# Patient Record
Sex: MALE | Race: WHITE | Employment: OTHER | URBAN - METROPOLITAN AREA
[De-identification: names, ages, dates, MRNs, and addresses within clinical notes are randomized per-mention and may not be internally consistent; named-entity substitution may affect disease eponyms.]

---

## 2021-01-07 ENCOUNTER — APPOINTMENT (OUTPATIENT)
Dept: GENERAL RADIOLOGY | Facility: CLINIC | Age: 51
DRG: 287 | End: 2021-01-07
Attending: EMERGENCY MEDICINE
Payer: COMMERCIAL

## 2021-01-07 ENCOUNTER — HOSPITAL ENCOUNTER (INPATIENT)
Facility: CLINIC | Age: 51
LOS: 1 days | Discharge: ANOTHER HOSPITAL | DRG: 287 | End: 2021-01-08
Attending: EMERGENCY MEDICINE | Admitting: INTERNAL MEDICINE
Payer: COMMERCIAL

## 2021-01-07 DIAGNOSIS — I20.0 UNSTABLE ANGINA (H): ICD-10-CM

## 2021-01-07 LAB
ALBUMIN SERPL-MCNC: 4.2 G/DL (ref 3.4–5)
ALP SERPL-CCNC: 69 U/L (ref 40–150)
ALT SERPL W P-5'-P-CCNC: 31 U/L (ref 0–70)
ANION GAP SERPL CALCULATED.3IONS-SCNC: 4 MMOL/L (ref 3–14)
APTT PPP: 35 SEC (ref 22–37)
AST SERPL W P-5'-P-CCNC: 17 U/L (ref 0–45)
BASOPHILS # BLD AUTO: 0 10E9/L (ref 0–0.2)
BASOPHILS NFR BLD AUTO: 0.6 %
BILIRUB SERPL-MCNC: 1 MG/DL (ref 0.2–1.3)
BUN SERPL-MCNC: 14 MG/DL (ref 7–30)
CALCIUM SERPL-MCNC: 9.3 MG/DL (ref 8.5–10.1)
CHLORIDE SERPL-SCNC: 107 MMOL/L (ref 94–109)
CO2 SERPL-SCNC: 27 MMOL/L (ref 20–32)
CREAT SERPL-MCNC: 0.77 MG/DL (ref 0.66–1.25)
DIFFERENTIAL METHOD BLD: NORMAL
EOSINOPHIL # BLD AUTO: 0.1 10E9/L (ref 0–0.7)
EOSINOPHIL NFR BLD AUTO: 2 %
ERYTHROCYTE [DISTWIDTH] IN BLOOD BY AUTOMATED COUNT: 12.2 % (ref 10–15)
GFR SERPL CREATININE-BSD FRML MDRD: >90 ML/MIN/{1.73_M2}
GLUCOSE SERPL-MCNC: 102 MG/DL (ref 70–99)
HBA1C MFR BLD: 5.5 % (ref 0–5.6)
HCT VFR BLD AUTO: 51.4 % (ref 40–53)
HGB BLD-MCNC: 17 G/DL (ref 13.3–17.7)
IMM GRANULOCYTES # BLD: 0 10E9/L (ref 0–0.4)
IMM GRANULOCYTES NFR BLD: 0.3 %
LABORATORY COMMENT REPORT: NORMAL
LYMPHOCYTES # BLD AUTO: 1.7 10E9/L (ref 0.8–5.3)
LYMPHOCYTES NFR BLD AUTO: 25.8 %
MCH RBC QN AUTO: 29.9 PG (ref 26.5–33)
MCHC RBC AUTO-ENTMCNC: 33.1 G/DL (ref 31.5–36.5)
MCV RBC AUTO: 91 FL (ref 78–100)
MONOCYTES # BLD AUTO: 0.6 10E9/L (ref 0–1.3)
MONOCYTES NFR BLD AUTO: 8.9 %
NEUTROPHILS # BLD AUTO: 4.1 10E9/L (ref 1.6–8.3)
NEUTROPHILS NFR BLD AUTO: 62.4 %
NRBC # BLD AUTO: 0 10*3/UL
NRBC BLD AUTO-RTO: 0 /100
PLATELET # BLD AUTO: 252 10E9/L (ref 150–450)
POTASSIUM SERPL-SCNC: 3.6 MMOL/L (ref 3.4–5.3)
PROT SERPL-MCNC: 7.8 G/DL (ref 6.8–8.8)
RBC # BLD AUTO: 5.68 10E12/L (ref 4.4–5.9)
SARS-COV-2 RNA RESP QL NAA+PROBE: NEGATIVE
SODIUM SERPL-SCNC: 138 MMOL/L (ref 133–144)
SPECIMEN SOURCE: NORMAL
TROPONIN I SERPL-MCNC: <0.015 UG/L (ref 0–0.04)
UFH PPP CHRO-ACNC: 0.28 IU/ML
WBC # BLD AUTO: 6.5 10E9/L (ref 4–11)

## 2021-01-07 PROCEDURE — 93005 ELECTROCARDIOGRAM TRACING: CPT

## 2021-01-07 PROCEDURE — 250N000013 HC RX MED GY IP 250 OP 250 PS 637: Performed by: PHYSICIAN ASSISTANT

## 2021-01-07 PROCEDURE — 84484 ASSAY OF TROPONIN QUANT: CPT | Performed by: EMERGENCY MEDICINE

## 2021-01-07 PROCEDURE — 99223 1ST HOSP IP/OBS HIGH 75: CPT | Mod: AI | Performed by: PHYSICIAN ASSISTANT

## 2021-01-07 PROCEDURE — 96375 TX/PRO/DX INJ NEW DRUG ADDON: CPT

## 2021-01-07 PROCEDURE — 71046 X-RAY EXAM CHEST 2 VIEWS: CPT

## 2021-01-07 PROCEDURE — 99285 EMERGENCY DEPT VISIT HI MDM: CPT | Mod: 25

## 2021-01-07 PROCEDURE — 83036 HEMOGLOBIN GLYCOSYLATED A1C: CPT | Performed by: PHYSICIAN ASSISTANT

## 2021-01-07 PROCEDURE — 250N000011 HC RX IP 250 OP 636: Performed by: EMERGENCY MEDICINE

## 2021-01-07 PROCEDURE — 120N000001 HC R&B MED SURG/OB

## 2021-01-07 PROCEDURE — 96365 THER/PROPH/DIAG IV INF INIT: CPT

## 2021-01-07 PROCEDURE — 36415 COLL VENOUS BLD VENIPUNCTURE: CPT | Performed by: PHYSICIAN ASSISTANT

## 2021-01-07 PROCEDURE — 85730 THROMBOPLASTIN TIME PARTIAL: CPT | Performed by: EMERGENCY MEDICINE

## 2021-01-07 PROCEDURE — 250N000013 HC RX MED GY IP 250 OP 250 PS 637: Performed by: EMERGENCY MEDICINE

## 2021-01-07 PROCEDURE — 87635 SARS-COV-2 COVID-19 AMP PRB: CPT | Performed by: EMERGENCY MEDICINE

## 2021-01-07 PROCEDURE — 258N000003 HC RX IP 258 OP 636: Performed by: PHYSICIAN ASSISTANT

## 2021-01-07 PROCEDURE — C9803 HOPD COVID-19 SPEC COLLECT: HCPCS

## 2021-01-07 PROCEDURE — 96376 TX/PRO/DX INJ SAME DRUG ADON: CPT

## 2021-01-07 PROCEDURE — 250N000013 HC RX MED GY IP 250 OP 250 PS 637: Performed by: INTERNAL MEDICINE

## 2021-01-07 PROCEDURE — 85025 COMPLETE CBC W/AUTO DIFF WBC: CPT | Performed by: EMERGENCY MEDICINE

## 2021-01-07 PROCEDURE — 36415 COLL VENOUS BLD VENIPUNCTURE: CPT | Performed by: EMERGENCY MEDICINE

## 2021-01-07 PROCEDURE — 85520 HEPARIN ASSAY: CPT | Performed by: PHYSICIAN ASSISTANT

## 2021-01-07 PROCEDURE — 84484 ASSAY OF TROPONIN QUANT: CPT | Performed by: PHYSICIAN ASSISTANT

## 2021-01-07 PROCEDURE — 80053 COMPREHEN METABOLIC PANEL: CPT | Performed by: EMERGENCY MEDICINE

## 2021-01-07 RX ORDER — LABETALOL HYDROCHLORIDE 5 MG/ML
20 INJECTION, SOLUTION INTRAVENOUS ONCE
Status: COMPLETED | OUTPATIENT
Start: 2021-01-07 | End: 2021-01-07

## 2021-01-07 RX ORDER — ASPIRIN 81 MG/1
81 TABLET, CHEWABLE ORAL DAILY
Status: DISCONTINUED | OUTPATIENT
Start: 2021-01-08 | End: 2021-01-08 | Stop reason: HOSPADM

## 2021-01-07 RX ORDER — ONDANSETRON 2 MG/ML
4 INJECTION INTRAMUSCULAR; INTRAVENOUS EVERY 6 HOURS PRN
Status: DISCONTINUED | OUTPATIENT
Start: 2021-01-07 | End: 2021-01-08 | Stop reason: HOSPADM

## 2021-01-07 RX ORDER — LIDOCAINE 40 MG/G
CREAM TOPICAL
Status: DISCONTINUED | OUTPATIENT
Start: 2021-01-07 | End: 2021-01-08 | Stop reason: HOSPADM

## 2021-01-07 RX ORDER — ROSUVASTATIN CALCIUM 20 MG/1
20 TABLET, COATED ORAL DAILY
Status: DISCONTINUED | OUTPATIENT
Start: 2021-01-07 | End: 2021-01-08 | Stop reason: HOSPADM

## 2021-01-07 RX ORDER — ASPIRIN 325 MG
325 TABLET ORAL ONCE
Status: COMPLETED | OUTPATIENT
Start: 2021-01-07 | End: 2021-01-07

## 2021-01-07 RX ORDER — LISINOPRIL 5 MG/1
5 TABLET ORAL DAILY
Status: DISCONTINUED | OUTPATIENT
Start: 2021-01-07 | End: 2021-01-07

## 2021-01-07 RX ORDER — AMOXICILLIN 250 MG
2 CAPSULE ORAL 2 TIMES DAILY
Status: DISCONTINUED | OUTPATIENT
Start: 2021-01-07 | End: 2021-01-08

## 2021-01-07 RX ORDER — AMOXICILLIN 250 MG
1 CAPSULE ORAL 2 TIMES DAILY
Status: DISCONTINUED | OUTPATIENT
Start: 2021-01-07 | End: 2021-01-08

## 2021-01-07 RX ORDER — METOPROLOL TARTRATE 25 MG/1
25 TABLET, FILM COATED ORAL 2 TIMES DAILY
Status: DISCONTINUED | OUTPATIENT
Start: 2021-01-07 | End: 2021-01-08 | Stop reason: HOSPADM

## 2021-01-07 RX ORDER — NALOXONE HYDROCHLORIDE 0.4 MG/ML
0.2 INJECTION, SOLUTION INTRAMUSCULAR; INTRAVENOUS; SUBCUTANEOUS
Status: DISCONTINUED | OUTPATIENT
Start: 2021-01-07 | End: 2021-01-08 | Stop reason: HOSPADM

## 2021-01-07 RX ORDER — ONDANSETRON 4 MG/1
4 TABLET, ORALLY DISINTEGRATING ORAL EVERY 6 HOURS PRN
Status: DISCONTINUED | OUTPATIENT
Start: 2021-01-07 | End: 2021-01-08 | Stop reason: HOSPADM

## 2021-01-07 RX ORDER — LISINOPRIL 10 MG/1
10 TABLET ORAL DAILY
Status: DISCONTINUED | OUTPATIENT
Start: 2021-01-07 | End: 2021-01-08

## 2021-01-07 RX ORDER — CIPROFLOXACIN AND DEXAMETHASONE 3; 1 MG/ML; MG/ML
4 SUSPENSION/ DROPS AURICULAR (OTIC) 2 TIMES DAILY PRN
COMMUNITY
End: 2022-11-07

## 2021-01-07 RX ORDER — MORPHINE SULFATE 2 MG/ML
2-4 INJECTION, SOLUTION INTRAMUSCULAR; INTRAVENOUS
Status: DISCONTINUED | OUTPATIENT
Start: 2021-01-07 | End: 2021-01-08 | Stop reason: HOSPADM

## 2021-01-07 RX ORDER — ACETAMINOPHEN 325 MG/1
650 TABLET ORAL EVERY 4 HOURS PRN
Status: DISCONTINUED | OUTPATIENT
Start: 2021-01-07 | End: 2021-01-08 | Stop reason: HOSPADM

## 2021-01-07 RX ORDER — IBUPROFEN 200 MG
200-600 TABLET ORAL EVERY 6 HOURS PRN
Status: ON HOLD | COMMUNITY
End: 2021-01-14

## 2021-01-07 RX ORDER — NALOXONE HYDROCHLORIDE 0.4 MG/ML
0.4 INJECTION, SOLUTION INTRAMUSCULAR; INTRAVENOUS; SUBCUTANEOUS
Status: DISCONTINUED | OUTPATIENT
Start: 2021-01-07 | End: 2021-01-08 | Stop reason: HOSPADM

## 2021-01-07 RX ORDER — DOXYCYCLINE HYCLATE 100 MG
100 TABLET ORAL 2 TIMES DAILY PRN
COMMUNITY

## 2021-01-07 RX ORDER — HEPARIN SODIUM 10000 [USP'U]/100ML
0-5000 INJECTION, SOLUTION INTRAVENOUS CONTINUOUS
Status: DISCONTINUED | OUTPATIENT
Start: 2021-01-07 | End: 2021-01-08 | Stop reason: HOSPADM

## 2021-01-07 RX ORDER — SODIUM CHLORIDE 9 MG/ML
INJECTION, SOLUTION INTRAVENOUS CONTINUOUS
Status: DISCONTINUED | OUTPATIENT
Start: 2021-01-07 | End: 2021-01-08

## 2021-01-07 RX ADMIN — METOPROLOL TARTRATE 25 MG: 25 TABLET, FILM COATED ORAL at 22:15

## 2021-01-07 RX ADMIN — LABETALOL HYDROCHLORIDE 20 MG: 5 INJECTION, SOLUTION INTRAVENOUS at 14:14

## 2021-01-07 RX ADMIN — HEPARIN SODIUM 1200 UNITS/HR: 10000 INJECTION, SOLUTION INTRAVENOUS at 14:17

## 2021-01-07 RX ADMIN — SODIUM CHLORIDE: 9 INJECTION, SOLUTION INTRAVENOUS at 19:53

## 2021-01-07 RX ADMIN — ROSUVASTATIN 20 MG: 20 TABLET, FILM COATED ORAL at 17:47

## 2021-01-07 RX ADMIN — ASPIRIN 325 MG ORAL TABLET 325 MG: 325 PILL ORAL at 12:19

## 2021-01-07 RX ADMIN — LISINOPRIL 10 MG: 10 TABLET ORAL at 17:47

## 2021-01-07 RX ADMIN — METOPROLOL TARTRATE 25 MG: 25 TABLET, FILM COATED ORAL at 17:47

## 2021-01-07 ASSESSMENT — ENCOUNTER SYMPTOMS
FATIGUE: 0
DIAPHORESIS: 0
DIZZINESS: 0
SHORTNESS OF BREATH: 1
NAUSEA: 0
CHEST TIGHTNESS: 1

## 2021-01-07 ASSESSMENT — MIFFLIN-ST. JEOR
SCORE: 2087
SCORE: 2067.35
SCORE: 2079.6

## 2021-01-07 ASSESSMENT — ACTIVITIES OF DAILY LIVING (ADL)
ADLS_ACUITY_SCORE: 19
ADLS_ACUITY_SCORE: 14

## 2021-01-07 NOTE — ED NOTES
[facility]  ED Nurse Handoff Report    Mor Monson is a 50 year old male   ED Chief complaint: Chest Pain  . ED Diagnosis:   Final diagnoses:   Unstable angina (H)     Allergies: Not on File    Code Status: Full Code  Activity level - Baseline/Home:  Independent. Activity Level - Current:   Independent. Lift room needed: No. Bariatric: No   Needed: No   Isolation: No. Infection: Not Applicable.     Vital Signs:   Vitals:    01/07/21 1230 01/07/21 1245 01/07/21 1300 01/07/21 1339   BP: (!) 156/104 (!) 153/108 (!) 173/131    Pulse: 82  85    Resp:       Temp:       TempSrc:       SpO2: 94%  97%    Weight:    119.9 kg (264 lb 5.3 oz)   Height:           Cardiac Rhythm:  ,      Pain level:    Patient confused: No. Patient Falls Risk: No.   Elimination Status: Has voided   Patient Report - Initial Complaint: Patient presents with left-sided chest pain upon exertion intermittently since November. SOB with chest pain. Patient states it is getting worse so he came inPain is very intermittent in nature. States chest pain 1/4 mile into a 2 mile walk that fully resolved with a brief period of rest and did not recur during the remainder of the walk. States did have a Stress Test in 2019 which he believes was normal as he never heard anything back about it. Generally active with exercise daily. Focused Assessment: Monitor shows SR with HR 70-80.  BP was elevated 200/XXX.  Labetalol given  Tests Performed:   Labs Ordered and Resulted from Time of ED Arrival Up to the Time of Departure from the ED   COMPREHENSIVE METABOLIC PANEL - Abnormal; Notable for the following components:       Result Value    Glucose 102 (*)     All other components within normal limits   CBC WITH PLATELETS DIFFERENTIAL   TROPONIN I   PARTIAL THROMBOPLASTIN TIME   SARS-COV-2 (COVID-19) VIRUS RT-PCR   PERIPHERAL IV CATHETER   MEASURE WEIGHT   NOTIFY PHYSICIAN   NOTIFY PHYSICIAN   . Abnormal Results:  XR Chest 2 Views    Preliminary Result   IMPRESSION: No acute cardiopulmonary disease.        Treatments provided: labetolol, heparin bolus and infusion  Family Comments: Patient spoke with spouse  OBS brochure/video discussed/provided to patient:  N/A  ED Medications:   Medications   heparin 25,000 units in 0.45% NaCl 250 mL ANTICOAGULANT  infusion (1,200 Units/hr Intravenous New Bag 1/7/21 1417)   aspirin (ASA) tablet 325 mg (325 mg Oral Given 1/7/21 1219)   heparin ANTICOAGULANT loading dose for  LOW INTENSITY TREATMENT* Give BEFORE starting heparin infusion (6,000 Units Intravenous Given 1/7/21 1417)   labetalol (NORMODYNE/TRANDATE) injection 20 mg (20 mg Intravenous Given 1/7/21 1414)     Drips infusing:  Yes  Heparin gtt at 1200u/hour  For the majority of the shift, the patient's behavior Green. Interventions performed were none.    Sepsis treatment initiated: No     Patient tested for COVID 19 prior to admission: YES    ED Nurse Name/Phone Number: Angela Penn RN,   2:26 PM    RECEIVING UNIT ED HANDOFF REVIEW    Above ED Nurse Handoff Report was reviewed: Yes  Reviewed by: Rafa Cantu RN on January 7, 2021 at 3:28 PM

## 2021-01-07 NOTE — CONSULTS
Cardiology Consult Note-- PLEASE SEE ASSOCIATED DICTATION    Mor Monson MRN#: [id number]   YOB: 1970 Age: 50 year old     Date of Admission: 1/7/2021  Consult indication: chest pain         HPI and Assessment and Recommendations/Plan:      Please refer to the associated dictation: #[id number]     - overall clinical presentation is concerning for unstable angina, however clinical history if not classically consistent, symptoms could be from HTN  - Discussed options for further evaluation and management including conservative medical management with close follow up, non-invasive stress testing, or cardiac catheterization/coronary angiography +/- PCI.  Reviewed the risks and benefits of each option at length.  Non-invasive stress testing with close follow up or coronary angiography would be reasonable.   - Pt would like to hold off on making a decision at this time, will consider his options after further discussion with his family   - start lisinopril 10mg daily  - agree with metoprolol tartrate 25mg BID  - agree with rosuvastatin 20mg at bedtime  - agree with ASA  - agree with heparin GTT  - TTE in AM  - NPO at midnight for stress test vs coronary angiogram in AM  - fasting lipids in AM      Thank you for allowing our team to participate in the care of Mor Monson.  Please do not hesitate to page me with any questions or concerns.     Jena Méndez MD, [facility]  Cardiology  Text Page   January 7, 2021         Past Medical History:   I have reviewed this patient's past medical history  The ASCVD Risk score (Nikobriana GARCIA Jr., et al., 2013) failed to calculate for the following reasons:    Cannot find a previous HDL lab    Cannot find a previous total cholesterol lab    The smoking status is invalid  No past medical history on file.            Past Surgical History:   I have reviewed this patient's past surgical history   No past surgical history on file.          Social History:   I have  reviewed this patient's social history  Social History     Tobacco Use     Smoking status: Not on file   Substance Use Topics     Alcohol use: Not on file             Family History:   I have reviewed this patient's family history  No family history on file.          Allergies:   I have reviewed this patient's allergy history  No Known Allergies          Medications reviewed:   Prior to admission medications:  Prior to Admission medications    Medication Sig Start Date End Date Taking? Authorizing Provider   ciprofloxacin-dexamethasone (CIPRODEX) 0.3-0.1 % otic suspension Place 4 drops into both ears 2 times daily as needed    Unknown, Entered By History   doxycycline hyclate (VIBRA-TABS) 100 MG tablet Take 100 mg by mouth 2 times daily as needed (eye infection)    Unknown, Entered By History   ibuprofen (ADVIL/MOTRIN) 200 MG tablet Take 200-600 mg by mouth every 6 hours as needed for mild pain    Unknown, Entered By History      Current medications:  Current Facility-Administered Medications Ordered in Epic   Medication Dose Route Frequency Last Rate Last Admin     acetaminophen (TYLENOL) tablet 650 mg  650 mg Oral Q4H PRN         [START ON 1/8/2021] aspirin (ASA) chewable tablet 81 mg  81 mg Oral Daily         heparin 25,000 units in 0.45% NaCl 250 mL ANTICOAGULANT  infusion  0-5,000 Units/hr Intravenous Continuous 12 mL/hr at 01/07/21 1614 1,200 Units/hr at 01/07/21 1614     HOLD: nitroGLYcerin IF   Does not apply HOLD         lidocaine (LMX4) cream   Topical Q1H PRN         lidocaine 1 % 0.1-1 mL  0.1-1 mL Other Q1H PRN         lisinopril (ZESTRIL) tablet 10 mg  10 mg Oral Daily         medication instruction   Does not apply Continuous PRN         melatonin tablet 1 mg  1 mg Oral At Bedtime PRN         metoprolol tartrate (LOPRESSOR) tablet 25 mg  25 mg Oral BID         morphine (PF) injection 2-4 mg  2-4 mg Intravenous Q2H PRN         naloxone (NARCAN) injection 0.2 mg  0.2 mg Intravenous Q2 Min PRN        Or      naloxone (NARCAN) injection 0.4 mg  0.4 mg Intravenous Q2 Min PRN        Or     naloxone (NARCAN) injection 0.2 mg  0.2 mg Intramuscular Q2 Min PRN        Or     naloxone (NARCAN) injection 0.4 mg  0.4 mg Intramuscular Q2 Min PRN         ondansetron (ZOFRAN-ODT) ODT tab 4 mg  4 mg Oral Q6H PRN        Or     ondansetron (ZOFRAN) injection 4 mg  4 mg Intravenous Q6H PRN         rosuvastatin (CRESTOR) tablet 20 mg  20 mg Oral Daily         senna-docusate (SENOKOT-S/PERICOLACE) 8.6-50 MG per tablet 1 tablet  1 tablet Oral BID        Or     senna-docusate (SENOKOT-S/PERICOLACE) 8.6-50 MG per tablet 2 tablet  2 tablet Oral BID         sodium chloride (PF) 0.9% PF flush 3 mL  3 mL Intracatheter Q8H         sodium chloride (PF) 0.9% PF flush 3 mL  3 mL Intracatheter q1 min prn         sodium chloride 0.9% infusion   Intravenous Continuous         No current [facility]-ordered outpatient medications on file.             Review of Systems:   A complete review of systems was performed and was negative except as mentioned in the HPI.          Physical Exam:   Vital signs were personally reviewed:  Temperatures:  Current - Temp: 97.3  F (36.3  C); Max - Temp  Av.2  F (36.2  C)  Min: 97.1  F (36.2  C)  Max: 97.3  F (36.3  C)  Respiration range: Resp  Avg: 15.8  Min: 13  Max: 18  Pulse range: Pulse  Av.6  Min: 70  Max: 100  Blood pressure range: Systolic (24hrs), Av , Min:147 , Max:219   ; Diastolic (24hrs), Av, Min:84, Max:139    Pulse oximetry range: SpO2  Av.4 %  Min: 94 %  Max: 99 %  No intake or output data in the 24 hours ending 21 1728  262 lbs 11.2 oz  Body mass index is 35.63 kg/m .   Body surface area is 2.46 meters squared.    Constitutional: appears stated age, in no apparent distress, appears to be well nourished  Eyes: sclera anicteric, conjunctiva normal  ENT: normocephalic, without obvious abnormality, atraumatic  Pulmonary: clear to auscultation bilaterally, no wheezes, no rales, no  increased work of breathing  Cardiovascular: JVP normal, regular rate, regular rhythm, normal S1 and S2, no S3, S4, no murmur appreciated, no lower extremity edema  Gastrointestinal: abdominal exam benign  Neurologic: awake, alert, face symmetrical, moves all extremities  Skin: no jaundice  Psychiatric: affect is normal, answers questions appropriately, oriented to self and place         Laboratory tests:   Laboratory tests personally reviewed:   CMP  Recent Labs   Lab 01/07/21  1158      POTASSIUM 3.6   CHLORIDE 107   CO2 27   ANIONGAP 4   *   BUN 14   CR 0.77   GFRESTIMATED >90   GFRESTBLACK >90   JOSE ALBERTO 9.3   PROTTOTAL 7.8   ALBUMIN 4.2   BILITOTAL 1.0   ALKPHOS 69   AST 17   ALT 31     CBC  Recent Labs   Lab 01/07/21  1158   WBC 6.5   RBC 5.68   HGB 17.0   HCT 51.4   MCV 91   MCH 29.9   MCHC 33.1   RDW 12.2        INRNo lab results found in last 7 days.  Lab Results   Component Value Date    TROPI <0.015 01/07/2021    TROPI <0.015 01/07/2021     No results for input(s): CHOL, HDL, LDL, TRIG, CHOLHDLRATIO in the last 24292 hours.  Lab Results   Component Value Date    A1C 5.5 01/07/2021

## 2021-01-07 NOTE — CONSULTS
Consult Date:  01/07/2021      CARDIOLOGY CONSULTATION      CONSULT INDICATION:  Chest pain.      HISTORY OF PRESENT ILLNESS:      I had the opportunity to see patient, Mor Monson today at [facility] for a Cardiology consultation.  As you know, he is a 50-year-old male with a past medical history significant for hypertension who presents for further evaluation and management of chest discomfort.      The patient is quite active at baseline, he works as a  and so is physically active on a regular basis. In November, he was walking about 2 miles every day when he noted left-sided chest discomfort with shortness of breath at the onset of exercise.  His symptoms resolved spontaneously, even with continued exercise.  Subsequently, he has had chest discomfort with increasing frequency, as well as severity.  Over the past several weeks, he has had similar chest tightness while at work, lifting boxes of tile for example.  Interestingly, these symptoms seem to occur sporadically, as there are times when he is able to exert himself physically without any abnormal symptoms.  He denies any symptoms of orthopnea/PND/lower extremity swelling.  He denies any symptoms of presyncope or syncope.      He was seen by his primary care physician for a virtual visit today and due to the concerning nature of his symptoms, he was directed to the Emergency Department for further evaluation and management.      In the Emergency Department, his initial blood pressure was 219/121 mmHg.  The patient states that his blood pressures are typically in the 120s-140s mmHg range systolic, though he does not check it frequently.  Labs are notable for a potassium of 3.6, creatinine 0.77, troponin negative x 2, CBC normal.  COVID-19 negative.  Chest x-ray 01/07/2021 was normal.  An ECG was done, which showed normal sinus rhythm, borderline LVH, normal axis, normal intervals, no acute ischemic changes, QTc 435  milliseconds.      He was admitted to the Internal Medicine Service for further evaluation and management.  He has been initiated on therapy for unstable angina with aspirin, beta blocker therapy, lisinopril, statin, and heparin GTT.  He has not had recurrence of his symptoms since admission.      He does not smoke cigarettes.  He drinks alcohol on the weekends, 1-2 glasses of tequila about a 3-finger pour.  He does not use illicit drugs.  He is  and has 2 daughters.      He denies any family history of early ASCVD.      Last ischemic evaluation was an exercise stress echocardiogram through the [facility] system that was negative for evidence of inducible ischemia.      ASSESSMENT, PLAN AND RECOMMENDATIONS:     1.  Exertional chest discomfort.  Overall, clinical presentation is concerning for unstable angina; however, there are elements in his clinical history that are not classically consistent with angina.  ECG reassuring, troponin negative x2.  His symptoms may very well be from hypertension/hypertensive response to exercise.  2.  Hypertensive urgency, initial blood pressure 219/121 mmHg.   3.  Overweight (weight 262 pounds).      -- Discussed that it would be reasonable to pursue noninvasive stress testing with close followup or coronary angiography plus or minus PCI.  Reviewed the risks and benefits of each option at length.    -- Patient would like to hold off on making a decision at this time, will consider options after further discussion with his family.   -- Continue current cardiac regimen of aspirin, heparin GTT, metoprolol tartrate 25 mg b.i.d., rosuvastatin 20 mg at bedtime.   -- Will start lisinopril 10 mg daily.   -- TTE in a.m.   -- Fasting lipids in a.m.   -- Continue cardiac telemetry.  Maintain potassium greater than 4 and magnesium greater than 2.   -- N.p.o. at midnight pending a stress test versus coronary angiogram in a.m.  -- Cardiology will follow.      Thank you for allowing our  team to participate in the care of patient, Mor Kerns.  Please do not hesitate to page or call with any questions or concerns.      Jean Méndez MD, [facility]  Cardiology  Text Page   2021        D: 2021   T: 2021   MT: MAGEN      Name:     MOR KERNS   MRN:      [id number]        Account:       [id number]   :      1970           Consult Date:  2021      Document: [id number]

## 2021-01-07 NOTE — ED PROVIDER NOTES
History   Chief Complaint:  Chest Tightness       The history is provided by the patient.      Mor Monson is a 50 year old male with history of hypertension who presents with intermittent chest tightness that is prompted by exertion. The patient reports that he regularly goes for a mile walk but since November 2020 has noticed that about 10 minutes/0.25 miles into his walk he has become prematurely short of breath accompanied by chest tightness. He notes that if he stands at rest for a couple of minutes his symptoms resolve completely and he is able to finish the rest of his walk without any recurrence in symptoms. 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 Patient is a  and notes that this is very unusual for him as he typically has no such issues with physical exertion or manual labor. He states that it has been taking less and less exertion to trigger his symptoms and also notes having symptoms during sexual intercourse. He stopped taking his walks around [place] as he became too short of breath. He does note that his symptoms have not lasted longer than 5 minutes and has always resolved with rest. He also emphasizes that he does not feel fatigued along with his shortness of breath. Patient presents today as he met with his PCP via virtual visit who recommended he come in for a stress test. He had a prior stress test completed the August 2018 when he has some chest tightness while playing volleyball, but reports that it was negative. He otherwise feels fine. Patient does have history of hypertension and is not currently on medications but mentions that he takes his BP regularly and his baseline is 140/90. The patient denies any nausea, diaphoresis, or dizziness along with his chest tightness. He further denies any leg swelling. Patient is a non-smoker and has no known family or personal history  of heart trouble or hyperlipidemia. He is not currently taking bloodthinners or aspirin.    STRESS ECHOCARDIOGRAM - valuescope  Date: 08/30/2018 Start: 08:09 AM Facility: [facility]    CONCLUSIONS  REST:  LV chamber size, wall thickness, and segmental and global wall motion  are normal. No significant valvular abnormalities are seen.  The visually estimated resting LVEF is 65 %.    STRESS:  All segments display appropriate hyperkinesis; ejection fraction  increases appropriately.  End systolic area decreases    CONCLUSIONS:  Normal exercise echocardiogram with adequate heart rate and workload.  No evidence for inducible ischemia.  Risk stratification by stress echocardiography is identified as low  risk based on normal stress echocardiogram.      Review of Systems   Constitutional: Negative for diaphoresis and fatigue.   Respiratory: Positive for chest tightness and shortness of breath (with exertion).    Cardiovascular: Negative for leg swelling.   Gastrointestinal: Negative for nausea.   Neurological: Negative for dizziness.   All other systems reviewed and are negative.      Allergies:  Oxycodone-acetaminophen    Medications:  The patient denies taking any current regular medications.    Past Medical History:    Hypertension  Basal cell carcinoma  Chronic mucoid otitis media  Depression  Generalized anxiety    Past Surgical History:    Tonsillectomy  Right tympanostomy tube placement    Family History:    Father - diabetes    Social History:  The patient was not accompanied to the ED.  Smoking Status: Never  Occupation:     Physical Exam     Patient Vitals for the past 24 hrs:   BP Temp Temp src Pulse Resp SpO2 Height Weight   01/07/21 1339 -- -- -- -- -- -- -- 119.9 kg (264 lb 5.3 oz)   01/07/21 1300 (!) 173/131 -- -- 85 -- 97 % -- --   01/07/21 1245 (!) 153/108 -- -- -- -- -- -- --   01/07/21 1230 (!) 156/104 -- -- 82 -- 94 % -- --   01/07/21 1215 (!) 164/119 -- -- 87 -- 96 % -- --    01/07/21 1200 (!) 182/123 -- -- 100 -- 99 % -- --   01/07/21 1135 (!) 219/121 97.1  F (36.2  C) Temporal 92 18 98 % 1.829 m (6') 117.9 kg (260 lb)       Physical Exam  General: Well appearing, nontoxic. Resting comfortably  Head:  Scalp, face, and head appear normal  Eyes:  Pupils are equal, round    Conjunctivae non-injected and sclerae white  ENT:    The external nose is normal    Pinnae are normal  Neck:  Normal range of motion    There is no rigidity noted    Trachea is in the midline  CV:  Regular rate and rhythm     Normal S1/S2, no S3/S4    No murmur or rub. Radial pulses 2+ bilaterally.  Resp:  Lungs are clear and equal bilaterally  There is no tachypnea    No increased work of breathing    No rales, wheezing, or rhonchi  GI:  Abdomen is soft, no rigidity or guarding    No distension, or mass    No tenderness or rebound tenderness   MS:  Normal muscular tone    Symmetric motor strength    No lower extremity edema. No calf swelling or tenderness.  Skin:  No rash or acute skin lesions noted  Neuro: Awake and alert  Speech is normal and fluent  Moves all extremities spontaneously  Psych:  Normal affect. Appropriate interactions.    Emergency Department Course     ECG:  Indication: Chest tightness  Completed at 1130.  Read at 1200.   Normal sinus rhythm   Rate 87 bpm. WY interval 154. QRS duration 94. QT/QTc 362/435. P-R-T axes 42 55 43.  Agree with computer interpretation.     Imaging:  XR Chest 2 Views:  No acute cardiopulmonary disease.  Report per radiology.    Laboratory:  CBC: WBC 6.5, HGB 17.0,   CMP: Glucose 102 (H), o/w WNL (Creatinine 0.77)    PTT: 35    Heparin Unfractionated Anti Xa Level: Pending    (1158) Troponin I ES: <0.015     Asymptomatic COVID-19 Virus (Coronavirus) by Nasopharyngeal (NP) Swab: Pending    Procedures  None.    Emergency Department Course:    Reviewed:  I reviewed nursing notes, vitals, past medical history and care everywhere.    Assessments:  1217 I initially  evaluated the patient in ED room 18.   1315 I reassessed the patient and updated him on my conversation with Cardiology and plan for admission.    Consults:   1302 I consulted with Dr. Méndez of the Cardiology service regarding patient. He recommends that the patient be admitted.  134 I consulted with Adeline Delgado PA-C for Dr. Mesa of the Hospitalist service regarding patient, who agrees to admit patient to hospital in monitored medical bed.     Interventions:  1219 Aspirin 325 mg PO  1414 Labetalol 20 mg IV  1417 Heparin 6,000 units IV  1417 Heparin 25,000 units IV Bolus    Disposition:  The patient was admitted to the hospital under the care of Dr. Mesa.     Impression & Plan     Covid-19  Mor Monson was evaluated during a global COVID-19 pandemic, which necessitated consideration that the patient might be at risk for infection with the SARS-CoV-2 virus that causes COVID-19.   Applicable protocols for evaluation were followed during the patient's care.   COVID-19 was considered as part of the patient's evaluation. A test was obtained during this visit prior to the patient's admission.    Medical Decision Making:  Mor Monson is a 50 year old male without any past known cardiac history who presents with accelerating exertional chest pain and shortness of breath consistent with angina.  On my evaluation he is well-appearing, hemodynamically stable and afebrile.  He is currently chest pain-free.  Initial EKG and troponin are normal.  However his symptoms are concerning for cardiac related chest pain with likely underlying coronary artery disease.  He does have risk factors including obesity and hypertension and is significantly hypertensive on arrival to the emergency department.  Chest x-ray and the remainder of his initial ED work-up was otherwise unremarkable.  Given his concerning symptoms I discussed the case with Dr. Méndez of cardiology who recommended admission to the hospital for further cardiac  testing and treatment of unstable angina.  The patient was started on heparin infusion.  He was given 1 dose of labetalol IV with improvement of his blood pressure and the case was discussed with the hospitalist to excepted the patient for admission.  The patient was agreeable to plan of care.  The patient was admitted in stable condition.    Diagnosis:    ICD-10-CM    1. Unstable angina (H)  I20.0 Partial thromboplastin time     Asymptomatic SARS-CoV-2 COVID-19 Virus (Coronavirus) by PCR       Scribe Disclosure:  Aundrea DIAZ, am serving as a scribe at 12:00 PM on 1/7/2021 to document services personally performed by Boone Morris MD based on my observations and the provider's statements to me.     Aundrea Her  1/7/2021   [facility] EMERGENCY DEPARTMENT           Boone Morris MD  01/08/21 1112

## 2021-01-07 NOTE — ED TRIAGE NOTES
Patient presents with left-sided chest pain upon exertion intermittently since November. SOB with chest pain. Patient states it is getting worse so he came in.

## 2021-01-07 NOTE — ED NOTES
Pain is very intermittent in nature.  chest pain 1/4 mile into a 2 mile walk that fully resolved with a brief period of rest and did not recur during the remainder of the walk.  did have a Stress Test in 2019 which he believes was normal as he never heard anything back about it. Generally active with exercise daily

## 2021-01-07 NOTE — PHARMACY-ADMISSION MEDICATION HISTORY
Admission medication history interview status for this patient is complete. See [facility] admission navigator for allergy information, prior to admission medications and immunization status.     Medication history interview done via telephone during Covid-19 pandemic, indicate source(s): Patient's wife Jessica  Medication history resources (including written lists, pill bottles, clinic record):None  Pharmacy: [facility]    Changes made to PTA medication list:  Added: all meds  Deleted: none  Changed: none    Actions taken by pharmacist (provider contacted, etc):None     Additional medication history information:    Patient does have atorvastatin 20 mg daily and amlodipine 5 mg daily prescriptions at home, but per wife he has not taken these in a very long time. Per wife, pt reports sexual side effects associated with one/both medications as the reason for non-compliance.     Medication reconciliation/reorder completed by provider prior to medication history?  N   (Y/N)     Prior to Admission medications    Medication Sig Last Dose Taking? Auth Provider   ciprofloxacin-dexamethasone (CIPRODEX) 0.3-0.1 % otic suspension Place 4 drops into both ears 2 times daily as needed More than a month at Unknown time  Unknown, Entered By History   doxycycline hyclate (VIBRA-TABS) 100 MG tablet Take 100 mg by mouth 2 times daily as needed (eye infection) More than a month at Unknown time  Unknown, Entered By History   ibuprofen (ADVIL/MOTRIN) 200 MG tablet Take 200-600 mg by mouth every 6 hours as needed for mild pain More than a month at Unknown time  Unknown, Entered By History

## 2021-01-07 NOTE — H&P
History and Physical     Mor Monson MRN# [id number]   YOB: 1970 Age: 50 year old      Date of Admission:  1/7/2021    Primary care provider: Asad Goodwin          Assessment and Plan:   Mor Monson is a 50 year old male with a PMH significant for HTN (not on BP meds, due to noncompliance), who presents with 6-8 weeks of progressive RAY, dec exercise tolerance and chest tightness with exertion. It has been escalating since XMAS and therefore stopped his daily walks. He was seen by PCP virtually to get outpt Stress testing but given the escalating nature, concerning for unstable angina, he was directed to come to the ED for further evaluation.     Work up in the ED reveals negative troponin and non ischemic EKG.   Cardiology was contacted due to classic history for unstable angina, patient was started on heparin drip and recommended for admission overnight with cardiology evaluation and likely coronary angiogram tomorrow.    1.  Progressive dyspnea on exertion, chest discomfort concerning for unstable angina  --Patient has fairly classic story for unstable angina  --Initial troponin negative, will check 1 more troponin  --Has no family or personal history of coronary disease.,  Has had past stress echo that was negative in 2018  --Cardiac risk factors includes hypertension, (unknown lipid and diabetes profile) but no family history  --Request formal cardiology consult  --Continue heparin drip, baby aspirin  --Check lipid and diabetes profile  --Start blood pressure medications for hypertension (see below)  --N.p.o. after midnight for likely coronary angiogram    2.  Hypertension-blood pressure elevated  --We will start metoprolol 25 twice daily with parameters, as well as very low-dose lisinopril 5 mg  --Titrate medication as BP allows    3.  History of depression anxiety--patient states that he is not on any medication    COVID-19 asymptomatic -- NEGATIVE    Admit to inpatient status    CODE: FULL  Dispo: Home in 2-3 days pending progress\  Updated wife, Jessica         Chief Complaint:   Chest pain        History of Present Illness:   Mor Monson is a 50 year old male with a PMH significant for HTN (not on BP meds, due to noncompliance), who presents with 6-8 weeks of progressive RAY, dec exercise tolerance and chest tightness with exertion.  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  He does walk every day but around Erika time he started getting substernal chest discomfort and shortness of breath that required him to stop and rest for about 2 minutes.  He is able to push through and walk for additional 40 minutes however since Erika his chest discomfort has escalated so he stopped walking.  Yesterday he was carrying a box of tiles and walking up 3 flights of stairs and he became quite winded and had substernal chest tightness that he had to stop.  It did improve after he rested for about 5 minutes.  He noticed that these chest discomfort and shortness of breath with exertion has become more frequent compared to previous and therefore he schedule a virtual visit with his PCP today.  After discussion with his PCP it was recommended that he come to the emergency room for further evaluation.    Work up in the ED reveals negative troponin and non ischemic EKG.   Cardiology was contacted due to classic history for unstable angina, patient was started on heparin drip and recommended for admission overnight with cardiology evaluation and likely coronary angiogram tomorrow.         Past Medical History:   HTN--not taking meds due to concerns of sexual SE  HLP denies but wife states he was prescribed Statin in the past          Past Surgical History:     Reviewed and non contributory.           Social History:     Social History     Socioeconomic History     Marital status:      Spouse name: Not on file     Number of children: Not on file     Years of  education: Not on file     Highest education level: Not on file   Occupational History     Not on file   Social Needs     Financial resource strain: Not on file     Food insecurity     Worry: Not on file     Inability: Not on file     Transportation needs     Medical: Not on file     Non-medical: Not on file   Tobacco Use     Smoking status: Not on file   Substance and Sexual Activity     Alcohol use: Not on file     Drug use: Not on file     Sexual activity: Not on file   Lifestyle     Physical activity     Days per week: Not on file     Minutes per session: Not on file     Stress: Not on file   Relationships     Social connections     Talks on phone: Not on file     Gets together: Not on file     Attends [employer] service: Not on file     Active member of club or organization: Not on file     Attends meetings of clubs or organizations: Not on file     Relationship status: Not on file     Intimate partner violence     Fear of current or ex partner: Not on file     Emotionally abused: Not on file     Physically abused: Not on file     Forced sexual activity: Not on file   Other Topics Concern     Not on file   Social History Narrative     Not on file               Family History:     No family hx of CAD.   Father with DM   Mother none          Allergies:    Not on File            Medications:     Prior to Admission medications    Medication Sig Last Dose Taking? Auth Provider   ciprofloxacin-dexamethasone (CIPRODEX) 0.3-0.1 % otic suspension Place 4 drops into both ears 2 times daily as needed More than a month at Unknown time  Unknown, Entered By History   doxycycline hyclate (VIBRA-TABS) 100 MG tablet Take 100 mg by mouth 2 times daily as needed (eye infection) More than a month at Unknown time  Unknown, Entered By History   ibuprofen (ADVIL/MOTRIN) 200 MG tablet Take 200-600 mg by mouth every 6 hours as needed for mild pain More than a month at Unknown time  Unknown, Entered By History              Review of  Systems:     A Comprehensive greater than 10 system review of systems was carried out.  Pertinent positives and negatives are noted above.  Otherwise negative for contributory information.            Physical Exam:   Blood pressure (!) 148/101, pulse 78, temperature 97.1  F (36.2  C), temperature source Temporal, resp. rate 13, height 1.829 m (6'), weight 119.9 kg (264 lb 5.3 oz), SpO2 95 %.  Exam:  GENERAL:  Comfortable. OBese  PSYCH: pleasant, oriented, No acute distress.  HEENT:  PERRLA. Normal conjunctiva, normal hearing, nasal mucosa and Oropharynx are normal.  NECK:  Supple, no neck vein distention, adenopathy or bruits, normal thyroid.  HEART:  Normal S1, S2 with no murmur, no pericardial rub, gallops or S3 or S4.  LUNGS:  Clear to auscultation, normal Respiratory effort. No wheezing, rales or ronchi.  ABDOMEN:  Soft, no hepatosplenomegaly, normal bowel sounds. Non-tender, non distended.   EXTREMITIES:  No pedal edema, +2 pulses bilateral and equal.  SKIN:  Dry to touch, No rash, wound or ulcerations.  NEUROLOGIC:  CN 2-12 intact, BL 5/5 symmetric upper and lower extremity strength, sensation is intact with no focal deficits.           Data:     Recent Labs   Lab 01/07/21  1158   WBC 6.5   HGB 17.0   HCT 51.4   MCV 91        Recent Labs   Lab 01/07/21  1158      POTASSIUM 3.6   CHLORIDE 107   CO2 27   ANIONGAP 4   *   BUN 14   CR 0.77   GFRESTIMATED >90   GFRESTBLACK >90   JOSE ALBERTO 9.3     No results for input(s): TSH in the last 168 hours.  Recent Labs   Lab 01/07/21  1158   TROPI <0.015         Results for orders placed or performed during the hospital encounter of 01/07/21   XR Chest 2 Views    Narrative    CHEST TWO VIEWS   1/7/2021 12:39 PM     HISTORY: Exertional chest pain.    COMPARISON: None.      Impression    IMPRESSION: No acute cardiopulmonary disease.         Adeline Delgado PA-C

## 2021-01-08 ENCOUNTER — ANESTHESIA EVENT (OUTPATIENT)
Dept: SURGERY | Facility: CLINIC | Age: 51
DRG: 235 | End: 2021-01-08
Payer: COMMERCIAL

## 2021-01-08 ENCOUNTER — APPOINTMENT (OUTPATIENT)
Dept: ULTRASOUND IMAGING | Facility: CLINIC | Age: 51
DRG: 235 | End: 2021-01-08
Attending: PHYSICIAN ASSISTANT
Payer: COMMERCIAL

## 2021-01-08 ENCOUNTER — APPOINTMENT (OUTPATIENT)
Dept: GENERAL RADIOLOGY | Facility: CLINIC | Age: 51
DRG: 235 | End: 2021-01-08
Attending: PHYSICIAN ASSISTANT
Payer: COMMERCIAL

## 2021-01-08 ENCOUNTER — HOSPITAL ENCOUNTER (INPATIENT)
Facility: CLINIC | Age: 51
LOS: 6 days | Discharge: HOME | DRG: 235 | End: 2021-01-14
Attending: HOSPITALIST | Admitting: INTERNAL MEDICINE
Payer: COMMERCIAL

## 2021-01-08 ENCOUNTER — APPOINTMENT (OUTPATIENT)
Dept: CARDIOLOGY | Facility: CLINIC | Age: 51
DRG: 287 | End: 2021-01-08
Attending: INTERNAL MEDICINE
Payer: COMMERCIAL

## 2021-01-08 VITALS
WEIGHT: 262.7 LBS | DIASTOLIC BLOOD PRESSURE: 71 MMHG | SYSTOLIC BLOOD PRESSURE: 124 MMHG | OXYGEN SATURATION: 98 % | TEMPERATURE: 98.1 F | RESPIRATION RATE: 16 BRPM | HEART RATE: 68 BPM | BODY MASS INDEX: 35.58 KG/M2 | HEIGHT: 72 IN

## 2021-01-08 DIAGNOSIS — I25.110 CORONARY ARTERY DISEASE INVOLVING NATIVE CORONARY ARTERY OF NATIVE HEART WITH UNSTABLE ANGINA PECTORIS (H): ICD-10-CM

## 2021-01-08 DIAGNOSIS — Z95.1 S/P CABG (CORONARY ARTERY BYPASS GRAFT): Primary | ICD-10-CM

## 2021-01-08 DIAGNOSIS — I20.0 UNSTABLE ANGINA (H): ICD-10-CM

## 2021-01-08 PROBLEM — I25.10 CORONARY ARTERY DISEASE: Status: ACTIVE | Noted: 2021-01-08

## 2021-01-08 LAB
ALBUMIN SERPL-MCNC: 3.9 G/DL (ref 3.4–5)
ALBUMIN UR-MCNC: NEGATIVE MG/DL
ALP SERPL-CCNC: 62 U/L (ref 40–150)
ALT SERPL W P-5'-P-CCNC: 26 U/L (ref 0–70)
ANION GAP SERPL CALCULATED.3IONS-SCNC: 5 MMOL/L (ref 3–14)
APPEARANCE UR: CLEAR
AST SERPL W P-5'-P-CCNC: 23 U/L (ref 0–45)
BACTERIA #/AREA URNS HPF: ABNORMAL /HPF
BILIRUB DIRECT SERPL-MCNC: 0.1 MG/DL (ref 0–0.2)
BILIRUB SERPL-MCNC: 1.2 MG/DL (ref 0.2–1.3)
BILIRUB UR QL STRIP: NEGATIVE
BUN SERPL-MCNC: 9 MG/DL (ref 7–30)
CALCIUM SERPL-MCNC: 9 MG/DL (ref 8.5–10.1)
CHLORIDE SERPL-SCNC: 108 MMOL/L (ref 94–109)
CHOLEST SERPL-MCNC: 226 MG/DL
CO2 SERPL-SCNC: 24 MMOL/L (ref 20–32)
COLOR UR AUTO: YELLOW
CREAT SERPL-MCNC: 0.64 MG/DL (ref 0.66–1.25)
ERYTHROCYTE [DISTWIDTH] IN BLOOD BY AUTOMATED COUNT: 12.1 % (ref 10–15)
GFR SERPL CREATININE-BSD FRML MDRD: >90 ML/MIN/{1.73_M2}
GLUCOSE SERPL-MCNC: 113 MG/DL (ref 70–99)
GLUCOSE UR STRIP-MCNC: NEGATIVE MG/DL
HCT VFR BLD AUTO: 49.7 % (ref 40–53)
HDLC SERPL-MCNC: 45 MG/DL
HGB BLD-MCNC: 17.2 G/DL (ref 13.3–17.7)
HGB UR QL STRIP: NEGATIVE
INTERPRETATION ECG - MUSE: NORMAL
KCT BLD-ACNC: 164 SEC (ref 75–150)
KETONES UR STRIP-MCNC: 10 MG/DL
LDLC SERPL CALC-MCNC: 159 MG/DL
LEUKOCYTE ESTERASE UR QL STRIP: NEGATIVE
MCH RBC QN AUTO: 30.4 PG (ref 26.5–33)
MCHC RBC AUTO-ENTMCNC: 34.6 G/DL (ref 31.5–36.5)
MCV RBC AUTO: 88 FL (ref 78–100)
MRSA DNA SPEC QL NAA+PROBE: NEGATIVE
MUCOUS THREADS #/AREA URNS LPF: PRESENT /LPF
NITRATE UR QL: NEGATIVE
NONHDLC SERPL-MCNC: 181 MG/DL
PH UR STRIP: 6.5 PH (ref 5–7)
PLATELET # BLD AUTO: 264 10E9/L (ref 150–450)
POTASSIUM SERPL-SCNC: 4 MMOL/L (ref 3.4–5.3)
PROT SERPL-MCNC: 7.2 G/DL (ref 6.8–8.8)
RBC # BLD AUTO: 5.65 10E12/L (ref 4.4–5.9)
RBC #/AREA URNS AUTO: 0 /HPF (ref 0–2)
SODIUM SERPL-SCNC: 137 MMOL/L (ref 133–144)
SOURCE: ABNORMAL
SP GR UR STRIP: 1.01 (ref 1–1.03)
SPECIMEN SOURCE: NORMAL
TRIGL SERPL-MCNC: 108 MG/DL
UFH PPP CHRO-ACNC: 0.1 IU/ML
UFH PPP CHRO-ACNC: 0.21 IU/ML
UROBILINOGEN UR STRIP-MCNC: 0 MG/DL (ref 0–2)
WBC # BLD AUTO: 7.3 10E9/L (ref 4–11)
WBC #/AREA URNS AUTO: 0 /HPF (ref 0–5)

## 2021-01-08 PROCEDURE — 210N000002 HC R&B HEART CARE

## 2021-01-08 PROCEDURE — 81001 URINALYSIS AUTO W/SCOPE: CPT | Performed by: PHYSICIAN ASSISTANT

## 2021-01-08 PROCEDURE — 250N000013 HC RX MED GY IP 250 OP 250 PS 637: Performed by: PHYSICIAN ASSISTANT

## 2021-01-08 PROCEDURE — 99152 MOD SED SAME PHYS/QHP 5/>YRS: CPT | Mod: 59 | Performed by: INTERNAL MEDICINE

## 2021-01-08 PROCEDURE — 36415 COLL VENOUS BLD VENIPUNCTURE: CPT | Performed by: INTERNAL MEDICINE

## 2021-01-08 PROCEDURE — 93880 EXTRACRANIAL BILAT STUDY: CPT

## 2021-01-08 PROCEDURE — 250N000011 HC RX IP 250 OP 636: Performed by: INTERNAL MEDICINE

## 2021-01-08 PROCEDURE — 999N000208 ECHOCARDIOGRAM COMPLETE

## 2021-01-08 PROCEDURE — 250N000011 HC RX IP 250 OP 636: Performed by: PHYSICIAN ASSISTANT

## 2021-01-08 PROCEDURE — 93970 EXTREMITY STUDY: CPT

## 2021-01-08 PROCEDURE — 93458 L HRT ARTERY/VENTRICLE ANGIO: CPT | Performed by: INTERNAL MEDICINE

## 2021-01-08 PROCEDURE — 258N000003 HC RX IP 258 OP 636: Performed by: PHYSICIAN ASSISTANT

## 2021-01-08 PROCEDURE — 99233 SBSQ HOSP IP/OBS HIGH 50: CPT | Mod: 57 | Performed by: SURGERY

## 2021-01-08 PROCEDURE — 255N000002 HC RX 255 OP 636: Performed by: INTERNAL MEDICINE

## 2021-01-08 PROCEDURE — 99207 PR APP CREDIT; MD BILLING SHARED VISIT: CPT | Performed by: INTERNAL MEDICINE

## 2021-01-08 PROCEDURE — 85347 COAGULATION TIME ACTIVATED: CPT

## 2021-01-08 PROCEDURE — 4A023N7 MEASUREMENT OF CARDIAC SAMPLING AND PRESSURE, LEFT HEART, PERCUTANEOUS APPROACH: ICD-10-PCS | Performed by: INTERNAL MEDICINE

## 2021-01-08 PROCEDURE — 272N000001 HC OR GENERAL SUPPLY STERILE: Performed by: INTERNAL MEDICINE

## 2021-01-08 PROCEDURE — 93458 L HRT ARTERY/VENTRICLE ANGIO: CPT | Mod: 26 | Performed by: INTERNAL MEDICINE

## 2021-01-08 PROCEDURE — 99232 SBSQ HOSP IP/OBS MODERATE 35: CPT | Performed by: INTERNAL MEDICINE

## 2021-01-08 PROCEDURE — B2111ZZ FLUOROSCOPY OF MULTIPLE CORONARY ARTERIES USING LOW OSMOLAR CONTRAST: ICD-10-PCS | Performed by: INTERNAL MEDICINE

## 2021-01-08 PROCEDURE — 2894A VOIDCORRECT: CPT | Performed by: PHYSICIAN ASSISTANT

## 2021-01-08 PROCEDURE — 71046 X-RAY EXAM CHEST 2 VIEWS: CPT

## 2021-01-08 PROCEDURE — 80048 BASIC METABOLIC PNL TOTAL CA: CPT | Performed by: PHYSICIAN ASSISTANT

## 2021-01-08 PROCEDURE — 93931 UPPER EXTREMITY STUDY: CPT | Mod: LT

## 2021-01-08 PROCEDURE — 87641 MR-STAPH DNA AMP PROBE: CPT | Performed by: SURGERY

## 2021-01-08 PROCEDURE — 87640 STAPH A DNA AMP PROBE: CPT | Performed by: SURGERY

## 2021-01-08 PROCEDURE — 99233 SBSQ HOSP IP/OBS HIGH 50: CPT | Performed by: PHYSICIAN ASSISTANT

## 2021-01-08 PROCEDURE — 85520 HEPARIN ASSAY: CPT | Performed by: INTERNAL MEDICINE

## 2021-01-08 PROCEDURE — 250N000009 HC RX 250: Performed by: INTERNAL MEDICINE

## 2021-01-08 PROCEDURE — 250N000013 HC RX MED GY IP 250 OP 250 PS 637: Performed by: INTERNAL MEDICINE

## 2021-01-08 PROCEDURE — 999N000147 HC STATISTIC PT IP EVAL DEFER

## 2021-01-08 PROCEDURE — 99152 MOD SED SAME PHYS/QHP 5/>YRS: CPT | Performed by: INTERNAL MEDICINE

## 2021-01-08 PROCEDURE — 85027 COMPLETE CBC AUTOMATED: CPT | Performed by: PHYSICIAN ASSISTANT

## 2021-01-08 PROCEDURE — 93306 TTE W/DOPPLER COMPLETE: CPT | Mod: 26 | Performed by: INTERNAL MEDICINE

## 2021-01-08 PROCEDURE — 99153 MOD SED SAME PHYS/QHP EA: CPT | Performed by: INTERNAL MEDICINE

## 2021-01-08 PROCEDURE — 80076 HEPATIC FUNCTION PANEL: CPT | Performed by: PHYSICIAN ASSISTANT

## 2021-01-08 PROCEDURE — 80061 LIPID PANEL: CPT | Performed by: PHYSICIAN ASSISTANT

## 2021-01-08 RX ORDER — LIDOCAINE 40 MG/G
CREAM TOPICAL
Status: CANCELLED | OUTPATIENT
Start: 2021-01-08

## 2021-01-08 RX ORDER — ASPIRIN 81 MG/1
162 TABLET, CHEWABLE ORAL
Status: CANCELLED | OUTPATIENT
Start: 2021-01-08

## 2021-01-08 RX ORDER — ONDANSETRON 2 MG/ML
4 INJECTION INTRAMUSCULAR; INTRAVENOUS EVERY 6 HOURS PRN
Status: DISCONTINUED | OUTPATIENT
Start: 2021-01-08 | End: 2021-01-09

## 2021-01-08 RX ORDER — ASPIRIN 81 MG/1
81 TABLET ORAL DAILY
Status: DISCONTINUED | OUTPATIENT
Start: 2021-01-09 | End: 2021-01-09

## 2021-01-08 RX ORDER — LORAZEPAM 0.5 MG/1
0.5 TABLET ORAL
Status: DISCONTINUED | OUTPATIENT
Start: 2021-01-08 | End: 2021-01-08 | Stop reason: HOSPADM

## 2021-01-08 RX ORDER — ASPIRIN 81 MG/1
162 TABLET, CHEWABLE ORAL
Status: DISCONTINUED | OUTPATIENT
Start: 2021-01-08 | End: 2021-01-09

## 2021-01-08 RX ORDER — FENTANYL CITRATE 50 UG/ML
INJECTION, SOLUTION INTRAMUSCULAR; INTRAVENOUS
Status: DISCONTINUED | OUTPATIENT
Start: 2021-01-08 | End: 2021-01-08 | Stop reason: HOSPADM

## 2021-01-08 RX ORDER — PROCHLORPERAZINE 25 MG
25 SUPPOSITORY, RECTAL RECTAL EVERY 12 HOURS PRN
Status: DISCONTINUED | OUTPATIENT
Start: 2021-01-08 | End: 2021-01-09

## 2021-01-08 RX ORDER — LIDOCAINE 40 MG/G
CREAM TOPICAL
Status: DISCONTINUED | OUTPATIENT
Start: 2021-01-08 | End: 2021-01-08 | Stop reason: HOSPADM

## 2021-01-08 RX ORDER — LIDOCAINE 40 MG/G
CREAM TOPICAL
Status: DISCONTINUED | OUTPATIENT
Start: 2021-01-08 | End: 2021-01-09

## 2021-01-08 RX ORDER — NITROGLYCERIN 5 MG/ML
VIAL (ML) INTRAVENOUS
Status: DISCONTINUED | OUTPATIENT
Start: 2021-01-08 | End: 2021-01-08 | Stop reason: HOSPADM

## 2021-01-08 RX ORDER — AMOXICILLIN 250 MG
2 CAPSULE ORAL 2 TIMES DAILY PRN
Status: DISCONTINUED | OUTPATIENT
Start: 2021-01-08 | End: 2021-01-09

## 2021-01-08 RX ORDER — METOPROLOL TARTRATE 25 MG/1
25 TABLET, FILM COATED ORAL 2 TIMES DAILY
Status: DISCONTINUED | OUTPATIENT
Start: 2021-01-08 | End: 2021-01-09

## 2021-01-08 RX ORDER — NITROGLYCERIN 5 MG/ML
VIAL (ML) INTRAVENOUS
Status: DISCONTINUED
Start: 2021-01-08 | End: 2021-01-08 | Stop reason: HOSPADM

## 2021-01-08 RX ORDER — CHLORHEXIDINE GLUCONATE ORAL RINSE 1.2 MG/ML
10 SOLUTION DENTAL ONCE
Status: CANCELLED | OUTPATIENT
Start: 2021-01-08 | End: 2021-01-08

## 2021-01-08 RX ORDER — SODIUM CHLORIDE 9 MG/ML
INJECTION, SOLUTION INTRAVENOUS CONTINUOUS
Status: DISCONTINUED | OUTPATIENT
Start: 2021-01-08 | End: 2021-01-08 | Stop reason: HOSPADM

## 2021-01-08 RX ORDER — AMOXICILLIN 250 MG
1 CAPSULE ORAL 2 TIMES DAILY PRN
Status: DISCONTINUED | OUTPATIENT
Start: 2021-01-08 | End: 2021-01-09

## 2021-01-08 RX ORDER — CEFAZOLIN SODIUM 2 G/100ML
2 INJECTION, SOLUTION INTRAVENOUS
Status: CANCELLED | OUTPATIENT
Start: 2021-01-08

## 2021-01-08 RX ORDER — ONDANSETRON 4 MG/1
4 TABLET, ORALLY DISINTEGRATING ORAL EVERY 6 HOURS PRN
Status: DISCONTINUED | OUTPATIENT
Start: 2021-01-08 | End: 2021-01-09

## 2021-01-08 RX ORDER — CEFAZOLIN SODIUM 2 G/100ML
2 INJECTION, SOLUTION INTRAVENOUS
Status: COMPLETED | OUTPATIENT
Start: 2021-01-08 | End: 2021-01-09

## 2021-01-08 RX ORDER — CEFAZOLIN SODIUM 1 G/3ML
1 INJECTION, POWDER, FOR SOLUTION INTRAMUSCULAR; INTRAVENOUS SEE ADMIN INSTRUCTIONS
Status: CANCELLED | OUTPATIENT
Start: 2021-01-08

## 2021-01-08 RX ORDER — POLYETHYLENE GLYCOL 3350 17 G/17G
17 POWDER, FOR SOLUTION ORAL DAILY PRN
Status: DISCONTINUED | OUTPATIENT
Start: 2021-01-08 | End: 2021-01-09

## 2021-01-08 RX ORDER — CHLORHEXIDINE GLUCONATE ORAL RINSE 1.2 MG/ML
10 SOLUTION DENTAL ONCE
Status: DISCONTINUED | OUTPATIENT
Start: 2021-01-08 | End: 2021-01-09

## 2021-01-08 RX ORDER — FAMOTIDINE 20 MG/1
20 TABLET, FILM COATED ORAL
Status: CANCELLED | OUTPATIENT
Start: 2021-01-08

## 2021-01-08 RX ORDER — PROCHLORPERAZINE MALEATE 5 MG
10 TABLET ORAL EVERY 6 HOURS PRN
Status: DISCONTINUED | OUTPATIENT
Start: 2021-01-08 | End: 2021-01-09

## 2021-01-08 RX ORDER — ACETAMINOPHEN 650 MG/1
650 SUPPOSITORY RECTAL EVERY 4 HOURS PRN
Status: DISCONTINUED | OUTPATIENT
Start: 2021-01-08 | End: 2021-01-09

## 2021-01-08 RX ORDER — NALOXONE HYDROCHLORIDE 0.4 MG/ML
INJECTION, SOLUTION INTRAMUSCULAR; INTRAVENOUS; SUBCUTANEOUS
Status: DISCONTINUED
Start: 2021-01-08 | End: 2021-01-08 | Stop reason: HOSPADM

## 2021-01-08 RX ORDER — ASPIRIN 81 MG/1
243 TABLET ORAL DAILY
Status: COMPLETED | OUTPATIENT
Start: 2021-01-08 | End: 2021-01-08

## 2021-01-08 RX ORDER — FAMOTIDINE 20 MG/1
20 TABLET, FILM COATED ORAL
Status: COMPLETED | OUTPATIENT
Start: 2021-01-08 | End: 2021-01-09

## 2021-01-08 RX ORDER — LORAZEPAM 2 MG/ML
0.5 INJECTION INTRAMUSCULAR
Status: DISCONTINUED | OUTPATIENT
Start: 2021-01-08 | End: 2021-01-08 | Stop reason: HOSPADM

## 2021-01-08 RX ORDER — ASPIRIN 81 MG/1
81 TABLET, CHEWABLE ORAL
Status: CANCELLED | OUTPATIENT
Start: 2021-01-08

## 2021-01-08 RX ORDER — IOPAMIDOL 755 MG/ML
INJECTION, SOLUTION INTRAVASCULAR
Status: DISCONTINUED | OUTPATIENT
Start: 2021-01-08 | End: 2021-01-08 | Stop reason: HOSPADM

## 2021-01-08 RX ORDER — LISINOPRIL 20 MG/1
20 TABLET ORAL DAILY
Status: DISCONTINUED | OUTPATIENT
Start: 2021-01-09 | End: 2021-01-09

## 2021-01-08 RX ORDER — ROSUVASTATIN CALCIUM 20 MG/1
20 TABLET, COATED ORAL DAILY
Status: DISCONTINUED | OUTPATIENT
Start: 2021-01-09 | End: 2021-01-09

## 2021-01-08 RX ORDER — ACETAMINOPHEN 325 MG/1
650 TABLET ORAL EVERY 4 HOURS PRN
Status: DISCONTINUED | OUTPATIENT
Start: 2021-01-08 | End: 2021-01-09

## 2021-01-08 RX ORDER — POTASSIUM CHLORIDE 1500 MG/1
20 TABLET, EXTENDED RELEASE ORAL
Status: DISCONTINUED | OUTPATIENT
Start: 2021-01-08 | End: 2021-01-08 | Stop reason: HOSPADM

## 2021-01-08 RX ORDER — LISINOPRIL 20 MG/1
20 TABLET ORAL DAILY
Status: DISCONTINUED | OUTPATIENT
Start: 2021-01-08 | End: 2021-01-08 | Stop reason: HOSPADM

## 2021-01-08 RX ORDER — BISACODYL 10 MG
10 SUPPOSITORY, RECTAL RECTAL DAILY PRN
Status: DISCONTINUED | OUTPATIENT
Start: 2021-01-08 | End: 2021-01-09

## 2021-01-08 RX ORDER — HEPARIN SODIUM 10000 [USP'U]/100ML
0-5000 INJECTION, SOLUTION INTRAVENOUS CONTINUOUS
Status: DISCONTINUED | OUTPATIENT
Start: 2021-01-08 | End: 2021-01-09

## 2021-01-08 RX ORDER — LIDOCAINE HYDROCHLORIDE 10 MG/ML
INJECTION, SOLUTION EPIDURAL; INFILTRATION; INTRACAUDAL; PERINEURAL
Status: DISCONTINUED
Start: 2021-01-08 | End: 2021-01-08 | Stop reason: HOSPADM

## 2021-01-08 RX ORDER — ASPIRIN 81 MG/1
81 TABLET, CHEWABLE ORAL
Status: DISCONTINUED | OUTPATIENT
Start: 2021-01-08 | End: 2021-01-09

## 2021-01-08 RX ORDER — CEFAZOLIN SODIUM 1 G/3ML
1 INJECTION, POWDER, FOR SOLUTION INTRAMUSCULAR; INTRAVENOUS SEE ADMIN INSTRUCTIONS
Status: DISCONTINUED | OUTPATIENT
Start: 2021-01-08 | End: 2021-01-09

## 2021-01-08 RX ADMIN — METOPROLOL TARTRATE 25 MG: 25 TABLET, FILM COATED ORAL at 08:47

## 2021-01-08 RX ADMIN — HEPARIN SODIUM 1500 UNITS/HR: 10000 INJECTION, SOLUTION INTRAVENOUS at 21:34

## 2021-01-08 RX ADMIN — ROSUVASTATIN 20 MG: 20 TABLET, FILM COATED ORAL at 08:47

## 2021-01-08 RX ADMIN — ASPIRIN 243 MG: 81 TABLET ORAL at 12:38

## 2021-01-08 RX ADMIN — HEPARIN SODIUM 1350 UNITS/HR: 10000 INJECTION, SOLUTION INTRAVENOUS at 03:41

## 2021-01-08 RX ADMIN — SODIUM CHLORIDE: 9 INJECTION, SOLUTION INTRAVENOUS at 12:33

## 2021-01-08 RX ADMIN — HEPARIN SODIUM 1500 UNITS/HR: 10000 INJECTION, SOLUTION INTRAVENOUS at 11:07

## 2021-01-08 RX ADMIN — ASPIRIN 81 MG: 81 TABLET, CHEWABLE ORAL at 08:47

## 2021-01-08 RX ADMIN — LISINOPRIL 20 MG: 20 TABLET ORAL at 08:47

## 2021-01-08 RX ADMIN — SODIUM CHLORIDE: 9 INJECTION, SOLUTION INTRAVENOUS at 05:51

## 2021-01-08 RX ADMIN — METOPROLOL TARTRATE 25 MG: 25 TABLET, FILM COATED ORAL at 21:28

## 2021-01-08 RX ADMIN — HUMAN ALBUMIN MICROSPHERES AND PERFLUTREN 3 ML: 10; .22 INJECTION, SOLUTION INTRAVENOUS at 08:31

## 2021-01-08 ASSESSMENT — ACTIVITIES OF DAILY LIVING (ADL)
ADLS_ACUITY_SCORE: 14

## 2021-01-08 NOTE — PLAN OF CARE
Diagnosis: unstable angina of unknown origin  History: hypertension  Labs/Protocols: monitor troponins (draws from this evening were WNL, see results tab) and monitor heparin 10a levels. First hep 10 a draw at 1900 was within therapeutic range, and no changes were made to heparin dosing. Will have a re-draw at 0145 in the morning.   Vitals: BP (!) 155/96   Pulse 79   Temp 97.3  F (36.3  C) (Oral)   Resp 16   Ht 1.829 m (6')   Wt 119.2 kg (262 lb 11.2 oz)   SpO2 98%   BMI 35.63 kg/m    Telemetry: SR  Respiratory: WNL at this time. Pt states RAY and chest discomfort with activity are what brought him to ER.   Neuro: A&Ox4. Pleasant, polite, cooperative  GI/: Pt denies any problems with GI/, voids appropriately and has regular BMs  Skin: Intact and WNL  LDAs: PIV infusing heparin at 1200 units an hour and NS at 100ml/hr  Diet: low fat/low NA no caffeine. NPO at midnight for possible cardiac workup tomorrow  Activity: up ad giovanny. Steady gait  Plan: monitor hep 10 a levels and trend troponin levels. Monitor BP, pt has had adjustments to medication regimen.     Rafa Cantu RN on 1/7/2021 at 10:58 PM

## 2021-01-08 NOTE — CONSULTS
Cardiovascular and Thoracic Surgery Consultation      Mor Monson MRN# [id number]   YOB: 1970 Age: 50 year old   Date of Admission: 1/8/2021     Reason for consult: Coronary artery disease           Assessment and Plan:   Mor Monson is a 51 YO male with a past medical history for hypertension, HLD, medication noncompliance who presented to [facility] with progressive RAY, exercise intolerance and chest tightness for the past 6-8 weeks. He was taken for coronary angiogram today and found to have severe left main disease with 65% distal RCA disease. He was transferred to [facility] for cardiac surgery evaluation.     Plan for CABG this admission  Will tentatively plan for IABP tomorrow and possible CABG on Sunday  Will need carotid ultrasound, lower extremity vein mapping, type and screen.     Pre op education was done with patient   Patients wife was updated on the plan as well.         Allens test was performed on left upper extremity during our visit and patient has good ulnar refill if radial artery harvest is considered for conduit. Discussed with RN the possibility of moving his IVs in his left arm to his right arm.         Chief Complaint:   Chest pain    History is obtained from the patient and chart review         History of Present Illness:   This patient is a 50 year old male with past medical history of hypertension, HLD, medication noncompliance who presented to [facility] with progressive RAY, exercise intolerance and chest tightness for the past 6-8 weeks. He is generally quite active at baseline and was walking about 2 miles per day but stopped due to exercise intolerance. 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 He denies pain at rest. He denies syncope or presyncope, lower extremity swelling. He has a history of varicose veins but they do not bother him.  Denies dizziness/lightheadedness, nausea and vomiting. He denies any history of chest trauma.        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 He does not smoke and only drinks 1-2 drinks of tequila on the weekends. He denies family history of heart disease. His wife runs a  out of their home. Neither he or his wife have had a confirmed covid diagnosis but their youngest child was covid positive in October and has since recovered            Past Medical History:   No past medical history on file.          Past Surgical History:   No past surgical history on file.            Social History:     Social History     Tobacco Use     Smoking status: Not on file   Substance Use Topics     Alcohol use: Not on file             Family History:   No family history on file.          Immunizations:     There is no immunization history on file for this patient.          Allergies:   No Known Allergies          Medications:     Medications Prior to Admission   Medication Sig Dispense Refill Last Dose     ciprofloxacin-dexamethasone (CIPRODEX) 0.3-0.1 % otic suspension Place 4 drops into both ears 2 times daily as needed        doxycycline hyclate (VIBRA-TABS) 100 MG tablet Take 100 mg by mouth 2 times daily as needed (eye infection)        ibuprofen (ADVIL/MOTRIN) 200 MG tablet Take 200-600 mg by mouth every 6 hours as needed for mild pain                Review of Systems:   The 10 point Review of Systems is negative other than noted in the HPI            Physical Exam:   Vitals were reviewed      BP: (!) 156/101 Pulse: 65   Resp: 12   O2 Device: None (Room air)    Constitutional:   awake, alert, cooperative, no apparent distress, and appears stated age     Lungs:   No increased work of breathing, good air exchange, clear to auscultation bilaterally, no crackles or wheezing     Cardiovascular:   Normal apical impulse, regular rate and rhythm, normal S1 and S2, no S3 or S4, and no murmur noted     Chest /  Breast:   No deformities or scars         Abdomen:   No scars, normal bowel sounds, soft, non-distended, non-tender, no masses palpated, no hepatosplenomegally       Musculoskeletal:   There is no redness, warmth, or swelling of the joints.  Full range of motion noted.  Motor strength is 5 out of 5 all extremities bilaterally.  Tone is normal.     Skin:   + left groin hematoma from cath site, normal skin color, texture, turgor and no redness, warmth, or swelling              Data:   All laboratory data reviewed  All cardiac studies reviewed by me.  All imaging studies reviewed by me.

## 2021-01-08 NOTE — PLAN OF CARE
PT: Received orders for cardiac rehab evaluation.  Per most recent cardiology note, patient with unstable angina, s/p coronary angiogram showing severe distal LM disease, recommending transfer to [facility] for CABG consideration.  Will complete Cardiac Rehab orders for [facility], please reconsult if patient does not transfer to [facility] and has rehab needs.

## 2021-01-08 NOTE — H&P
[facility]    History and Physical  Hospitalist       Date of Admission:  1/8/2021    Assessment & Plan   Mor Monson is a 50 year old male admitted on 1/8/2021. PMHx obesity, DLD, HTN who was admitted to [facility] on 1/7/2020 with unstable angina found to have severe left main disease. He is transferred to [facility] for CABG evaluation.     Unstable angina assoc with severe left main disease.  Heart cath showed an ulcerative severe lesion in distal LMA extending into ostia of LAD and LCx as well as moderate distal RCA disease. TTE showing EF 50-55% and mid-anterolateral and mid-anterior wall with moderate hypokinesis.    HDL 45 .  - Admit to CCU.  - Tele, I&Os, daily weights.  - Appreciate CV surgery consult.    - Appreciate cardiology consult.    - ASA, Lopressor, lisinopril, rosuvastatin.   - NPO at midnight.    - Resume heparin drip btwn 6-8p, nursing clarifying with cardiology given hematoma on arrival at femoral access site (seems better now).     HTN, DLD.  - Statin, ace, and beta blocker initiated.    Impaired fasting glucose. Mild hyperglycemia (102) with fasting labs.  HGB A1c of 5.5%.       Medication noncompliance.  Discontinued atorvastatin and amlodipine when he started exercising regularly and saw improved blood pressure.     MDD with anxiety. No longer on any medications.       Obesity, BMI of 35.63.    - Cont regular exercise when safe from cardiovascular standpoint. Further mngt per PCP.    DVT Prophylaxis: Pneumatic Compression Devices and resume heparin drip following post cath protocol  Code Status: Full Code    This patient was discussed with Dr. Castellanos of the Hospitalist Service who agrees with current plans as outlined above.    Disposition: Expected discharge in several days once cv surgery and cardiology plans solidified and implemented.    JoAnna K. Barthell, PA-C    Primary Care Physician   Asad Goodwin    Chief Complaint    CABG eval    History is obtained from the patient    History of Present Illness   Mor Monson is a 50 year old male admitted on 1/8/2021. PMHx obesity, DLD, HTN who was admitted to [facility] on 1/7/2020 with unstable angina found to have severe left main disease. He is transferred to [facility] for CABG evaluation.     Patient admitted to [facility] 1/7/2021 with chest tightness and RAY that has been coming own with less and less exertion over the last 6-8 weeks. BPs were 220s on initial presentation to [facility] ED. Admitted with heparin drip for unstable angina. TTE revealed preserved EF and WMA in the mid-anterolateral and mid-anterior region. Heart cath showed an ulcerative severe lesion in distal LMA extending into ostia of LAD and LCx as well as moderate distal RCA disease. Recommended transfer to [facility] for CABG evaluation.    Has not had recurrence of symptoms since initial presentation as only occurs with exertion. No recent fevers, URI symptoms, coughing, SOB at rest, abd pain, n/v/d/c, or LE edema. Has hx HTN and DLD prescribed atorvastatin and amlodipine at end of 2018 or 2019, but discontinued after started exercising more regularly and noting improvement in his blood pressure. Never smoker. Consumes about 4 alcoholic beverages per week. No known FHx heart disease.     PAST MEDICAL HISTORY  Hypertension.  Dyslipidemia.  Obesity.  Depression/anxiety.    PAST SURGICAL HISTORY  Hernia repair.  [place] teeth extraction.    HOME MEDICATIONS  Prior to Admission medications    Medication Sig Last Dose Taking? Auth Provider   ciprofloxacin-dexamethasone (CIPRODEX) 0.3-0.1 % otic suspension Place 4 drops into both ears 2 times daily as needed   Unknown, Entered By History   doxycycline hyclate (VIBRA-TABS) 100 MG tablet Take 100 mg by mouth 2 times daily as needed (eye infection)   Unknown, Entered By History   ibuprofen (ADVIL/MOTRIN) 200 MG tablet Take 200-600 mg by mouth every 6 hours as  needed for mild pain   Unknown, Entered By History       ALLERGIES  No Known Allergies    SOCIAL HISTORY  Never smoker.  Consumes 4 alcoholic beverages per week.  No illicit drug use.  Works as a .    FAMILY HISTORY  Aunts and uncles with history CVA and cancer.  No known CAD, CVA, cancer in his immediate family.  Father with type 2 diabetes.    REVIEW OF SYSTEMS  A 10 point ROS was negative other than the symptoms noted above in the HPI.    Physical Exam   Nursing Notes Reviewed.  BP (!) 156/101   Pulse 65   Resp 12    General:  Appears stated age in no acute distress. A&O x 3.  Skin:  Warm, dry. No rashes or lesions on exposed skin.  HEENT:  Normocephalic, atraumatic; EOMs grossly intact.  Neck:  Supple.  Chest:  Breath sounds CTA anteriorly and no increased work of breathing.  Right femoral access site is soft with no overt ecchymosis.  Cardiovascular:  RRR, no rub or murmur. No peripheral edema.  Abdomen:  Soft, obese, non-tender, non-distended.  Musculoskeletal:  Moves all four extremities.  Neurological:  CN 2-12 grossly intact.    Data   Data reviewed today:  I personally reviewed the EKG tracing showing sinus rhythm and the chest x-ray image(s) showing no acute cardiopulmonary disease.  Recent Labs   Lab 01/08/21  0954 01/07/21  1900 01/07/21  1624 01/07/21  1158   WBC 7.3  --   --  6.5   HGB 17.2  --   --  17.0   MCV 88  --   --  91     --   --  252     --   --  138   POTASSIUM 4.0  --   --  3.6   CHLORIDE 108  --   --  107   CO2 24  --   --  27   BUN 9  --   --  14   CR 0.64*  --   --  0.77   ANIONGAP 5  --   --  4   JOSE ALBERTO 9.0  --   --  9.3   *  --   --  102*   ALBUMIN 3.9  --   --  4.2   PROTTOTAL 7.2  --   --  7.8   BILITOTAL 1.2  --   --  1.0   ALKPHOS 62  --   --  69   ALT 26  --   --  31   AST 23  --   --  17   TROPI  --  <0.015 <0.015 <0.015       Imaging:  Recent Results (from the past 24 hour(s))   Echocardiogram Complete    Narrative     [id number]  [facility]  [id number]  [id number]^BIBIANA^DEMETRIO^NELL           [facility]  Echocardiography Laboratory  [address] [place] [address]        Name: SONYA KERNS  MRN: [id number]  : 1970  Study Date: 2021 08:06 AM  Age: 50 yrs  Gender: Male  Patient Location: [facility]  Reason For Study: Chest Pain  Ordering Physician: DEMETRIO MCCARTY  Performed By: Alanis Rosen     BSA: 2.4 m2  Height: 72 in  Weight: 262 lb  HR: 74  BP: 137/82 mmHg  _____________________________________________________________________________  __        Procedure  Complete Portable Echo Adult. Optison (NDC #[id number]) given intravenously.  _____________________________________________________________________________  __        Interpretation Summary     The visual ejection fraction is estimated at 50-55%.  Left ventricular systolic function is borderline reduced.  There are regional wall motion abnormalities as specified.  Mild aortic root dilatation.  The ascending aorta is Mildly dilated.  The study was technically difficult.  _____________________________________________________________________________  __        Left Ventricle  The left ventricle is normal in size. There is normal left ventricular wall  thickness. Diastolic Doppler findings (E/E' ratio and/or other parameters)  suggest left ventricular filling pressures are indeterminate. The visual  ejection fraction is estimated at 50-55%. Left ventricular systolic function  is borderline reduced. The mid anterolateral and mid anterior wall appear  moderately hypokinetic. There are regional wall motion abnormalities as  specified.     Right Ventricle  The right ventricle is normal in size and function.     Atria  Normal left atrial size. Right atrial size is normal. There is no color  Doppler evidence of an atrial shunt.     Mitral Valve  The mitral valve leaflets are mildly thickened. There is mild (1+) mitral  regurgitation.        Tricuspid Valve  There is  trace tricuspid regurgitation. The right ventricular systolic  pressure is approximated at 31.4 mmHg plus the right atrial pressure.     Aortic Valve  The aortic valve is trileaflet. There is mild trileaflet aortic sclerosis.  There is trace aortic regurgitation. No hemodynamically significant valvular  aortic stenosis.     Pulmonic Valve  There is no pulmonic valvular regurgitation. Normal pulmonic valve velocity.     Vessels  Mild aortic root dilatation. The ascending aorta is Mildly dilated. The  inferior vena cava was normal in size with preserved respiratory variability.  IVC diameter <2.1 cm collapsing >50% with sniff suggests a normal RA pressure  of 3 mmHg.     Pericardium  There is no pericardial effusion.        Rhythm  Sinus rhythm was noted.  _____________________________________________________________________________  __  MMode/2D Measurements & Calculations     IVSd: 1.1 cm  LVIDd: 5.2 cm  LVIDs: 3.4 cm  LVPWd: 1.1 cm  FS: 34.8 %  LV mass(C)d: 227.4 grams  LV mass(C)dI: 95.2 grams/m2  Ao root diam: 4.3 cm  LA dimension: 4.2 cm  asc Aorta Diam: 4.3 cm  LA/Ao: 0.97  LVOT diam: 2.6 cm  LVOT area: 5.3 cm2     EF(MOD-bp): 53.2 %  LA Volume (BP): 88.9 ml  LA Volume Index (BP): 37.2 ml/m2     RWT: 0.44        Doppler Measurements & Calculations  MV E max rachel: 70.3 cm/sec  MV A max rachel: 50.9 cm/sec  MV E/A: 1.4  MV max PG: 3.5 mmHg  MV mean P.8 mmHg  MV V2 VTI: 25.8 cm  MV dec time: 0.25 sec  PA acc time: 0.11 sec  TR max rachel: 280.2 cm/sec  TR max P.4 mmHg  E/E' av.4  Lateral E/e': 8.4  Medial E/e': 10.4              _____________________________________________________________________________  __        Report approved by: Brie Malagon 2021 09:53 AM      Cardiac Catheterization    Narrative      Left ventricular filling pressures are normal.    Dist LM lesion is 90% stenosed.    Ost LAD lesion is 50% stenosed.    Prox LAD lesion is 15% stenosed.    Prox Cx lesion is 15%  stenosed.    Prox RCA lesion is 20% stenosed.    Dist RCA lesion is 65% stenosed.    RPDA lesion is 10% stenosed.     1. Ulcerative severe lesion in distal LMA extending into ostia of Lad,   Lcx,   2. Moderate distal RCA  ST elevation noted with each LCA injection. Pt is stable  I have discussed case with Margarita Davis, Hospitalist @[facility], and [facility]   [place]  Will need CABG to Lad, Lcx-- I dont think high diagonal needs cab and I   suspect distal RCA is not flow limiting  May need IABP pre-op.  VM left with wife Lindy (per pt request)   Restart heparin after 6-7pm tonight if femoral area ok

## 2021-01-08 NOTE — PLAN OF CARE
"Denies CP, SOB. Pt states only SOB \"when walking a few miles/lifting weights\". Denies SOB now. Hep 12 ml/hour and  ml/hour. Pt decline hospital socks despite verbal education on fall prevention. Pt decline staff assistance when ambulating unless he called for that purpose. Continue to monitor and with plan of care. NPO since 0000.   "

## 2021-01-08 NOTE — PROGRESS NOTES
[facility]    Medicine Progress Note - Hospitalist Service       Date of Admission:  1/7/2021  Date of Service: 01/08/2021    Assessment & Plan     Mor Monson is a 50 year old male with a PMH significant for HTN (not on BP meds), who presents with 6-8 weeks of progressive RAY, decreased exercise tolerance and chest tightness with exertion. It has been escalating since [place] and therefore stopped his daily walks. He was seen by PCP virtually to get outpt Stress testing but given the escalating nature, concerning for unstable angina, he was directed to come to the ED for further evaluation.      Work up in the ED reveals negative troponin and non ischemic EKG. Cardiology was contacted due to classic history for unstable angina, patient was started on heparin drip and recommended for admission overnight with cardiology evaluation      1.  Progressive dyspnea on exertion, chest discomfort concerning for unstable angina  --Patient has fairly classic story for angina. Has no family or personal history of coronary disease.,  Has had past stress echo that was negative in 2018    --Serial troponin negative  --Seen by cardiology who discussed with patient about potentially stress test versus coronary angiogram.  Patient tells me this morning that he is leaning towards having an angiogram.  Also noted subsequently his echo shows EF slightly reduced and regional wall motion normalities.  Noted plan for coronary angiogram later today  --Continue heparin drip, baby aspirin  --A1c is 5.5, LDL of 159, elevated.  Started on rosuvastatin  --Start blood pressure medications for hypertension (see below)  --N.p.o. after midnight    2.  Hypertension-blood pressure elevated  --We will start metoprolol 25 twice daily with parameters, as well as lisinopril  --Titrate medication as BP allows.  Lisinopril increased to 20 mg today  --Patient tells me that he checks his blood pressure regularly at home and  usually it is systolic 130-140, which is why he was not taking any medications.  Recommended getting a new machine and regular follow-up with his outpatient providers as he likely needs antihypertensives at this stage     3.  History of depression anxiety       Diet: NPO per Anesthesia Guidelines for Procedure/Surgery Except for: Meds, Ice Chips  NPO for Medical/Clinical Reasons Except for: Meds    DVT Prophylaxis: Heparin   Cr Catheter: not present  Code Status: Full Code      Disposition Plan   Expected discharge: Tomorrow, recommended to prior living arrangement once Cardiac work-up is completed.  Entered: Aravind Mesa MD 01/08/2021, 1:02 PM       The patient's care was discussed with the Bedside Nurse, Patient and Cardiology Consultant.    Aravind Mesa MD  Hospitalist Service  [facility]    ______________________________________________________________________    Interval History   Chart reviewed and patient seen. Case discussed with nursing staff.     Patient feels well, Denies any chest pain, shortness of breath. No reports of abdominal pain or vomiting. No new complaints voiced otherwise.       Data reviewed today: I reviewed all medications, new labs and imaging results over the last 24 hours. I personally reviewed    Physical Exam   Vital Signs: Temp: 98.1  F (36.7  C) Temp src: Oral BP: (!) 156/99 Pulse: 72   Resp: 18 SpO2: 98 % O2 Device: None (Room air)    Weight: 262 lbs 11.2 oz    GENERAL:  Awake and alert, No acute distress.  PSYCH: appropriate affect, no acute agitation   HEENT:  Neck is Supple, trachea is midline, EOMI, conjunctiva clear  CARDIOVASCULAR: Regular rate and rhythm, Normal S1, S2, no loud murmurs, no rubs or gallops.   PULMONARY:  Clear to auscultation bilaterally. Good air entry on both sides  GI: Abdomen is soft, non tender, non-distended, bowel sounds present. No rebound or guarding   SKIN:  No cyanosis or clubbing, no obvious exanthems on  exposed areas   MSK: Extremities are warm and well perfused. No pitting edema   Neuro: Awake and oriented x 3. Moving all extremities with good strength     Data   Recent Labs   Lab 21  0954 21  1900 21  1624 21  1158   WBC 7.3  --   --  6.5   HGB 17.2  --   --  17.0   MCV 88  --   --  91     --   --  252     --   --  138   POTASSIUM 4.0  --   --  3.6   CHLORIDE 108  --   --  107   CO2 24  --   --  27   BUN 9  --   --  14   CR 0.64*  --   --  0.77   ANIONGAP 5  --   --  4   JOSE ALBERTO 9.0  --   --  9.3   *  --   --  102*   ALBUMIN  --   --   --  4.2   PROTTOTAL  --   --   --  7.8   BILITOTAL  --   --   --  1.0   ALKPHOS  --   --   --  69   ALT  --   --   --  31   AST  --   --   --  17   TROPI  --  <0.015 <0.015 <0.015       Recent Results (from the past 24 hour(s))   Echocardiogram Complete    Narrative    [id number]  [id number]  [id number]  [id number]^BIBIANA^DEMETRIO^NELL           [facility]  Echocardiography Laboratory  [address] [place] [address]        Name: SONYA KERNS  MRN: [id number]  : 1970  Study Date: 2021 08:06 AM  Age: 50 yrs  Gender: Male  Patient Location: [facility]  Reason For Study: Chest Pain  Ordering Physician: DEMETRIO MCCARTY  Performed By: Alanis Rosen     BSA: 2.4 m2  Height: 72 in  Weight: 262 lb  HR: 74  BP: 137/82 mmHg  _____________________________________________________________________________  __        Procedure  Complete Portable Echo Adult. Optison (NDC #[id number]) given intravenously.  _____________________________________________________________________________  __        Interpretation Summary     The visual ejection fraction is estimated at 50-55%.  Left ventricular systolic function is borderline reduced.  There are regional wall motion abnormalities as specified.  Mild aortic root dilatation.  The ascending aorta is Mildly dilated.  The study was technically  difficult.  _____________________________________________________________________________  __        Left Ventricle  The left ventricle is normal in size. There is normal left ventricular wall  thickness. Diastolic Doppler findings (E/E' ratio and/or other parameters)  suggest left ventricular filling pressures are indeterminate. The visual  ejection fraction is estimated at 50-55%. Left ventricular systolic function  is borderline reduced. The mid anterolateral and mid anterior wall appear  moderately hypokinetic. There are regional wall motion abnormalities as  specified.     Right Ventricle  The right ventricle is normal in size and function.     Atria  Normal left atrial size. Right atrial size is normal. There is no color  Doppler evidence of an atrial shunt.     Mitral Valve  The mitral valve leaflets are mildly thickened. There is mild (1+) mitral  regurgitation.        Tricuspid Valve  There is trace tricuspid regurgitation. The right ventricular systolic  pressure is approximated at 31.4 mmHg plus the right atrial pressure.     Aortic Valve  The aortic valve is trileaflet. There is mild trileaflet aortic sclerosis.  There is trace aortic regurgitation. No hemodynamically significant valvular  aortic stenosis.     Pulmonic Valve  There is no pulmonic valvular regurgitation. Normal pulmonic valve velocity.     Vessels  Mild aortic root dilatation. The ascending aorta is Mildly dilated. The  inferior vena cava was normal in size with preserved respiratory variability.  IVC diameter <2.1 cm collapsing >50% with sniff suggests a normal RA pressure  of 3 mmHg.     Pericardium  There is no pericardial effusion.        Rhythm  Sinus rhythm was noted.  _____________________________________________________________________________  __  MMode/2D Measurements & Calculations     IVSd: 1.1 cm  LVIDd: 5.2 cm  LVIDs: 3.4 cm  LVPWd: 1.1 cm  FS: 34.8 %  LV mass(C)d: 227.4 grams  LV mass(C)dI: 95.2 grams/m2  Ao root diam: 4.3  cm  LA dimension: 4.2 cm  asc Aorta Diam: 4.3 cm  LA/Ao: 0.97  LVOT diam: 2.6 cm  LVOT area: 5.3 cm2     EF(MOD-bp): 53.2 %  LA Volume (BP): 88.9 ml  LA Volume Index (BP): 37.2 ml/m2     RWT: 0.44        Doppler Measurements & Calculations  MV E max rachel: 70.3 cm/sec  MV A max rachel: 50.9 cm/sec  MV E/A: 1.4  MV max PG: 3.5 mmHg  MV mean P.8 mmHg  MV V2 VTI: 25.8 cm  MV dec time: 0.25 sec  PA acc time: 0.11 sec  TR max rachel: 280.2 cm/sec  TR max P.4 mmHg  E/E' av.4  Lateral E/e': 8.4  Medial E/e': 10.4              _____________________________________________________________________________  __        Report approved by: Brie Malagon 2021 09:53 AM        Medications     heparin 1,500 Units/hr (21 1107)     - MEDICATION INSTRUCTIONS -       - MEDICATION INSTRUCTIONS -       sodium chloride 150 mL/hr at 21 1233     sodium chloride 100 mL/hr at 21 0551       aspirin  81 mg Oral Daily     lidocaine (PF)         lisinopril  20 mg Oral Daily     metoprolol tartrate  25 mg Oral BID     naloxone         nitroGLYcerin in D5W         rosuvastatin  20 mg Oral Daily     senna-docusate  1 tablet Oral BID    Or     senna-docusate  2 tablet Oral BID     sodium chloride (PF)  3 mL Intracatheter Q8H     sodium chloride (PF)  3 mL Intracatheter Q8H

## 2021-01-08 NOTE — PROGRESS NOTES
[facility]  Cardiology Progress Note    Date of Service (when I saw the patient): 01/08/2021    Summary: Mor Monson is a 50 year old male with history of untreated HTN, who was admitted on 1/7/2021 with chest discomfort.       Interval History   Tele: SR    No sxs here         Assessment & Plan   Exertional chest discomfort concerning for USA  - Stress echo 8/2018 through the [employer] system that was negative for evidence of inducible ischemia  - Progressive sxs over the past couple months.  However, chest discomfort is sporadic as at times he can have no sxs despite exertion  - Trops WNL  - ECG shows NSR without acute changes  - Heparin, ASA, metoprolol 25 mg BID, lisinopril 20 mg, Crestor 20 mg, SL nitroglycerin prn  - Echo 1/8/2021: EF 50-55%, mid anterolateral and mid anterior wall appear moderately hypokinetic  - Cardiac cath possible intervention today   * Risks and benefits of coronary angiogram discussed today including, bleeding, bruising, infection, allergic reaction, kidney damage (including need for dialysis), stroke, heart attack, vascular damage, emergency open heart surgery, up to and including death.  Patient indicates understanding and is agreeable to proceed.     * No apparent CI to ALLY   * No known contrast dye allergy   * He is NPO since 2300 last night  - Further recs pending  - If no obstructive disease, sxs likely from hypertension/hypertensive response to exercise      HTN  - Initial BP in the ER was 219/121  - Started metoprolol 25 mg BID, lisinopril 10 mg.  BPs still elevated, increase lisinopril to 20 mg today  - Continue to follow          Daljit Rodas PA-C      Patient Active Problem List   Diagnosis     Unstable angina (H)       Physical Exam   Temp: 98.1  F (36.7  C) Temp src: Oral BP: (!) 156/99 Pulse: 72   Resp: 18 SpO2: 98 % O2 Device: None (Room air)    Vitals:    01/07/21 1135 01/07/21 1339 01/07/21 1603   Weight: 117.9 kg (260 lb) 119.9 kg  (264 lb 5.3 oz) 119.2 kg (262 lb 11.2 oz)     Vital Signs with Ranges  Temp:  [97  F (36.1  C)-98.1  F (36.7  C)] 98.1  F (36.7  C)  Pulse:  [] 72  Resp:  [13-18] 18  BP: (137-219)/() 156/99  SpO2:  [94 %-99 %] 98 %  No intake/output data recorded.    Constitutional: NAD.   Respiratory: CTAB.   Cardiovascular: RRR, s1s2, no murmur  GI: soft, BS+  Skin: warm, no rashes  Musculoskeletal: Moving all extremities  Neurologic: Alert, oriented x 3  Neuropsychiatric: Normal affect       Data   Recent Labs   Lab 21  1900 21  1624 21  1158   WBC  --   --  6.5   HGB  --   --  17.0   MCV  --   --  91   PLT  --   --  252   NA  --   --  138   POTASSIUM  --   --  3.6   CHLORIDE  --   --  107   CO2  --   --  27   BUN  --   --  14   CR  --   --  0.77   ANIONGAP  --   --  4   JOSE ALBERTO  --   --  9.3   GLC  --   --  102*   ALBUMIN  --   --  4.2   PROTTOTAL  --   --  7.8   BILITOTAL  --   --  1.0   ALKPHOS  --   --  69   ALT  --   --  31   AST  --   --  17   TROPI <0.015 <0.015 <0.015     Recent Results (from the past 24 hour(s))   XR Chest 2 Views    Narrative    CHEST TWO VIEWS   2021 12:39 PM     HISTORY: Exertional chest pain.    COMPARISON: None.      Impression    IMPRESSION: No acute cardiopulmonary disease.    EWA BAEZ MD   Echocardiogram Complete    Narrative    [id number]  [id number]  [id number]  [id number]^BIBIANA^DEMETRIO^NELL           [facility]  Echocardiography Laboratory  [address] [place] [address]        Name: SONYA KERNS  MRN: [id number]  : 1970  Study Date: 2021 08:06 AM  Age: 50 yrs  Gender: Male  Patient Location: [facility]  Reason For Study: Chest Pain  Ordering Physician: DEMETRIO MCCARTY  Performed By: Alanis Rosen     BSA: 2.4 m2  Height: 72 in  Weight: 262 lb  HR: 74  BP: 137/82 mmHg  _____________________________________________________________________________  __        Procedure  Complete Portable Echo Adult. [employer] (NDC #[id number]) given  intravenously.  _____________________________________________________________________________  __        Interpretation Summary     The visual ejection fraction is estimated at 50-55%.  Left ventricular systolic function is borderline reduced.  There are regional wall motion abnormalities as specified.  Mild aortic root dilatation.  The ascending aorta is Mildly dilated.  The study was technically difficult.  _____________________________________________________________________________  __        Left Ventricle  The left ventricle is normal in size. There is normal left ventricular wall  thickness. Diastolic Doppler findings (E/E' ratio and/or other parameters)  suggest left ventricular filling pressures are indeterminate. The visual  ejection fraction is estimated at 50-55%. Left ventricular systolic function  is borderline reduced. The mid anterolateral and mid anterior wall appear  moderately hypokinetic. There are regional wall motion abnormalities as  specified.     Right Ventricle  The right ventricle is normal in size and function.     Atria  Normal left atrial size. Right atrial size is normal. There is no color  Doppler evidence of an atrial shunt.     Mitral Valve  The mitral valve leaflets are mildly thickened. There is mild (1+) mitral  regurgitation.        Tricuspid Valve  There is trace tricuspid regurgitation. The right ventricular systolic  pressure is approximated at 31.4 mmHg plus the right atrial pressure.     Aortic Valve  The aortic valve is trileaflet. There is mild trileaflet aortic sclerosis.  There is trace aortic regurgitation. No hemodynamically significant valvular  aortic stenosis.     Pulmonic Valve  There is no pulmonic valvular regurgitation. Normal pulmonic valve velocity.     Vessels  Mild aortic root dilatation. The ascending aorta is Mildly dilated. The  inferior vena cava was normal in size with preserved respiratory variability.  IVC diameter <2.1 cm collapsing >50% with sniff  suggests a normal RA pressure  of 3 mmHg.     Pericardium  There is no pericardial effusion.        Rhythm  Sinus rhythm was noted.  _____________________________________________________________________________  __  MMode/2D Measurements & Calculations     IVSd: 1.1 cm  LVIDd: 5.2 cm  LVIDs: 3.4 cm  LVPWd: 1.1 cm  FS: 34.8 %  LV mass(C)d: 227.4 grams  LV mass(C)dI: 95.2 grams/m2  Ao root diam: 4.3 cm  LA dimension: 4.2 cm  asc Aorta Diam: 4.3 cm  LA/Ao: 0.97  LVOT diam: 2.6 cm  LVOT area: 5.3 cm2     EF(MOD-bp): 53.2 %  LA Volume (BP): 88.9 ml  LA Volume Index (BP): 37.2 ml/m2     RWT: 0.44        Doppler Measurements & Calculations  MV E max rachel: 70.3 cm/sec  MV A max rachel: 50.9 cm/sec  MV E/A: 1.4  MV max PG: 3.5 mmHg  MV mean P.8 mmHg  MV V2 VTI: 25.8 cm  MV dec time: 0.25 sec  PA acc time: 0.11 sec  TR max rachel: 280.2 cm/sec  TR max P.4 mmHg  E/E' av.4  Lateral E/e': 8.4  Medial E/e': 10.4              _____________________________________________________________________________  __        Report approved by: Brie Malagon 2021 09:53 AM          Medications     heparin 1,350 Units/hr (21 0340)     - MEDICATION INSTRUCTIONS -       sodium chloride 100 mL/hr at 21 0551       aspirin  81 mg Oral Daily     lisinopril  20 mg Oral Daily     metoprolol tartrate  25 mg Oral BID     rosuvastatin  20 mg Oral Daily     senna-docusate  1 tablet Oral BID    Or     senna-docusate  2 tablet Oral BID     sodium chloride (PF)  3 mL Intracatheter Q8H

## 2021-01-08 NOTE — Clinical Note
Potential access sites were evaluated for patency using ultrasound.   The right femoral artery was selected. Access was obtained under with Sonosite and Fluoroscopic guidance using a micropuncture 21 guage needle with direct visualization of needle entry.

## 2021-01-09 ENCOUNTER — ANESTHESIA (OUTPATIENT)
Dept: SURGERY | Facility: CLINIC | Age: 51
DRG: 235 | End: 2021-01-09
Payer: COMMERCIAL

## 2021-01-09 ENCOUNTER — APPOINTMENT (OUTPATIENT)
Dept: GENERAL RADIOLOGY | Facility: CLINIC | Age: 51
DRG: 235 | End: 2021-01-09
Attending: PHYSICIAN ASSISTANT
Payer: COMMERCIAL

## 2021-01-09 LAB
ABO + RH BLD: NORMAL
ABO + RH BLD: NORMAL
ALBUMIN SERPL-MCNC: 3.3 G/DL (ref 3.4–5)
ALBUMIN SERPL-MCNC: 3.5 G/DL (ref 3.4–5)
ALP SERPL-CCNC: 41 U/L (ref 40–150)
ALP SERPL-CCNC: 53 U/L (ref 40–150)
ALT SERPL W P-5'-P-CCNC: 25 U/L (ref 0–70)
ALT SERPL W P-5'-P-CCNC: 27 U/L (ref 0–70)
ANION GAP SERPL CALCULATED.3IONS-SCNC: 4 MMOL/L (ref 3–14)
ANION GAP SERPL CALCULATED.3IONS-SCNC: 5 MMOL/L (ref 3–14)
ANION GAP SERPL CALCULATED.3IONS-SCNC: 6 MMOL/L (ref 3–14)
APTT PPP: 33 SEC (ref 22–37)
APTT PPP: 47 SEC (ref 22–37)
APTT PPP: 51 SEC (ref 22–37)
AST SERPL W P-5'-P-CCNC: 38 U/L (ref 0–45)
AST SERPL W P-5'-P-CCNC: 40 U/L (ref 0–45)
BASE DEFICIT BLDA-SCNC: 2.5 MMOL/L
BASE DEFICIT BLDV-SCNC: 3 MMOL/L
BILIRUB SERPL-MCNC: 1.1 MG/DL (ref 0.2–1.3)
BILIRUB SERPL-MCNC: 1.5 MG/DL (ref 0.2–1.3)
BLD GP AB SCN SERPL QL: NORMAL
BLD PROD TYP BPU: NORMAL
BLD UNIT ID BPU: 0
BLD UNIT ID BPU: 0
BLOOD BANK CMNT PATIENT-IMP: NORMAL
BLOOD PRODUCT CODE: NORMAL
BLOOD PRODUCT CODE: NORMAL
BPU ID: NORMAL
BPU ID: NORMAL
BUN SERPL-MCNC: 14 MG/DL (ref 7–30)
BUN SERPL-MCNC: 16 MG/DL (ref 7–30)
BUN SERPL-MCNC: 16 MG/DL (ref 7–30)
CA-I BLD-MCNC: 4.9 MG/DL (ref 4.4–5.2)
CALCIUM SERPL-MCNC: 8.1 MG/DL (ref 8.5–10.1)
CALCIUM SERPL-MCNC: 8.4 MG/DL (ref 8.5–10.1)
CALCIUM SERPL-MCNC: 8.5 MG/DL (ref 8.5–10.1)
CHLORIDE SERPL-SCNC: 109 MMOL/L (ref 94–109)
CHLORIDE SERPL-SCNC: 112 MMOL/L (ref 94–109)
CHLORIDE SERPL-SCNC: 113 MMOL/L (ref 94–109)
CO2 SERPL-SCNC: 24 MMOL/L (ref 20–32)
CO2 SERPL-SCNC: 24 MMOL/L (ref 20–32)
CO2 SERPL-SCNC: 27 MMOL/L (ref 20–32)
CREAT SERPL-MCNC: 0.92 MG/DL (ref 0.66–1.25)
CREAT SERPL-MCNC: 0.94 MG/DL (ref 0.66–1.25)
CREAT SERPL-MCNC: 0.97 MG/DL (ref 0.66–1.25)
ERYTHROCYTE [DISTWIDTH] IN BLOOD BY AUTOMATED COUNT: 12.3 % (ref 10–15)
ERYTHROCYTE [DISTWIDTH] IN BLOOD BY AUTOMATED COUNT: 12.3 % (ref 10–15)
ERYTHROCYTE [DISTWIDTH] IN BLOOD BY AUTOMATED COUNT: 12.4 % (ref 10–15)
FIBRINOGEN PPP-MCNC: 273 MG/DL (ref 200–420)
FIBRINOGEN PPP-MCNC: 330 MG/DL (ref 200–420)
GFR SERPL CREATININE-BSD FRML MDRD: 89 ML/MIN/{1.73_M2}
GFR SERPL CREATININE-BSD FRML MDRD: >90 ML/MIN/{1.73_M2}
GFR SERPL CREATININE-BSD FRML MDRD: >90 ML/MIN/{1.73_M2}
GLUCOSE BLDC GLUCOMTR-MCNC: 107 MG/DL (ref 70–99)
GLUCOSE BLDC GLUCOMTR-MCNC: 135 MG/DL (ref 70–99)
GLUCOSE SERPL-MCNC: 106 MG/DL (ref 70–99)
GLUCOSE SERPL-MCNC: 131 MG/DL (ref 70–99)
GLUCOSE SERPL-MCNC: 162 MG/DL (ref 70–99)
HCO3 BLD-SCNC: 24 MMOL/L (ref 21–28)
HCO3 BLDV-SCNC: 24 MMOL/L (ref 21–28)
HCT VFR BLD AUTO: 39.2 % (ref 40–53)
HCT VFR BLD AUTO: 42.3 % (ref 40–53)
HCT VFR BLD AUTO: 44.7 % (ref 40–53)
HGB BLD-MCNC: 13.1 G/DL (ref 13.3–17.7)
HGB BLD-MCNC: 14.1 G/DL (ref 13.3–17.7)
HGB BLD-MCNC: 15 G/DL (ref 13.3–17.7)
INR PPP: 1.12 (ref 0.86–1.14)
INR PPP: 1.14 (ref 0.86–1.14)
INR PPP: 1.3 (ref 0.86–1.14)
LACTATE BLD-SCNC: 1.5 MMOL/L (ref 0.7–2)
MAGNESIUM SERPL-MCNC: 2.4 MG/DL (ref 1.6–2.3)
MAGNESIUM SERPL-MCNC: 2.7 MG/DL (ref 1.6–2.3)
MCH RBC QN AUTO: 29.4 PG (ref 26.5–33)
MCH RBC QN AUTO: 29.6 PG (ref 26.5–33)
MCH RBC QN AUTO: 29.8 PG (ref 26.5–33)
MCHC RBC AUTO-ENTMCNC: 33.3 G/DL (ref 31.5–36.5)
MCHC RBC AUTO-ENTMCNC: 33.4 G/DL (ref 31.5–36.5)
MCHC RBC AUTO-ENTMCNC: 33.6 G/DL (ref 31.5–36.5)
MCV RBC AUTO: 88 FL (ref 78–100)
MCV RBC AUTO: 89 FL (ref 78–100)
MCV RBC AUTO: 89 FL (ref 78–100)
NUM BPU REQUESTED: 4
OXYHGB MFR BLD: 90 % (ref 92–100)
OXYHGB MFR BLDV: 58 %
PCO2 BLD: 47 MM HG (ref 35–45)
PCO2 BLDV: 49 MM HG (ref 40–50)
PH BLD: 7.32 PH (ref 7.35–7.45)
PH BLDV: 7.3 PH (ref 7.32–7.43)
PHOSPHATE SERPL-MCNC: 3.2 MG/DL (ref 2.5–4.5)
PLATELET # BLD AUTO: 226 10E9/L (ref 150–450)
PLATELET # BLD AUTO: 249 10E9/L (ref 150–450)
PLATELET # BLD AUTO: 280 10E9/L (ref 150–450)
PO2 BLD: 66 MM HG (ref 80–105)
PO2 BLDV: 33 MM HG (ref 25–47)
POTASSIUM SERPL-SCNC: 4.2 MMOL/L (ref 3.4–5.3)
POTASSIUM SERPL-SCNC: 4.3 MMOL/L (ref 3.4–5.3)
POTASSIUM SERPL-SCNC: 4.6 MMOL/L (ref 3.4–5.3)
PROT SERPL-MCNC: 5.6 G/DL (ref 6.8–8.8)
PROT SERPL-MCNC: 5.9 G/DL (ref 6.8–8.8)
RBC # BLD AUTO: 4.46 10E12/L (ref 4.4–5.9)
RBC # BLD AUTO: 4.76 10E12/L (ref 4.4–5.9)
RBC # BLD AUTO: 5.03 10E12/L (ref 4.4–5.9)
SODIUM SERPL-SCNC: 140 MMOL/L (ref 133–144)
SODIUM SERPL-SCNC: 141 MMOL/L (ref 133–144)
SODIUM SERPL-SCNC: 143 MMOL/L (ref 133–144)
SPECIMEN EXP DATE BLD: NORMAL
TRANSFUSION STATUS PATIENT QL: NORMAL
UFH PPP CHRO-ACNC: 0.19 IU/ML
WBC # BLD AUTO: 20.1 10E9/L (ref 4–11)
WBC # BLD AUTO: 21.7 10E9/L (ref 4–11)
WBC # BLD AUTO: 25.2 10E9/L (ref 4–11)

## 2021-01-09 PROCEDURE — 85384 FIBRINOGEN ACTIVITY: CPT

## 2021-01-09 PROCEDURE — 250N000009 HC RX 250

## 2021-01-09 PROCEDURE — 35600 OPEN HRV UXTR ART 1 SGM CAB: CPT | Performed by: PHYSICIAN ASSISTANT

## 2021-01-09 PROCEDURE — 99152 MOD SED SAME PHYS/QHP 5/>YRS: CPT | Mod: 59 | Performed by: INTERNAL MEDICINE

## 2021-01-09 PROCEDURE — 94003 VENT MGMT INPAT SUBQ DAY: CPT | Mod: ZZRT

## 2021-01-09 PROCEDURE — 94002 VENT MGMT INPAT INIT DAY: CPT

## 2021-01-09 PROCEDURE — 85610 PROTHROMBIN TIME: CPT | Performed by: PHYSICIAN ASSISTANT

## 2021-01-09 PROCEDURE — 250N000013 HC RX MED GY IP 250 OP 250 PS 637: Performed by: SURGERY

## 2021-01-09 PROCEDURE — 33967 INSERT I-AORT PERCUT DEVICE: CPT | Mod: GC | Performed by: INTERNAL MEDICINE

## 2021-01-09 PROCEDURE — 02HP32Z INSERTION OF MONITORING DEVICE INTO PULMONARY TRUNK, PERCUTANEOUS APPROACH: ICD-10-PCS | Performed by: SURGERY

## 2021-01-09 PROCEDURE — 999N001017 HC STATISTIC GLUCOSE BY METER IP

## 2021-01-09 PROCEDURE — 02100AC BYPASS CORONARY ARTERY, ONE ARTERY FROM THORACIC ARTERY WITH AUTOLOGOUS ARTERIAL TISSUE, OPEN APPROACH: ICD-10-PCS | Performed by: SURGERY

## 2021-01-09 PROCEDURE — 03BC4ZZ EXCISION OF LEFT RADIAL ARTERY, PERCUTANEOUS ENDOSCOPIC APPROACH: ICD-10-PCS | Performed by: SURGERY

## 2021-01-09 PROCEDURE — 33534 CABG ARTERIAL TWO: CPT | Performed by: SURGERY

## 2021-01-09 PROCEDURE — 250N000009 HC RX 250: Performed by: NURSE ANESTHETIST, CERTIFIED REGISTERED

## 2021-01-09 PROCEDURE — 250N000013 HC RX MED GY IP 250 OP 250 PS 637

## 2021-01-09 PROCEDURE — 82805 BLOOD GASES W/O2 SATURATION: CPT | Performed by: PHYSICIAN ASSISTANT

## 2021-01-09 PROCEDURE — 93005 ELECTROCARDIOGRAM TRACING: CPT

## 2021-01-09 PROCEDURE — 258N000003 HC RX IP 258 OP 636: Performed by: NURSE ANESTHETIST, CERTIFIED REGISTERED

## 2021-01-09 PROCEDURE — 250N000024 HC ISOFLURANE, PER MIN: Performed by: SURGERY

## 2021-01-09 PROCEDURE — P9041 ALBUMIN (HUMAN),5%, 50ML: HCPCS

## 2021-01-09 PROCEDURE — 250N000011 HC RX IP 250 OP 636: Performed by: PHYSICIAN ASSISTANT

## 2021-01-09 PROCEDURE — 86901 BLOOD TYPING SEROLOGIC RH(D): CPT | Performed by: PHYSICIAN ASSISTANT

## 2021-01-09 PROCEDURE — 99291 CRITICAL CARE FIRST HOUR: CPT

## 2021-01-09 PROCEDURE — 85610 PROTHROMBIN TIME: CPT

## 2021-01-09 PROCEDURE — 99207 PR NO CHARGE LOS: CPT | Performed by: HOSPITALIST

## 2021-01-09 PROCEDURE — 5A02210 ASSISTANCE WITH CARDIAC OUTPUT USING BALLOON PUMP, CONTINUOUS: ICD-10-PCS | Performed by: INTERNAL MEDICINE

## 2021-01-09 PROCEDURE — 83735 ASSAY OF MAGNESIUM: CPT | Performed by: SURGERY

## 2021-01-09 PROCEDURE — 84132 ASSAY OF SERUM POTASSIUM: CPT | Performed by: INTERNAL MEDICINE

## 2021-01-09 PROCEDURE — 84295 ASSAY OF SERUM SODIUM: CPT | Performed by: INTERNAL MEDICINE

## 2021-01-09 PROCEDURE — P9041 ALBUMIN (HUMAN),5%, 50ML: HCPCS | Performed by: PHYSICIAN ASSISTANT

## 2021-01-09 PROCEDURE — 94640 AIRWAY INHALATION TREATMENT: CPT | Mod: 76,ZZRT

## 2021-01-09 PROCEDURE — 85027 COMPLETE CBC AUTOMATED: CPT

## 2021-01-09 PROCEDURE — 82810 BLOOD GASES O2 SAT ONLY: CPT | Performed by: INTERNAL MEDICINE

## 2021-01-09 PROCEDURE — 85610 PROTHROMBIN TIME: CPT | Performed by: INTERNAL MEDICINE

## 2021-01-09 PROCEDURE — 200N000001 HC R&B ICU

## 2021-01-09 PROCEDURE — 410N000006: Performed by: SURGERY

## 2021-01-09 PROCEDURE — C9113 INJ PANTOPRAZOLE SODIUM, VIA: HCPCS | Performed by: PHYSICIAN ASSISTANT

## 2021-01-09 PROCEDURE — 83735 ASSAY OF MAGNESIUM: CPT | Performed by: PHYSICIAN ASSISTANT

## 2021-01-09 PROCEDURE — 258N000003 HC RX IP 258 OP 636: Performed by: PHYSICIAN ASSISTANT

## 2021-01-09 PROCEDURE — 82330 ASSAY OF CALCIUM: CPT | Performed by: INTERNAL MEDICINE

## 2021-01-09 PROCEDURE — 250N000026 HC DESFLURANE, PER MIN: Performed by: SURGERY

## 2021-01-09 PROCEDURE — 258N000003 HC RX IP 258 OP 636

## 2021-01-09 PROCEDURE — 85520 HEPARIN ASSAY: CPT | Performed by: PHYSICIAN ASSISTANT

## 2021-01-09 PROCEDURE — 80053 COMPREHEN METABOLIC PANEL: CPT | Performed by: PHYSICIAN ASSISTANT

## 2021-01-09 PROCEDURE — 33534 CABG ARTERIAL TWO: CPT | Mod: AS | Performed by: PHYSICIAN ASSISTANT

## 2021-01-09 PROCEDURE — 250N000011 HC RX IP 250 OP 636: Performed by: NURSE ANESTHETIST, CERTIFIED REGISTERED

## 2021-01-09 PROCEDURE — 999N000009 HC STATISTIC AIRWAY CARE: Mod: ZZRT

## 2021-01-09 PROCEDURE — 99152 MOD SED SAME PHYS/QHP 5/>YRS: CPT | Performed by: INTERNAL MEDICINE

## 2021-01-09 PROCEDURE — 33967 INSERT I-AORT PERCUT DEVICE: CPT | Performed by: INTERNAL MEDICINE

## 2021-01-09 PROCEDURE — 83605 ASSAY OF LACTIC ACID: CPT | Performed by: INTERNAL MEDICINE

## 2021-01-09 PROCEDURE — 85027 COMPLETE CBC AUTOMATED: CPT | Performed by: INTERNAL MEDICINE

## 2021-01-09 PROCEDURE — 250N000011 HC RX IP 250 OP 636

## 2021-01-09 PROCEDURE — 85027 COMPLETE CBC AUTOMATED: CPT | Performed by: PHYSICIAN ASSISTANT

## 2021-01-09 PROCEDURE — 86923 COMPATIBILITY TEST ELECTRIC: CPT | Performed by: PHYSICIAN ASSISTANT

## 2021-01-09 PROCEDURE — 82803 BLOOD GASES ANY COMBINATION: CPT | Performed by: INTERNAL MEDICINE

## 2021-01-09 PROCEDURE — 85730 THROMBOPLASTIN TIME PARTIAL: CPT | Performed by: INTERNAL MEDICINE

## 2021-01-09 PROCEDURE — 86900 BLOOD TYPING SEROLOGIC ABO: CPT | Performed by: PHYSICIAN ASSISTANT

## 2021-01-09 PROCEDURE — C1725 CATH, TRANSLUMIN NON-LASER: HCPCS | Performed by: INTERNAL MEDICINE

## 2021-01-09 PROCEDURE — 360N000079 HC SURGERY LEVEL 6, PER MIN: Performed by: SURGERY

## 2021-01-09 PROCEDURE — 250N000009 HC RX 250: Performed by: SURGERY

## 2021-01-09 PROCEDURE — 85730 THROMBOPLASTIN TIME PARTIAL: CPT

## 2021-01-09 PROCEDURE — 410N000005 HC PER-PERFUSION, SH ONLY, 1ST 30 MIN: Performed by: SURGERY

## 2021-01-09 PROCEDURE — 83605 ASSAY OF LACTIC ACID: CPT | Performed by: PHYSICIAN ASSISTANT

## 2021-01-09 PROCEDURE — 80053 COMPREHEN METABOLIC PANEL: CPT | Performed by: SURGERY

## 2021-01-09 PROCEDURE — 999N000063 XR CHEST PORT 1 VW

## 2021-01-09 PROCEDURE — 250N000013 HC RX MED GY IP 250 OP 250 PS 637: Performed by: PHYSICIAN ASSISTANT

## 2021-01-09 PROCEDURE — 84100 ASSAY OF PHOSPHORUS: CPT | Performed by: PHYSICIAN ASSISTANT

## 2021-01-09 PROCEDURE — 370N000017 HC ANESTHESIA TECHNICAL FEE, PER MIN: Performed by: SURGERY

## 2021-01-09 PROCEDURE — 85730 THROMBOPLASTIN TIME PARTIAL: CPT | Performed by: PHYSICIAN ASSISTANT

## 2021-01-09 PROCEDURE — 82330 ASSAY OF CALCIUM: CPT | Performed by: PHYSICIAN ASSISTANT

## 2021-01-09 PROCEDURE — 94640 AIRWAY INHALATION TREATMENT: CPT | Mod: 76

## 2021-01-09 PROCEDURE — 250N000009 HC RX 250: Performed by: INTERNAL MEDICINE

## 2021-01-09 PROCEDURE — 272N000001 HC OR GENERAL SUPPLY STERILE: Performed by: SURGERY

## 2021-01-09 PROCEDURE — 80048 BASIC METABOLIC PNL TOTAL CA: CPT | Performed by: INTERNAL MEDICINE

## 2021-01-09 PROCEDURE — 250N000012 HC RX MED GY IP 250 OP 636 PS 637: Performed by: SURGERY

## 2021-01-09 PROCEDURE — 258N000003 HC RX IP 258 OP 636: Performed by: SURGERY

## 2021-01-09 PROCEDURE — 250N000011 HC RX IP 250 OP 636: Performed by: INTERNAL MEDICINE

## 2021-01-09 PROCEDURE — 02100Z9 BYPASS CORONARY ARTERY, ONE ARTERY FROM LEFT INTERNAL MAMMARY, OPEN APPROACH: ICD-10-PCS | Performed by: SURGERY

## 2021-01-09 PROCEDURE — 85384 FIBRINOGEN ACTIVITY: CPT | Performed by: INTERNAL MEDICINE

## 2021-01-09 PROCEDURE — 250N000013 HC RX MED GY IP 250 OP 250 PS 637: Performed by: NURSE ANESTHETIST, CERTIFIED REGISTERED

## 2021-01-09 PROCEDURE — 999N000157 HC STATISTIC RCP TIME EA 10 MIN

## 2021-01-09 PROCEDURE — 250N000011 HC RX IP 250 OP 636: Performed by: SURGERY

## 2021-01-09 PROCEDURE — 5A1221Z PERFORMANCE OF CARDIAC OUTPUT, CONTINUOUS: ICD-10-PCS | Performed by: SURGERY

## 2021-01-09 PROCEDURE — 86850 RBC ANTIBODY SCREEN: CPT | Performed by: PHYSICIAN ASSISTANT

## 2021-01-09 PROCEDURE — 272N000001 HC OR GENERAL SUPPLY STERILE: Performed by: INTERNAL MEDICINE

## 2021-01-09 PROCEDURE — 82947 ASSAY GLUCOSE BLOOD QUANT: CPT | Performed by: INTERNAL MEDICINE

## 2021-01-09 PROCEDURE — P9016 RBC LEUKOCYTES REDUCED: HCPCS | Performed by: PHYSICIAN ASSISTANT

## 2021-01-09 PROCEDURE — 93010 ELECTROCARDIOGRAM REPORT: CPT | Mod: 76 | Performed by: INTERNAL MEDICINE

## 2021-01-09 RX ORDER — OXYCODONE HYDROCHLORIDE 5 MG/1
5-10 TABLET ORAL
Status: DISCONTINUED | OUTPATIENT
Start: 2021-01-09 | End: 2021-01-14 | Stop reason: HOSPADM

## 2021-01-09 RX ORDER — PROPOFOL 10 MG/ML
5-75 INJECTION, EMULSION INTRAVENOUS CONTINUOUS
Status: DISCONTINUED | OUTPATIENT
Start: 2021-01-09 | End: 2021-01-11

## 2021-01-09 RX ORDER — HYDROXYZINE HYDROCHLORIDE 25 MG/1
25 TABLET, FILM COATED ORAL EVERY 6 HOURS PRN
Status: DISCONTINUED | OUTPATIENT
Start: 2021-01-09 | End: 2021-01-14 | Stop reason: HOSPADM

## 2021-01-09 RX ORDER — CHLORHEXIDINE GLUCONATE ORAL RINSE 1.2 MG/ML
15 SOLUTION DENTAL EVERY 12 HOURS
Status: DISCONTINUED | OUTPATIENT
Start: 2021-01-09 | End: 2021-01-11

## 2021-01-09 RX ORDER — HYDROMORPHONE HYDROCHLORIDE 1 MG/ML
1 INJECTION, SOLUTION INTRAMUSCULAR; INTRAVENOUS; SUBCUTANEOUS ONCE
Status: DISCONTINUED | OUTPATIENT
Start: 2021-01-09 | End: 2021-01-14 | Stop reason: HOSPADM

## 2021-01-09 RX ORDER — EPINEPHRINE IN 0.9 % SOD CHLOR 5 MG/250ML
.01-.1 PLASTIC BAG, INJECTION (ML) INTRAVENOUS CONTINUOUS
Status: DISCONTINUED | OUTPATIENT
Start: 2021-01-09 | End: 2021-01-11

## 2021-01-09 RX ORDER — IPRATROPIUM BROMIDE AND ALBUTEROL SULFATE 2.5; .5 MG/3ML; MG/3ML
3 SOLUTION RESPIRATORY (INHALATION) EVERY 4 HOURS
Status: DISCONTINUED | OUTPATIENT
Start: 2021-01-09 | End: 2021-01-11

## 2021-01-09 RX ORDER — DEXTROSE MONOHYDRATE 25 G/50ML
25-50 INJECTION, SOLUTION INTRAVENOUS
Status: DISCONTINUED | OUTPATIENT
Start: 2021-01-09 | End: 2021-01-14 | Stop reason: HOSPADM

## 2021-01-09 RX ORDER — PROTAMINE SULFATE 10 MG/ML
INJECTION, SOLUTION INTRAVENOUS
Status: COMPLETED
Start: 2021-01-09 | End: 2021-01-09

## 2021-01-09 RX ORDER — ALBUMIN, HUMAN INJ 5% 5 %
500-1000 SOLUTION INTRAVENOUS
Status: COMPLETED | OUTPATIENT
Start: 2021-01-09 | End: 2021-01-09

## 2021-01-09 RX ORDER — PROTAMINE SULFATE 10 MG/ML
50 INJECTION, SOLUTION INTRAVENOUS ONCE
Status: COMPLETED | OUTPATIENT
Start: 2021-01-09 | End: 2021-01-09

## 2021-01-09 RX ORDER — HYDROMORPHONE HYDROCHLORIDE 1 MG/ML
.3-.5 INJECTION, SOLUTION INTRAMUSCULAR; INTRAVENOUS; SUBCUTANEOUS
Status: DISCONTINUED | OUTPATIENT
Start: 2021-01-09 | End: 2021-01-14 | Stop reason: HOSPADM

## 2021-01-09 RX ORDER — SODIUM CHLORIDE 9 MG/ML
INJECTION, SOLUTION INTRAVENOUS CONTINUOUS PRN
Status: DISCONTINUED | OUTPATIENT
Start: 2021-01-09 | End: 2021-01-09

## 2021-01-09 RX ORDER — LIDOCAINE 40 MG/G
CREAM TOPICAL
Status: DISCONTINUED | OUTPATIENT
Start: 2021-01-09 | End: 2021-01-09

## 2021-01-09 RX ORDER — SODIUM CHLORIDE, SODIUM LACTATE, POTASSIUM CHLORIDE, CALCIUM CHLORIDE 600; 310; 30; 20 MG/100ML; MG/100ML; MG/100ML; MG/100ML
INJECTION, SOLUTION INTRAVENOUS CONTINUOUS PRN
Status: DISCONTINUED | OUTPATIENT
Start: 2021-01-09 | End: 2021-01-09

## 2021-01-09 RX ORDER — LORAZEPAM 0.5 MG/1
0.5 TABLET ORAL
Status: DISCONTINUED | OUTPATIENT
Start: 2021-01-09 | End: 2021-01-09 | Stop reason: HOSPADM

## 2021-01-09 RX ORDER — CEFAZOLIN SODIUM 2 G/100ML
2 INJECTION, SOLUTION INTRAVENOUS EVERY 8 HOURS
Status: DISCONTINUED | OUTPATIENT
Start: 2021-01-09 | End: 2021-01-10

## 2021-01-09 RX ORDER — PROPOFOL 10 MG/ML
INJECTION, EMULSION INTRAVENOUS PRN
Status: DISCONTINUED | OUTPATIENT
Start: 2021-01-09 | End: 2021-01-09

## 2021-01-09 RX ORDER — SODIUM CHLORIDE 9 MG/ML
INJECTION, SOLUTION INTRAVENOUS CONTINUOUS
Status: DISCONTINUED | OUTPATIENT
Start: 2021-01-09 | End: 2021-01-09 | Stop reason: HOSPADM

## 2021-01-09 RX ORDER — PHENYLEPHRINE HCL IN 0.9% NACL 50MG/250ML
.5-2 PLASTIC BAG, INJECTION (ML) INTRAVENOUS CONTINUOUS
Status: DISCONTINUED | OUTPATIENT
Start: 2021-01-09 | End: 2021-01-11

## 2021-01-09 RX ORDER — ACETAMINOPHEN 325 MG/1
975 TABLET ORAL EVERY 8 HOURS
Status: COMPLETED | OUTPATIENT
Start: 2021-01-09 | End: 2021-01-12

## 2021-01-09 RX ORDER — POTASSIUM CHLORIDE 1500 MG/1
20 TABLET, EXTENDED RELEASE ORAL
Status: DISCONTINUED | OUTPATIENT
Start: 2021-01-09 | End: 2021-01-09 | Stop reason: HOSPADM

## 2021-01-09 RX ORDER — AMOXICILLIN 250 MG
2 CAPSULE ORAL 2 TIMES DAILY
Status: DISCONTINUED | OUTPATIENT
Start: 2021-01-09 | End: 2021-01-14 | Stop reason: HOSPADM

## 2021-01-09 RX ORDER — FENTANYL CITRATE 50 UG/ML
INJECTION, SOLUTION INTRAMUSCULAR; INTRAVENOUS PRN
Status: DISCONTINUED | OUTPATIENT
Start: 2021-01-09 | End: 2021-01-09

## 2021-01-09 RX ORDER — MUPIROCIN 20 MG/G
0.5 OINTMENT TOPICAL 2 TIMES DAILY
Status: DISCONTINUED | OUTPATIENT
Start: 2021-01-09 | End: 2021-01-09 | Stop reason: CLARIF

## 2021-01-09 RX ORDER — LIDOCAINE HYDROCHLORIDE 20 MG/ML
INJECTION, SOLUTION INFILTRATION; PERINEURAL PRN
Status: DISCONTINUED | OUTPATIENT
Start: 2021-01-09 | End: 2021-01-09

## 2021-01-09 RX ORDER — HYDRALAZINE HYDROCHLORIDE 20 MG/ML
10 INJECTION INTRAMUSCULAR; INTRAVENOUS EVERY 30 MIN PRN
Status: DISCONTINUED | OUTPATIENT
Start: 2021-01-09 | End: 2021-01-14 | Stop reason: HOSPADM

## 2021-01-09 RX ORDER — PROTAMINE SULFATE 10 MG/ML
INJECTION, SOLUTION INTRAVENOUS PRN
Status: DISCONTINUED | OUTPATIENT
Start: 2021-01-09 | End: 2021-01-09

## 2021-01-09 RX ORDER — METHOCARBAMOL 750 MG/1
750 TABLET, FILM COATED ORAL 4 TIMES DAILY PRN
Status: DISCONTINUED | OUTPATIENT
Start: 2021-01-09 | End: 2021-01-14 | Stop reason: HOSPADM

## 2021-01-09 RX ORDER — ONDANSETRON 4 MG/1
4 TABLET, ORALLY DISINTEGRATING ORAL EVERY 6 HOURS PRN
Status: DISCONTINUED | OUTPATIENT
Start: 2021-01-09 | End: 2021-01-14 | Stop reason: HOSPADM

## 2021-01-09 RX ORDER — NALOXONE HYDROCHLORIDE 0.4 MG/ML
0.4 INJECTION, SOLUTION INTRAMUSCULAR; INTRAVENOUS; SUBCUTANEOUS
Status: DISCONTINUED | OUTPATIENT
Start: 2021-01-09 | End: 2021-01-14 | Stop reason: HOSPADM

## 2021-01-09 RX ORDER — SODIUM CHLORIDE 9 MG/ML
INJECTION, SOLUTION INTRAVENOUS CONTINUOUS
Status: DISCONTINUED | OUTPATIENT
Start: 2021-01-09 | End: 2021-01-11

## 2021-01-09 RX ORDER — DEXTROSE MONOHYDRATE, SODIUM CHLORIDE, AND POTASSIUM CHLORIDE 50; 1.49; 4.5 G/1000ML; G/1000ML; G/1000ML
INJECTION, SOLUTION INTRAVENOUS CONTINUOUS
Status: DISCONTINUED | OUTPATIENT
Start: 2021-01-09 | End: 2021-01-11

## 2021-01-09 RX ORDER — MEPERIDINE HYDROCHLORIDE 25 MG/ML
12.5-25 INJECTION INTRAMUSCULAR; INTRAVENOUS; SUBCUTANEOUS
Status: DISCONTINUED | OUTPATIENT
Start: 2021-01-09 | End: 2021-01-11

## 2021-01-09 RX ORDER — ALBUTEROL SULFATE 90 UG/1
AEROSOL, METERED RESPIRATORY (INHALATION) PRN
Status: DISCONTINUED | OUTPATIENT
Start: 2021-01-09 | End: 2021-01-09

## 2021-01-09 RX ORDER — NITROGLYCERIN 20 MG/100ML
10-200 INJECTION INTRAVENOUS CONTINUOUS
Status: DISCONTINUED | OUTPATIENT
Start: 2021-01-09 | End: 2021-01-11

## 2021-01-09 RX ORDER — LORAZEPAM 2 MG/ML
0.5 INJECTION INTRAMUSCULAR
Status: DISCONTINUED | OUTPATIENT
Start: 2021-01-09 | End: 2021-01-09 | Stop reason: HOSPADM

## 2021-01-09 RX ORDER — ACETAMINOPHEN 325 MG/1
650 TABLET ORAL EVERY 4 HOURS PRN
Status: DISCONTINUED | OUTPATIENT
Start: 2021-01-12 | End: 2021-01-14 | Stop reason: HOSPADM

## 2021-01-09 RX ORDER — AMOXICILLIN 250 MG
1 CAPSULE ORAL 2 TIMES DAILY
Status: DISCONTINUED | OUTPATIENT
Start: 2021-01-09 | End: 2021-01-14 | Stop reason: HOSPADM

## 2021-01-09 RX ORDER — NICARDIPINE HYDROCHLORIDE 2.5 MG/ML
25 INJECTION INTRAVENOUS ONCE
Status: DISCONTINUED | OUTPATIENT
Start: 2021-01-09 | End: 2021-01-09

## 2021-01-09 RX ORDER — ONDANSETRON 2 MG/ML
4 INJECTION INTRAMUSCULAR; INTRAVENOUS EVERY 6 HOURS PRN
Status: DISCONTINUED | OUTPATIENT
Start: 2021-01-09 | End: 2021-01-14 | Stop reason: HOSPADM

## 2021-01-09 RX ORDER — HEPARIN SODIUM 1000 [USP'U]/ML
INJECTION, SOLUTION INTRAVENOUS; SUBCUTANEOUS PRN
Status: DISCONTINUED | OUTPATIENT
Start: 2021-01-09 | End: 2021-01-09

## 2021-01-09 RX ORDER — DEXTROSE MONOHYDRATE 100 MG/ML
INJECTION, SOLUTION INTRAVENOUS CONTINUOUS PRN
Status: DISCONTINUED | OUTPATIENT
Start: 2021-01-09 | End: 2021-01-11

## 2021-01-09 RX ORDER — PROPOFOL 10 MG/ML
INJECTION, EMULSION INTRAVENOUS CONTINUOUS PRN
Status: DISCONTINUED | OUTPATIENT
Start: 2021-01-09 | End: 2021-01-09

## 2021-01-09 RX ORDER — NALOXONE HYDROCHLORIDE 0.4 MG/ML
0.2 INJECTION, SOLUTION INTRAMUSCULAR; INTRAVENOUS; SUBCUTANEOUS
Status: DISCONTINUED | OUTPATIENT
Start: 2021-01-09 | End: 2021-01-14 | Stop reason: HOSPADM

## 2021-01-09 RX ORDER — LIDOCAINE 40 MG/G
CREAM TOPICAL
Status: DISCONTINUED | OUTPATIENT
Start: 2021-01-09 | End: 2021-01-14 | Stop reason: HOSPADM

## 2021-01-09 RX ORDER — NICOTINE POLACRILEX 4 MG
15-30 LOZENGE BUCCAL
Status: DISCONTINUED | OUTPATIENT
Start: 2021-01-09 | End: 2021-01-14 | Stop reason: HOSPADM

## 2021-01-09 RX ORDER — LIDOCAINE 4 G/G
1-2 PATCH TOPICAL
Status: DISCONTINUED | OUTPATIENT
Start: 2021-01-10 | End: 2021-01-14 | Stop reason: HOSPADM

## 2021-01-09 RX ORDER — SODIUM CHLORIDE, SODIUM LACTATE, POTASSIUM CHLORIDE, CALCIUM CHLORIDE 600; 310; 30; 20 MG/100ML; MG/100ML; MG/100ML; MG/100ML
INJECTION, SOLUTION INTRAVENOUS CONTINUOUS
Status: DISCONTINUED | OUTPATIENT
Start: 2021-01-09 | End: 2021-01-09

## 2021-01-09 RX ORDER — CEFAZOLIN SODIUM 1 G/3ML
INJECTION, POWDER, FOR SOLUTION INTRAMUSCULAR; INTRAVENOUS PRN
Status: DISCONTINUED | OUTPATIENT
Start: 2021-01-09 | End: 2021-01-09

## 2021-01-09 RX ORDER — VECURONIUM BROMIDE 1 MG/ML
INJECTION, POWDER, LYOPHILIZED, FOR SOLUTION INTRAVENOUS PRN
Status: DISCONTINUED | OUTPATIENT
Start: 2021-01-09 | End: 2021-01-09

## 2021-01-09 RX ORDER — FENTANYL CITRATE 50 UG/ML
INJECTION, SOLUTION INTRAMUSCULAR; INTRAVENOUS
Status: DISCONTINUED | OUTPATIENT
Start: 2021-01-09 | End: 2021-01-09 | Stop reason: HOSPADM

## 2021-01-09 RX ORDER — EPHEDRINE SULFATE 50 MG/ML
INJECTION, SOLUTION INTRAMUSCULAR; INTRAVENOUS; SUBCUTANEOUS PRN
Status: DISCONTINUED | OUTPATIENT
Start: 2021-01-09 | End: 2021-01-09

## 2021-01-09 RX ORDER — NICARDIPINE HYDROCHLORIDE 0.2 MG/ML
0.5 INJECTION INTRAVENOUS CONTINUOUS
Status: DISCONTINUED | OUTPATIENT
Start: 2021-01-09 | End: 2021-01-11

## 2021-01-09 RX ADMIN — VECURONIUM BROMIDE 5 MG: 1 INJECTION, POWDER, LYOPHILIZED, FOR SOLUTION INTRAVENOUS at 10:47

## 2021-01-09 RX ADMIN — IPRATROPIUM BROMIDE AND ALBUTEROL SULFATE 3 ML: .5; 3 SOLUTION RESPIRATORY (INHALATION) at 17:14

## 2021-01-09 RX ADMIN — PANTOPRAZOLE SODIUM 40 MG: 40 INJECTION, POWDER, FOR SOLUTION INTRAVENOUS at 17:20

## 2021-01-09 RX ADMIN — SODIUM CHLORIDE: 9 INJECTION, SOLUTION INTRAVENOUS at 08:00

## 2021-01-09 RX ADMIN — IPRATROPIUM BROMIDE AND ALBUTEROL SULFATE 3 ML: .5; 3 SOLUTION RESPIRATORY (INHALATION) at 22:19

## 2021-01-09 RX ADMIN — VECURONIUM BROMIDE 5 MG: 1 INJECTION, POWDER, LYOPHILIZED, FOR SOLUTION INTRAVENOUS at 12:40

## 2021-01-09 RX ADMIN — FAMOTIDINE 20 MG: 20 TABLET ORAL at 06:34

## 2021-01-09 RX ADMIN — SODIUM CHLORIDE, POTASSIUM CHLORIDE, SODIUM LACTATE AND CALCIUM CHLORIDE: 600; 310; 30; 20 INJECTION, SOLUTION INTRAVENOUS at 09:39

## 2021-01-09 RX ADMIN — PHENYLEPHRINE HYDROCHLORIDE 100 MCG: 10 INJECTION INTRAVENOUS at 09:38

## 2021-01-09 RX ADMIN — CEFAZOLIN SODIUM 2 G: 2 INJECTION, SOLUTION INTRAVENOUS at 22:34

## 2021-01-09 RX ADMIN — PROTAMINE SULFATE 50 MG: 10 INJECTION, SOLUTION INTRAVENOUS at 19:51

## 2021-01-09 RX ADMIN — AMINOCAPROIC ACID 5 G/HR: 250 INJECTION, SOLUTION INTRAVENOUS at 09:55

## 2021-01-09 RX ADMIN — SODIUM CHLORIDE: 9 INJECTION, SOLUTION INTRAVENOUS at 17:38

## 2021-01-09 RX ADMIN — PHENYLEPHRINE HYDROCHLORIDE 100 MCG: 10 INJECTION INTRAVENOUS at 09:37

## 2021-01-09 RX ADMIN — NICARDIPINE HYDROCHLORIDE 0.5 MG/HR: 0.2 INJECTION, SOLUTION INTRAVENOUS at 14:22

## 2021-01-09 RX ADMIN — ROCURONIUM BROMIDE 50 MG: 10 INJECTION INTRAVENOUS at 09:18

## 2021-01-09 RX ADMIN — HEPARIN SODIUM 3000 UNITS: 1000 INJECTION INTRAVENOUS; SUBCUTANEOUS at 10:54

## 2021-01-09 RX ADMIN — VECURONIUM BROMIDE 5 MG: 1 INJECTION, POWDER, LYOPHILIZED, FOR SOLUTION INTRAVENOUS at 11:16

## 2021-01-09 RX ADMIN — DEXMEDETOMIDINE HYDROCHLORIDE 0.3 MCG/KG/HR: 100 INJECTION, SOLUTION INTRAVENOUS at 09:58

## 2021-01-09 RX ADMIN — PHENYLEPHRINE HYDROCHLORIDE 50 MCG: 10 INJECTION INTRAVENOUS at 11:54

## 2021-01-09 RX ADMIN — SODIUM CHLORIDE: 9 INJECTION, SOLUTION INTRAVENOUS at 09:40

## 2021-01-09 RX ADMIN — PHENYLEPHRINE HYDROCHLORIDE 0.25 MCG/KG/MIN: 10 INJECTION INTRAVENOUS at 09:55

## 2021-01-09 RX ADMIN — PHENYLEPHRINE HYDROCHLORIDE 75 MCG: 10 INJECTION INTRAVENOUS at 10:19

## 2021-01-09 RX ADMIN — SODIUM CHLORIDE: 9 INJECTION, SOLUTION INTRAVENOUS at 12:11

## 2021-01-09 RX ADMIN — PHENYLEPHRINE HYDROCHLORIDE 50 MCG: 10 INJECTION INTRAVENOUS at 11:42

## 2021-01-09 RX ADMIN — ASPIRIN 325 MG: 325 TABLET, COATED ORAL at 07:41

## 2021-01-09 RX ADMIN — HEPARIN SODIUM 46000 UNITS: 1000 INJECTION INTRAVENOUS; SUBCUTANEOUS at 12:08

## 2021-01-09 RX ADMIN — PHENYLEPHRINE HYDROCHLORIDE 100 MCG: 10 INJECTION INTRAVENOUS at 10:43

## 2021-01-09 RX ADMIN — PHENYLEPHRINE HYDROCHLORIDE 200 MCG: 10 INJECTION INTRAVENOUS at 15:20

## 2021-01-09 RX ADMIN — MIDAZOLAM HYDROCHLORIDE 1 MG: 1 INJECTION, SOLUTION INTRAMUSCULAR; INTRAVENOUS at 12:32

## 2021-01-09 RX ADMIN — FENTANYL CITRATE 100 MCG: 50 INJECTION, SOLUTION INTRAMUSCULAR; INTRAVENOUS at 10:34

## 2021-01-09 RX ADMIN — SODIUM CHLORIDE, POTASSIUM CHLORIDE, SODIUM LACTATE AND CALCIUM CHLORIDE: 600; 310; 30; 20 INJECTION, SOLUTION INTRAVENOUS at 12:52

## 2021-01-09 RX ADMIN — PHENYLEPHRINE HYDROCHLORIDE 100 MCG: 10 INJECTION INTRAVENOUS at 10:45

## 2021-01-09 RX ADMIN — CHLORHEXIDINE GLUCONATE 0.12% ORAL RINSE 15 ML: 1.2 LIQUID ORAL at 20:20

## 2021-01-09 RX ADMIN — PROTAMINE SULFATE 300 MG: 10 INJECTION, SOLUTION INTRAVENOUS at 14:07

## 2021-01-09 RX ADMIN — PROPOFOL 20 MG: 10 INJECTION, EMULSION INTRAVENOUS at 10:34

## 2021-01-09 RX ADMIN — PHENYLEPHRINE HYDROCHLORIDE 1 MCG/KG/MIN: 10 INJECTION, SOLUTION INTRAVENOUS at 22:58

## 2021-01-09 RX ADMIN — FENTANYL CITRATE 150 MCG: 50 INJECTION, SOLUTION INTRAMUSCULAR; INTRAVENOUS at 10:30

## 2021-01-09 RX ADMIN — HYDROMORPHONE HYDROCHLORIDE 0.5 MG: 1 INJECTION, SOLUTION INTRAMUSCULAR; INTRAVENOUS; SUBCUTANEOUS at 17:47

## 2021-01-09 RX ADMIN — PHENYLEPHRINE HYDROCHLORIDE 75 MCG: 10 INJECTION INTRAVENOUS at 14:03

## 2021-01-09 RX ADMIN — ALBUMIN HUMAN 500 ML: 0.05 INJECTION, SOLUTION INTRAVENOUS at 18:13

## 2021-01-09 RX ADMIN — ACETAMINOPHEN 975 MG: 325 TABLET, FILM COATED ORAL at 20:20

## 2021-01-09 RX ADMIN — PROPOFOL 75 MCG/KG/MIN: 10 INJECTION, EMULSION INTRAVENOUS at 19:26

## 2021-01-09 RX ADMIN — HYDROMORPHONE HYDROCHLORIDE 0.5 MG: 1 INJECTION, SOLUTION INTRAMUSCULAR; INTRAVENOUS; SUBCUTANEOUS at 20:20

## 2021-01-09 RX ADMIN — PHENYLEPHRINE HYDROCHLORIDE 100 MCG: 10 INJECTION INTRAVENOUS at 11:41

## 2021-01-09 RX ADMIN — POTASSIUM CHLORIDE, DEXTROSE MONOHYDRATE AND SODIUM CHLORIDE: 150; 5; 450 INJECTION, SOLUTION INTRAVENOUS at 17:09

## 2021-01-09 RX ADMIN — VECURONIUM BROMIDE 2 MG: 1 INJECTION, POWDER, LYOPHILIZED, FOR SOLUTION INTRAVENOUS at 14:16

## 2021-01-09 RX ADMIN — FENTANYL CITRATE 100 MCG: 50 INJECTION, SOLUTION INTRAMUSCULAR; INTRAVENOUS at 10:32

## 2021-01-09 RX ADMIN — PHENYLEPHRINE HYDROCHLORIDE 50 MCG: 10 INJECTION INTRAVENOUS at 09:28

## 2021-01-09 RX ADMIN — HYDROMORPHONE HYDROCHLORIDE 0.5 MG: 1 INJECTION, SOLUTION INTRAMUSCULAR; INTRAVENOUS; SUBCUTANEOUS at 22:39

## 2021-01-09 RX ADMIN — VECURONIUM BROMIDE 5 MG: 1 INJECTION, POWDER, LYOPHILIZED, FOR SOLUTION INTRAVENOUS at 13:26

## 2021-01-09 RX ADMIN — PHENYLEPHRINE HYDROCHLORIDE 75 MCG: 10 INJECTION INTRAVENOUS at 09:31

## 2021-01-09 RX ADMIN — ALBUTEROL SULFATE 5 PUFF: 90 AEROSOL, METERED RESPIRATORY (INHALATION) at 15:13

## 2021-01-09 RX ADMIN — Medication 5 MG: at 09:38

## 2021-01-09 RX ADMIN — CEFAZOLIN 1 G: 1 INJECTION, POWDER, FOR SOLUTION INTRAMUSCULAR; INTRAVENOUS at 13:55

## 2021-01-09 RX ADMIN — PROPOFOL 65 MCG/KG/MIN: 10 INJECTION, EMULSION INTRAVENOUS at 23:09

## 2021-01-09 RX ADMIN — VECURONIUM BROMIDE 5 MG: 1 INJECTION, POWDER, LYOPHILIZED, FOR SOLUTION INTRAVENOUS at 09:46

## 2021-01-09 RX ADMIN — PHENYLEPHRINE HYDROCHLORIDE 50 MCG: 10 INJECTION INTRAVENOUS at 10:24

## 2021-01-09 RX ADMIN — CEFAZOLIN 1 G: 1 INJECTION, POWDER, FOR SOLUTION INTRAMUSCULAR; INTRAVENOUS at 12:00

## 2021-01-09 RX ADMIN — PHENYLEPHRINE HYDROCHLORIDE 50 MCG: 10 INJECTION INTRAVENOUS at 12:25

## 2021-01-09 RX ADMIN — PHENYLEPHRINE HYDROCHLORIDE 100 MCG: 10 INJECTION INTRAVENOUS at 11:20

## 2021-01-09 RX ADMIN — FENTANYL CITRATE 100 MCG: 50 INJECTION, SOLUTION INTRAMUSCULAR; INTRAVENOUS at 11:01

## 2021-01-09 RX ADMIN — SODIUM CHLORIDE, POTASSIUM CHLORIDE, SODIUM LACTATE AND CALCIUM CHLORIDE: 600; 310; 30; 20 INJECTION, SOLUTION INTRAVENOUS at 09:14

## 2021-01-09 RX ADMIN — PROPOFOL 30 MCG/KG/MIN: 10 INJECTION, EMULSION INTRAVENOUS at 14:39

## 2021-01-09 RX ADMIN — PROPOFOL 75 MCG/KG/MIN: 10 INJECTION, EMULSION INTRAVENOUS at 17:51

## 2021-01-09 RX ADMIN — AMINOCAPROIC ACID 1 G/HR: 250 INJECTION, SOLUTION INTRAVENOUS at 10:56

## 2021-01-09 RX ADMIN — PHENYLEPHRINE HYDROCHLORIDE 100 MCG: 10 INJECTION INTRAVENOUS at 09:59

## 2021-01-09 RX ADMIN — Medication 5 MG: at 11:45

## 2021-01-09 RX ADMIN — VECURONIUM BROMIDE 5 MG: 1 INJECTION, POWDER, LYOPHILIZED, FOR SOLUTION INTRAVENOUS at 11:53

## 2021-01-09 RX ADMIN — PHENYLEPHRINE HYDROCHLORIDE 100 MCG: 10 INJECTION INTRAVENOUS at 09:54

## 2021-01-09 RX ADMIN — PHENYLEPHRINE HYDROCHLORIDE 50 MCG: 10 INJECTION INTRAVENOUS at 11:57

## 2021-01-09 RX ADMIN — PROPOFOL 40 MG: 10 INJECTION, EMULSION INTRAVENOUS at 10:30

## 2021-01-09 RX ADMIN — DEXMEDETOMIDINE HYDROCHLORIDE 0.3 MCG/KG/HR: 100 INJECTION, SOLUTION INTRAVENOUS at 12:41

## 2021-01-09 RX ADMIN — DOCUSATE SODIUM 50 MG AND SENNOSIDES 8.6 MG 1 TABLET: 8.6; 5 TABLET, FILM COATED ORAL at 20:20

## 2021-01-09 RX ADMIN — PHENYLEPHRINE HYDROCHLORIDE 50 MCG: 10 INJECTION INTRAVENOUS at 09:26

## 2021-01-09 RX ADMIN — LIDOCAINE HYDROCHLORIDE 100 MG: 20 INJECTION, SOLUTION INFILTRATION; PERINEURAL at 09:18

## 2021-01-09 RX ADMIN — MIDAZOLAM HYDROCHLORIDE 2 MG: 1 INJECTION, SOLUTION INTRAMUSCULAR; INTRAVENOUS at 09:15

## 2021-01-09 RX ADMIN — MIDAZOLAM HYDROCHLORIDE 7 MG: 1 INJECTION, SOLUTION INTRAMUSCULAR; INTRAVENOUS at 09:18

## 2021-01-09 RX ADMIN — PHENYLEPHRINE HYDROCHLORIDE 50 MCG: 10 INJECTION INTRAVENOUS at 11:51

## 2021-01-09 RX ADMIN — PHENYLEPHRINE HYDROCHLORIDE 0.1 MCG/KG/MIN: 10 INJECTION INTRAVENOUS at 15:00

## 2021-01-09 RX ADMIN — PROPOFOL 20 MG: 10 INJECTION, EMULSION INTRAVENOUS at 10:32

## 2021-01-09 RX ADMIN — PROPOFOL 75 MCG/KG/MIN: 10 INJECTION, EMULSION INTRAVENOUS at 21:08

## 2021-01-09 RX ADMIN — VECURONIUM BROMIDE 5 MG: 1 INJECTION, POWDER, LYOPHILIZED, FOR SOLUTION INTRAVENOUS at 10:25

## 2021-01-09 RX ADMIN — Medication 5 MG: at 09:39

## 2021-01-09 RX ADMIN — FENTANYL CITRATE 250 MCG: 50 INJECTION, SOLUTION INTRAMUSCULAR; INTRAVENOUS at 09:18

## 2021-01-09 RX ADMIN — PHENYLEPHRINE HYDROCHLORIDE 50 MCG: 10 INJECTION INTRAVENOUS at 12:19

## 2021-01-09 RX ADMIN — FENTANYL CITRATE 150 MCG: 50 INJECTION, SOLUTION INTRAMUSCULAR; INTRAVENOUS at 15:12

## 2021-01-09 RX ADMIN — METOPROLOL TARTRATE 25 MG: 25 TABLET, FILM COATED ORAL at 06:32

## 2021-01-09 RX ADMIN — CEFAZOLIN SODIUM 2 G: 2 INJECTION, SOLUTION INTRAVENOUS at 10:10

## 2021-01-09 RX ADMIN — EPINEPHRINE 0.03 MCG/KG/MIN: 1 INJECTION INTRAMUSCULAR; INTRAVENOUS; SUBCUTANEOUS at 13:54

## 2021-01-09 RX ADMIN — HYDROMORPHONE HYDROCHLORIDE 0.5 MG: 1 INJECTION, SOLUTION INTRAMUSCULAR; INTRAVENOUS; SUBCUTANEOUS at 17:48

## 2021-01-09 ASSESSMENT — ACTIVITIES OF DAILY LIVING (ADL)
ADLS_ACUITY_SCORE: 25
ADLS_ACUITY_SCORE: 14

## 2021-01-09 ASSESSMENT — LIFESTYLE VARIABLES: TOBACCO_USE: 0

## 2021-01-09 NOTE — ANESTHESIA CARE TRANSFER NOTE
Patient: Mor Monson    Procedure(s):  CORONARY ARTERY BYPASS GRAFT (CABG), On pump, Fermin, Radial vein graft LIMA-LAD Radial artery graft-OM    Diagnosis: Unknown cause of injury [X58.XXXA]  Diagnosis Additional Information: No value filed.    Anesthesia Type:   No value filed.     Note:  Airway :Ventilator  Patient transferred to:ICU  Comments: Patient connected to SpO@, ECG, arterial blood pressure transport monitors.  Accompanied by CRNA, anesthesiologist,  RN,  Surgeon to ICU room.  Patient is ventialted by ambu with O2 @ 10 LPM during transport.  Pt connected to ICU ventilator on arrival with RT at bedside. ETT position unchanged and breath sounds confirmed per RT.    Report given to ICU RN and questions answered.  Pt on ICU monitors and stable at handoff.ICU Handoff: Call for PAUSE to initiate/utilize ICU HANDOFF, Identified Patient, Identified Responsible Provider, Reviewed the Pertinent Medical History, Discussed Surgical Course, Set Expectations for Post Procedure Period, Allowed Opportunity for Questions and Acknowledgement of Understanding and Reviewed Intra-OP Anesthesia Management and Issues during Anesthesia      Vitals: (Last set prior to Anesthesia Care Transfer)    CRNA VITALS  1/9/2021 1455 - 1/9/2021 1555      1/9/2021             Resp Rate (observed):  18    Resp Rate (set):  20                Electronically Signed By: SHANNAN Michelle CRNA  January 9, 2021  3:58 PM

## 2021-01-09 NOTE — ANESTHESIA POSTPROCEDURE EVALUATION
Patient: Mor Monson    Procedure(s):  CORONARY ARTERY BYPASS GRAFT (CABG), On pump, Fermin, Radial vein graft LIMA-LAD Radial artery graft-OM    Diagnosis:Unknown cause of injury [X58.XXXA]  Diagnosis Additional Information: No value filed.    Anesthesia Type:  No value filed.    Note:  Anesthesia Post Evaluation    Patient location during evaluation: ICU  Patient participation: Unable to evaluate secondary to administered sedation  Pain management: unable to assess  Cardiovascular status: acceptable  Respiratory status: intubated and ventilator  Hydration status: acceptable  PONV: unable to assess     Anesthetic complications: None          Last vitals:  Vitals:    01/09/21 0043 01/09/21 0354 01/09/21 0632   BP: 137/86 (!) 148/99    Pulse: 62 63 69   Resp: 18 18    Temp: 36.9  C (98.4  F) 36.9  C (98.4  F)    SpO2: 99% 100%          Electronically Signed By: Ruslan Lemons MD  January 9, 2021  4:22 PM

## 2021-01-09 NOTE — BRIEF OP NOTE
[facility]    Brief Operative Note    Pre-operative diagnosis: Coronary artery disease  Post-operative diagnosis Coronary artery disease    Procedure: Procedure(s):  CORONARY ARTERY BYPASS GRAFT (CABG), On pump, Fermin, Radial vein graft LIMA-LAD Radial artery graft-OM  Surgeon: Surgeon(s) and Role:     * Jacky Rojas MD - Primary     * Edna Beal PA-C - Assisting  Anesthesia: General   Estimated blood loss: * No values recorded between 1/9/2021 10:30 AM and 1/9/2021  3:05 PM *  Drains: 1 (28F) mediastinal chest tube, 1 (24F) piedad mediastinal, 1 (24F) piedad left pleural   Specimens: * No specimens in log *  Findings:   Coronary artery disease, see full operative report for further findings.  Complications: None.  Implants: * No implants in log *

## 2021-01-09 NOTE — PROGRESS NOTES
[facility]    Medicine Progress Note - Hospitalist Service       Date of Admission:  1/8/2021  Assessment & Plan       Mor Monson is a 50 year old male admitted on 1/8/2021. PMHx obesity, DLD, HTN who was admitted to [facility] on 1/7/2020 with unstable angina found to have severe left main disease. He is transferred to [facility] for CABG evaluation.     Unstable angina assoc with severe left main disease  Heart cath showed an ulcerative severe lesion in distal LMA extending into ostia of LAD and LCx as well as moderate distal RCA disease. TTE showing EF 50-55% and mid-anterolateral and mid-anterior wall with moderate hypokinesis.    HDL 45 .  - Tele, I&Os, daily weights.  - Appreciate CV surgery consult.    - Appreciate cardiology consult.    - ASA, Lopressor, lisinopril, rosuvastatin.   - NPO since midnight.    - Resume heparin drip btwn 6-8p, nursing clarifying with cardiology given hematoma on arrival at femoral access site (seems better now).  -- 1/9/2021 planned for IABP and subsequent CABG with L radial artery harvest (unclear timing if CABG is 1/9 or 1/10)  - plans per CV surgery     Hypertension  Hyperlipidemia  - Statin, ace, and beta blocker initiated.     Impaired fasting glucose  Mild hyperglycemia (102) with fasting labs.  HGB A1c of 5.5%.       Medication noncompliance  Discontinued atorvastatin and amlodipine when he started exercising regularly and saw improved blood pressure.     MDD with anxiety  No longer on any medications.       Obesity, BMI of 35.63   - Cont regular exercise when safe from cardiovascular standpoint. Further mngt per PCP.      Diet: NPO for Medical/Clinical Reasons Except for: Meds  NPO per Anesthesia Guidelines for Procedure/Surgery Except for: Meds    DVT Prophylaxis: Pneumatic Compression Devices and heparin gtt at discretion of CV surgery / cardiology  Cr Catheter: not present  Code Status: Full Code            Disposition Plan   Expected discharge: 3+ days pending surgical plans  Entered: Rajinder Canela MD 01/09/2021, 8:34 AM         Rajinder Canela MD  Hospitalist Service  [facility]  Contact information available via [facility] Paging/Directory    ______________________________________________________________________    Interval History   Chart reviewed. Patient in OR much of the day with IABP, central line, and possibly CABG (today/tmrw?) planned.  Plans per cv surgery and cardiology as of now.  Will attempt to see patient later in day if out of OR.    Data reviewed today: I reviewed all medications, new labs and imaging results over the last 24 hours. I personally reviewed no images or EKG's today.    Physical Exam   Vital Signs: Temp: 98.4  F (36.9  C) Temp src: Oral BP: (!) 148/99 Pulse: 69   Resp: 18 SpO2: 100 % O2 Device: None (Room air)    Weight: 256 lbs 1.6 oz      Data   Recent Labs   Lab 01/09/21  1425 01/08/21  0954 01/07/21  1900 01/07/21  1624 01/07/21  1158   WBC 25.2* 7.3  --   --  6.5   HGB 13.1* 17.2  --   --  17.0   MCV 88 88  --   --  91    264  --   --  252   INR 1.30*  --   --   --   --     137  --   --  138   POTASSIUM 4.6 4.0  --   --  3.6   CHLORIDE 109 108  --   --  107   CO2 27 24  --   --  27   BUN 14 9  --   --  14   CR 0.92 0.64*  --   --  0.77   ANIONGAP 4 5  --   --  4   JOSE ALBERTO 8.4* 9.0  --   --  9.3   * 113*  --   --  102*   ALBUMIN  --  3.9  --   --  4.2   PROTTOTAL  --  7.2  --   --  7.8   BILITOTAL  --  1.2  --   --  1.0   ALKPHOS  --  62  --   --  69   ALT  --  26  --   --  31   AST  --  23  --   --  17   TROPI  --   --  <0.015 <0.015 <0.015     Recent Results (from the past 24 hour(s))   XR Chest 2 Views    Narrative    EXAM: CHEST 2 VIEWS  LOCATION: [facility]  DATE/TIME: 1/8/2021 11:55 PM    INDICATION: Preoperative for coronary artery bypass surgery.    COMPARISON: 1/7/2021 at 1234 hours.    FINDINGS: The lungs are  clear. Normal size cardiac silhouette.      Impression    IMPRESSION: No evidence of active cardiopulmonary disease.    US Upper Extremity Arterial Duplex Left    Narrative    EXAM: US UPPER EXTREMITY ARTERIAL DUPLEX LEFT  LOCATION: [facility]  DATE/TIME: 1/8/2021 11:05 PM    INDICATION: Left arterial mapping for coronary bypass graft.    COMPARISON: None.    TECHNIQUE: Arterial duplex ultrasound of the left upper extremity. Color flow and spectral Doppler with waveform analysis performed.    FINDINGS: Ultrasound includes evaluation of the brachial artery mid upper arm through the radial and ulnar arteries of the wrist.     LEFT BRACHIAL ARTERY  Above antecubital fossa: 8 mm diameter. Velocity 80 cm/s (triphasic).    LEFT RADIAL ARTERY  Proximal forearm: 4 mm diameter.  Mid forearm: 3 mm diameter.  Distal forearm: 3 mm diameter.  At wrist: 4 mm diameter. Velocity 90 cm/s (triphasic).    LEFT ULNAR ARTERY  At wrist: Velocity 76 cm/s (triphasic).      Impression    IMPRESSION: Patent brachial, radial, and ulnar arteries from just above antecubital fossa to wrist as described.     US Carotid Bilateral    Narrative    EXAM: ULTRASOUND CAROTID BILATERAL  LOCATION: [facility]  DATE/TIME: 1/8/2021 11:05 PM    INDICATION: Preoperative.  COMPARISON: None.  TECHNIQUE: Duplex exam performed utilizing 2D gray-scale imaging, Doppler interrogation with color-flow and spectral waveform analysis. The percent diameter stenosis is determined using NASCET criteria and Society of Radiologists in Ultrasound Consensus   Criteria.    FINDINGS:    RIGHT: Mild plaque in the distal common carotid artery. The peak systolic velocity in the ICA is less than 125 cm/sec, consistent with less than 50% stenosis. Normal velocities in the ECA. Antegrade flow within the vertebral artery.     LEFT: The peak systolic velocity in the ICA is less than 125 cm/sec, consistent with less than 50% stenosis. Normal velocities in the  ECA. Antegrade flow within the vertebral artery.    VELOCITY CHART:  CCA   Right: 110 cm/s   Left: 100 cm/s  ICA   Right: 84 cm/s   Left: 75 cm/s  ECA   Right: 129 cm/s   Left: 106 cm/s  ICA/CCA PSV Ratio   Right: 0.8   Left: 0.8      Impression    IMPRESSION: No evidence of significant stenosis within bilateral carotid arteries.   US Lower Extremity Venous Mapping Bilateral    Narrative    FINAL REPORT    [facility]    DATE: 01/09/2021, 8:26 AM    EXAM: ULTRASOUND VENOUS MAPPING STUDY    INDICATION: Preop CABG.    TECHNIQUE: Ultrasound examination of the bilateral lower extremity veins was performed.    VENOUS DIAMETERS    RIGHT LOWER EXTREMITY VENOUS MEASUREMENTS:  RIGHT GREATER SAPHENOUS VEIN  Upper Thigh: 6.5 mm  Mid Thigh: 5 mm  Lower Thigh: 5 mm  Knee: 5 mm  Upper Calf: 3 mm  Mid Calf: 4 mm  Lower Calf: 4 mm  Ankle: 3 mm    LEFT LOWER EXTREMITY VENOUS MEASUREMENTS:  LEFT GREATER SAPHENOUS VEIN  Upper Thigh: 8 mm  Mid Thigh: 7 mm  Lower Thigh: 6 mm  Knee: 7 mm  Upper Calf: 4 mm  Mid Calf: 4 mm  Lower Calf: 5 mm  Ankle: 4 mm        Impression    IMPRESSION:  Bilateral lower extremity venous mapping, as described.    Final Interpretation Dictated By: Tesfaye Dodd MD  Date: 01/09/2021       PRELIMINARY REPORT - Final read in the a.m.    EXAM: ULTRASOUND LOWER EXTREMITIES VENOUS MAPPING BILATERAL  LOCATION: [facility]  DATE/TIME: 1/8/2021 11:05 PM    IMPRESSION: The great saphenous veins are patent bilaterally. The measurements of the venous diameters are detailed on the ultrasound technologist's worksheet.    Preliminary Interpretation Dictated By: Markell Appiah MD  Date: 1/9/2021     Cardiac Catheterization    Narrative    Successful insertion of an intra-aortic balloon pump.

## 2021-01-09 NOTE — ANESTHESIA PROCEDURE NOTES
CENTRAL LINE INSERTION PROCEDURE NOTE:       Staff -   Anesthesiologist:  Ruslan Lemons MD  Performed By: Anesthesiologist  Pre-Procedure  Performed by Ruslan Lemons MD  Location: OR      Pre-Anesthestic Checklist: patient identified, IV checked, site marked, risks and benefits discussed, informed consent, monitors and equipment checked, pre-op evaluation and at physician/surgeon's request    Timeout  Correct Patient: Yes   Correct Procedure: Yes   Correct Site: Yes   Correct Laterality: Yes   Correct Position: Yes   Site Marked: Yes   .   Procedure Documentation   Procedure: central line, new line and elective  Position: Trendelenburg  Patient Prep/Sterile Barriers; chlorhexidine gluconate and isopropyl alcohol, maximum sterile barriers used during central venous catheter insertion      Insertion Site:right, internal jugular  . A permanent image is entered into the patient's record.   Skin infiltrated with 5 mL of 1% lidocaine.  Catheter size: 9 Fr Cordis.  Assessment/Narrative    Catheter size: 9 Fr Cordis.     Secured by suture  Tegaderm and Biopatch dressing used.  blood aspirated from all lumens  All lumens flushed: Yes  Verification method: Ultrasound, Placement to be verified post-op and X-ray (CXR in ICU)  Comments:  Ultrasound Interpretation, central venous    1. Ultrasound guidance was used to evaluate potential access sites.  2. Ultrasound was also used to verify the patency of the vessel specified above.   3. Ultrasound was used to visualize the needle entering the vessel.   4. The visualized structures were anatomically normal.  5. There were no apparent abnormal pathological findings.  6. A permanent ultrasound image was saved in the patient's record.

## 2021-01-09 NOTE — ANESTHESIA PROCEDURE NOTES
ARTERIAL LINE PROCEDURE NOTE:      Staff -   Anesthesiologist:  Ruslan Lemons MD  Performed By: Anesthesiologist   Pre-Procedure  Performed by Ruslan Lemons MD  Location: OR      Pre-Anesthestic Checklist: patient identified, IV checked, site marked, risks and benefits discussed, informed consent, monitors and equipment checked, pre-op evaluation and at physician/surgeon's request    Timeout  Correct Patient: Yes   Correct Procedure: Yes   Correct Site: Yes   Correct Laterality: Yes   Correct Position: Yes   Site Marked: Yes   .   Procedure Documentation  Procedure: arterial line    Supine  Insertion Site:left (Radial).Skin infiltrated with 1 mL of 1% lidocaine. Injection technique: Seldinger Technique  .  .  Patient Prep/Sterile Barriers; all elements of maximal sterile barrier technique followed, mask, hat, sterile gown, sterile gloves, draped, hand hygiene, chlorhexidine gluconate and isopropyl alcohol    Assessment/Narrative    Catheter: 20 gauge, 1.75 in/4.5 cm quick cath (integral wire)     Secured by suture  Tegaderm dressing used.    Arterial waveform: Yes IBP within 10% of NIBP: Yes

## 2021-01-09 NOTE — PLAN OF CARE
A&Ox4, denies pain or SOB. Tele SR, VSS on RA. Right groin site with small firm hematoma unchanged since handoff. US imaging and chest xray and pre-op shower complete. NPO since midnight. Plan for balloon pump and CABG this am.

## 2021-01-09 NOTE — ANESTHESIA PROCEDURE NOTES
Airway   Date/Time: 1/9/2021 9:22 AM   Patient location during procedure: OR ([facility] - Operating Room or Procedural Area)    Staff -   Anesthesiologist:  Ruslan Lemons MD  CRNA: Alejandrina Varner APRN CRNA  Performed By: CRNA    Consent for Airway   Urgency: elective    Indications and Patient Condition  Indications for airway management: garcia-procedural  Induction type:intravenousMask difficulty assessment: 2 - vent by mask + OA or adjuvant +/- NMBA    Final Airway Details  Final airway type: endotracheal airway  Successful airway:Single subglottic suction and ETT - single  Endotracheal Airway Details   ETT size (mm): 8.0  Cuffed: yes  Successful intubation technique: video laryngoscopy  Grade View of Cords: 1  Adjucts: stylet  Measured from: gums/teeth  Secured at (cm): 23  Secured with: pink tape  Bite block used: None    Post intubation assessment   Number of attempts at approach: 1  Number of other approaches attempted: 0  Secured with:pink tape  Ease of procedure: easy  Dentition: Intact and Unchanged

## 2021-01-09 NOTE — PROGRESS NOTES
Procedure  1) R femoral IABP Placed without complication. Access obtained with US guidance and single anterior wall percutaneous stick.     Optimal balloon location confirmed with proper timing in cardiac cycle.     See formal cath lab note.

## 2021-01-09 NOTE — ANESTHESIA PREPROCEDURE EVALUATION
Anesthesia Pre-Procedure Evaluation    Patient: Mor Monson   MRN: [id number] : 1970          Preoperative Diagnosis: Unknown cause of injury [X58.XXXA]    Procedure(s):  CORONARY ARTERY BYPASS GRAFT (CABG), On pump, Fermin, Radial vein graft LIMA-LAD Radial artery graft-OM    No past medical history on file.  No past surgical history on file.    Anesthesia Evaluation     .             ROS/MED HX    ENT/Pulmonary:      (-) tobacco use and sleep apnea   Neurologic:       Cardiovascular:     (+) hypertension--CAD, --. : . CHF . RAY, . :. . Previous cardiac testing Echodate:21results:   Interpretation Summary     The visual ejection fraction is estimated at 50-55%.  Left ventricular systolic function is borderline reduced.  There are regional wall motion abnormalities as specified.  Mild aortic root dilatation.  The ascending aorta is Mildly dilated.  The study was technically difficult.  _____________________________________________________________________________  __        Left Ventricle  The left ventricle is normal in size. There is normal left ventricular wall  thickness. Diastolic Doppler findings (E/E' ratio and/or other parameters)  suggest left ventricular filling pressures are indeterminate. The visual  ejection fraction is estimated at 50-55%. Left ventricular systolic function  is borderline reduced. The mid anterolateral and mid anterior wall appear  moderately hypokinetic. There are regional wall motion abnormalities as  specified.     Right Ventricle  The right ventricle is normal in size and function.     Atria  Normal left atrial size. Right atrial size is normal. There is no color  Doppler evidence of an atrial shunt.     Mitral Valve  The mitral valve leaflets are mildly thickened. There is mild (1+) mitral  regurgitation.        Tricuspid Valve  There is trace tricuspid regurgitation. The right ventricular systolic  pressure is approximated at 31.4 mmHg plus the right atrial  pressure.     Aortic Valve  The aortic valve is trileaflet. There is mild trileaflet aortic sclerosis.  There is trace aortic regurgitation. No hemodynamically significant valvular  aortic stenosis.     Pulmonic Valve  There is no pulmonic valvular regurgitation. Normal pulmonic valve velocity.     Vessels  Mild aortic root dilatation. The ascending aorta is Mildly dilated. The  inferior vena cava was normal in size with preserved respiratory variability.  IVC diameter <2.1 cm collapsing >50% with sniff suggests a normal RA pressure  of 3 mmHg.     Pericardium  There is no pericardial effusion.        Rhythm  Sinus rhythm was noted.date: results:ECG reviewed date: results:Sr @64bpmCath date: 1/7/21 results:Left Main  Dist LM lesion is 90% stenosed. Culprit lesion. The lesion is ulcerative.  Left Anterior Descending  Ost LAD lesion is 50% stenosed.  Prox LAD lesion is 15% stenosed.  Left Circumflex  Prox Cx lesion is 15% stenosed.  Right Coronary Artery  Prox RCA lesion is 20% stenosed.  Dist RCA lesion is 65% stenosed.  Right Posterior Descending Artery  RPDA lesion is 10% stenosed.             Pulmonary hypertension: 1/8/21.   METS/Exercise Tolerance:     Hematologic:         Musculoskeletal:         GI/Hepatic:         Renal/Genitourinary:         Endo:     (+) Obesity, .      Psychiatric:     (+) psychiatric history anxiety and depression      Infectious Disease:         Malignancy:         Other:                          Physical Exam  Normal systems: cardiovascular, pulmonary and dental    Airway   Mallampati: III  TM distance: >3 FB  Neck ROM: full    Dental     Cardiovascular       Pulmonary             Lab Results   Component Value Date    WBC 25.2 (H) 01/09/2021    HGB 13.1 (L) 01/09/2021    HCT 39.2 (L) 01/09/2021     01/09/2021     01/09/2021    POTASSIUM 4.6 01/09/2021    CHLORIDE 109 01/09/2021    CO2 27 01/09/2021    BUN 14 01/09/2021    CR 0.92 01/09/2021     (H) 01/09/2021    JOSE ALBERTO  8.4 (L) 01/09/2021    ALBUMIN 3.9 01/08/2021    PROTTOTAL 7.2 01/08/2021    ALT 26 01/08/2021    AST 23 01/08/2021    ALKPHOS 62 01/08/2021    BILITOTAL 1.2 01/08/2021    PTT 33 01/09/2021    INR 1.30 (H) 01/09/2021    FIBR 273 01/09/2021       Preop Vitals  BP Readings from Last 3 Encounters:   01/09/21 (!) 148/99   01/08/21 124/71    Pulse Readings from Last 3 Encounters:   01/09/21 69   01/08/21 68      Resp Readings from Last 3 Encounters:   01/09/21 18   01/08/21 16    SpO2 Readings from Last 3 Encounters:   01/09/21 100%   01/08/21 98%      Temp Readings from Last 1 Encounters:   01/09/21 36.9  C (98.4  F) (Oral)    Ht Readings from Last 1 Encounters:   01/07/21 1.829 m (6')      Wt Readings from Last 1 Encounters:   01/09/21 116.2 kg (256 lb 1.6 oz)    Estimated body mass index is 34.73 kg/m  as calculated from the following:    Height as of 1/7/21: 1.829 m (6').    Weight as of this encounter: 116.2 kg (256 lb 1.6 oz).       Anesthesia Plan      History & Physical Review  History and physical reviewed and following examination; no interval change.    ASA Status:  4 .    NPO Status:  > 8 hours    Plan for General with Intravenous induction. Maintenance will be Inhalation.    PONV prophylaxis:  Ondansetron (or other 5HT-3)  Additional equipment: Videolaryngoscope, 2nd IV, Arterial Line, Central Line and CVP        Postoperative Care  Postoperative pain management:  IV analgesics and Oral pain medications.      Consents  Anesthetic plan, risks, benefits and alternatives discussed with:  Patient.  Use of blood products discussed: Yes.   Use of blood products discussed with Patient.  Consented to blood products.  .                 Ruslan Lemons MD

## 2021-01-09 NOTE — PROGRESS NOTES
CVTS update    Discussed with Dr. Rojas  Will plan for IABP tomorrow at 8am and CABG with left radial artery harvest to follow   Please remove any IVs in left arm  Nurse, cath lab team, patient and wife have been updated of plan.    Edna Beal PA-C

## 2021-01-09 NOTE — PLAN OF CARE
Neuro: A/Ox4  CV/Rhythm: SR  Resp/02: LS clear, RA  GI/Diet: Regular, then NPO at midnight  : need UA  Skin/Incisions/Sites: Rt groin side, hematoma on arrival, pressure held  Pulses/CMS: +2  Edema: NA  Activity/Falls Risk: bedrest until 9pm   Lines/Drains/IVs: SL x2, heparin to restart at 2100  Test/Procedures: Needs US and chest xray once off bedrest  VS/Pain: Bp's elevated but slowly improving last was 124/92  DC Plan:   Other: Ballon pump placement tomorrow with probable CABG Sunday, packet started.

## 2021-01-09 NOTE — ADDENDUM NOTE
Addendum  created 01/09/21 1707 by Connie Aguayo APRN CRNA    Intraprocedure LDAs edited, Intraprocedure Meds edited, LDA properties accepted

## 2021-01-09 NOTE — H&P
Critical Care  Note      01/09/2021    Name: Mor Monson MRN#: [id number]   Age: 50 year old YOB: 1970     Hsptl Day# 1  ICU DAY # 1    MV DAY # 1             Problem List:   Coronary Artery Disease  S/p CABG         Summary/Hospital Course:     50 year old male with HTN, CAD presented to [facility] on 1/7 with angina and a few weeks of progressive shortness of breath.  Was diagnosed with unstable angina, started on a heparin infusion followed by a [facility] which demonstrated an ulcerative lesion in the distal left main extending into ostia of LAD and left circumflex. Transferred to [facility].  IABP place morning of 1/9 and then CABG was performed after.      Assessment and plan :     Mor Monson IS a 50 year old male admitted on 1/8/2021 for unstable angina, and 2 vessel CABG on 1/9.   I have personally reviewed the daily labs, imaging studies.     My assessment and plan by system for this patient is as follows:    Neurology/Psychiatry:   1.Pain/analgesia  Plan  - Propofol until paralytic wears off  - scheduled tylenol  - dilaudid prn      Cardiovascular:   1. CAD: now s/p LIMA to LAD and radial artery graft-OM  2. Shock: likely post-op vasoplegia  Plan  - nicardipine to prevent graft vasospasm  - IABP until tomorrow morning per surgery  - aspirin daily  - continue amicar  - phenylephrine     Pulmonary/Ventilator Management:   1. Post-operative respiratory failure   Plan  - intubated with mechanical ventilation. Continue current ventilator settings until paralytic and sedation wear off. Will then perform pressure support trial    GI and Nutrition :   1. NPO      Renal/Fluids/Electrolytes:   1. No acute issues  Plan  - monitor function and electrolytes as needed with replacement per ICU protocols. - generally avoid nephrotoxic agents such as NSAID, IV contrast unless specifically required  - adjust medications as needed for renal clearance  - follow I/O's as appropriate.    Infectious  Disease:   1. periop abx with cefazolin      Endocrine:   1. No acute issues  Plan  - ICU insulin protocol, goal sugar <180      Hematology/Oncology:   1. No acute issues     IV/Access:   1. Venous access - right cordis  2. Arterial access - IABP  Plan  - central access required and necessary      ICU Prophylaxis:   1. DVT: Hep Subq/ LMWH/mechanical  2. VAP: HOB 30 degrees, chlorhexidine rinse  3. Stress Ulcer: PPI  4. Restraints: Nonviolent soft two point restraints required and necessary for patient safety and continued cares and good effect as patient continues to pull at necessary lines, tubes despite education and distraction. Will readdress daily.   5. Wound care  - per nursing  6. Feeding - NPO  7. Disposition - ICU        Key goals for next 24 hours:   1. Wake up from sedation   2. Pressure support trial  3. Hopeful extubation         Medical History:     HTN  No past surgical history on file.  Social History     Socioeconomic History     Marital status:      Spouse name: Not on file     Number of children: Not on file     Years of education: Not on file     Highest education level: Not on file   Occupational History     Not on file   Social Needs     Financial resource strain: Not on file     Food insecurity     Worry: Not on file     Inability: Not on file     Transportation needs     Medical: Not on file     Non-medical: Not on file   Tobacco Use     Smoking status: Not on file   Substance and Sexual Activity     Alcohol use: Not on file     Drug use: Not on file     Sexual activity: Not on file   Lifestyle     Physical activity     Days per week: Not on file     Minutes per session: Not on file     Stress: Not on file   Relationships     Social connections     Talks on phone: Not on file     Gets together: Not on file     Attends [employer] service: Not on file     Active member of club or organization: Not on file     Attends meetings of clubs or organizations: Not on file     Relationship status:  Not on file     Intimate partner violence     Fear of current or ex partner: Not on file     Emotionally abused: Not on file     Physically abused: Not on file     Forced sexual activity: Not on file   Other Topics Concern     Not on file   Social History Narrative     Not on file      No Known Allergies           Key Medications:       acetaminophen  975 mg Oral Q8H     [START ON 1/10/2021] aspirin  325 mg Oral Daily     ceFAZolin  2 g Intravenous Q8H     insulin aspart   Subcutaneous TID w/meals     [START ON 1/10/2021] lidocaine  1-2 patch Transdermal Q24H     lidocaine   Transdermal Q8H     mupirocin  0.5 g Both Nostrils BID     pantoprazole (PROTONIX) IV  40 mg Intravenous Daily     senna-docusate  1 tablet Oral BID    Or     senna-docusate  2 tablet Oral BID     sodium chloride (PF)  3 mL Intracatheter Q8H       aminocaproic acid (AMICAR) infusion 7.5gm/150mL ADULT       dextrose       dextrose 5% and 0.45% NaCl + KCl 20 mEq/L       EPINEPHrine       insulin (regular)       nitroGLYcerin       phenylephrine       BETA BLOCKER NOT PRESCRIBED          Home Meds  No current facility-administered medications on file prior to encounter.        ciprofloxacin-dexamethasone (CIPRODEX) 0.3-0.1 % otic suspension, Place 4 drops into both ears 2 times daily as needed       doxycycline hyclate (VIBRA-TABS) 100 MG tablet, Take 100 mg by mouth 2 times daily as needed (eye infection)       ibuprofen (ADVIL/MOTRIN) 200 MG tablet, Take 200-600 mg by mouth every 6 hours as needed for mild pain               Physical Examination:   Temp:  [97.9  F (36.6  C)-98.4  F (36.9  C)] 98.4  F (36.9  C)  Pulse:  [62-72] 69  Resp:  [13-21] 18  BP: (121-154)/() 148/99  SpO2:  [95 %-100 %] 100 %    Intake/Output Summary (Last 24 hours) at 1/9/2021 1557  Last data filed at 1/9/2021 1516  Gross per 24 hour   Intake 3580 ml   Output 1920 ml   Net 1660 ml     Wt Readings from Last 4 Encounters:   01/09/21 116.2 kg (256 lb 1.6 oz)   01/07/21  119.2 kg (262 lb 11.2 oz)     BP - Mean:  [] 117  Resp: 18    No lab results found in last 7 days.    GEN: no acute distress   HEENT: head ncat, sclera anicteric, OP patent, trachea midline    PULM: unlabored synchronous with vent, clear anteriorly    CV/COR: RRR S1S2 no gallop,  No rub, no murmur. Chest tubes with 45 mL of sanguinous output  ABD: soft nontender, hypoactive bowel sounds, no mass  EXT:  Warm. IABP in right groin  NEURO: currently paralyzed still.   SKIN: no obvious rash  LINES: clean, dry intact         Data:   All data and imaging reviewed     ROUTINE ICU LABS (Last four results)  CMP  Recent Labs   Lab 01/09/21  1425 01/08/21  0954 01/07/21  1158    137 138   POTASSIUM 4.6 4.0 3.6   CHLORIDE 109 108 107   CO2 27 24 27   ANIONGAP 4 5 4   * 113* 102*   BUN 14 9 14   CR 0.92 0.64* 0.77   GFRESTIMATED >90 >90 >90   GFRESTBLACK >90 >90 >90   JOSE ALBERTO 8.4* 9.0 9.3   PROTTOTAL  --  7.2 7.8   ALBUMIN  --  3.9 4.2   BILITOTAL  --  1.2 1.0   ALKPHOS  --  62 69   AST  --  23 17   ALT  --  26 31     CBC  Recent Labs   Lab 01/09/21  1425 01/08/21  0954 01/07/21  1158   WBC 25.2* 7.3 6.5   RBC 4.46 5.65 5.68   HGB 13.1* 17.2 17.0   HCT 39.2* 49.7 51.4   MCV 88 88 91   MCH 29.4 30.4 29.9   MCHC 33.4 34.6 33.1   RDW 12.3 12.1 12.2    264 252     INR  Recent Labs   Lab 01/09/21  1425   INR 1.30*     Arterial Blood GasNo lab results found in last 7 days.    All cultures:  No results for input(s): CULT in the last 168 hours.  Recent Results (from the past 24 hour(s))   XR Chest 2 Views    Narrative    EXAM: CHEST 2 VIEWS  LOCATION: [facility]  DATE/TIME: 1/8/2021 11:55 PM    INDICATION: Preoperative for coronary artery bypass surgery.    COMPARISON: 1/7/2021 at 1234 hours.    FINDINGS: The lungs are clear. Normal size cardiac silhouette.      Impression    IMPRESSION: No evidence of active cardiopulmonary disease.    US Upper Extremity Arterial Duplex Left    Narrative    EXAM: US  UPPER EXTREMITY ARTERIAL DUPLEX LEFT  LOCATION: [facility]  DATE/TIME: 1/8/2021 11:05 PM    INDICATION: Left arterial mapping for coronary bypass graft.    COMPARISON: None.    TECHNIQUE: Arterial duplex ultrasound of the left upper extremity. Color flow and spectral Doppler with waveform analysis performed.    FINDINGS: Ultrasound includes evaluation of the brachial artery mid upper arm through the radial and ulnar arteries of the wrist.     LEFT BRACHIAL ARTERY  Above antecubital fossa: 8 mm diameter. Velocity 80 cm/s (triphasic).    LEFT RADIAL ARTERY  Proximal forearm: 4 mm diameter.  Mid forearm: 3 mm diameter.  Distal forearm: 3 mm diameter.  At wrist: 4 mm diameter. Velocity 90 cm/s (triphasic).    LEFT ULNAR ARTERY  At wrist: Velocity 76 cm/s (triphasic).      Impression    IMPRESSION: Patent brachial, radial, and ulnar arteries from just above antecubital fossa to wrist as described.     US Carotid Bilateral    Narrative    EXAM: ULTRASOUND CAROTID BILATERAL  LOCATION: [facility]  DATE/TIME: 1/8/2021 11:05 PM    INDICATION: Preoperative.  COMPARISON: None.  TECHNIQUE: Duplex exam performed utilizing 2D gray-scale imaging, Doppler interrogation with color-flow and spectral waveform analysis. The percent diameter stenosis is determined using NASCET criteria and Society of Radiologists in Ultrasound Consensus   Criteria.    FINDINGS:    RIGHT: Mild plaque in the distal common carotid artery. The peak systolic velocity in the ICA is less than 125 cm/sec, consistent with less than 50% stenosis. Normal velocities in the ECA. Antegrade flow within the vertebral artery.     LEFT: The peak systolic velocity in the ICA is less than 125 cm/sec, consistent with less than 50% stenosis. Normal velocities in the ECA. Antegrade flow within the vertebral artery.    VELOCITY CHART:  CCA   Right: 110 cm/s   Left: 100 cm/s  ICA   Right: 84 cm/s   Left: 75 cm/s  ECA   Right: 129 cm/s   Left: 106  cm/s  ICA/CCA PSV Ratio   Right: 0.8   Left: 0.8      Impression    IMPRESSION: No evidence of significant stenosis within bilateral carotid arteries.   US Lower Extremity Venous Mapping Bilateral    Narrative    FINAL REPORT    [facility]    DATE: 01/09/2021, 8:26 AM    EXAM: ULTRASOUND VENOUS MAPPING STUDY    INDICATION: Preop CABG.    TECHNIQUE: Ultrasound examination of the bilateral lower extremity veins was performed.    VENOUS DIAMETERS    RIGHT LOWER EXTREMITY VENOUS MEASUREMENTS:  RIGHT GREATER SAPHENOUS VEIN  Upper Thigh: 6.5 mm  Mid Thigh: 5 mm  Lower Thigh: 5 mm  Knee: 5 mm  Upper Calf: 3 mm  Mid Calf: 4 mm  Lower Calf: 4 mm  Ankle: 3 mm    LEFT LOWER EXTREMITY VENOUS MEASUREMENTS:  LEFT GREATER SAPHENOUS VEIN  Upper Thigh: 8 mm  Mid Thigh: 7 mm  Lower Thigh: 6 mm  Knee: 7 mm  Upper Calf: 4 mm  Mid Calf: 4 mm  Lower Calf: 5 mm  Ankle: 4 mm        Impression    IMPRESSION:  Bilateral lower extremity venous mapping, as described.    Final Interpretation Dictated By: Tesfaye Dodd MD  Date: 01/09/2021       PRELIMINARY REPORT - Final read in the a.m.    EXAM: ULTRASOUND LOWER EXTREMITIES VENOUS MAPPING BILATERAL  LOCATION: [facility]  DATE/TIME: 1/8/2021 11:05 PM    IMPRESSION: The great saphenous veins are patent bilaterally. The measurements of the venous diameters are detailed on the ultrasound technologist's worksheet.    Preliminary Interpretation Dictated By: Markell Appiah MD  Date: 1/9/2021     Cardiac Catheterization    Narrative    Successful insertion of an intra-aortic balloon pump.          Billing: This patient is critically ill: Yes. Total critical care time today 35 min.

## 2021-01-10 ENCOUNTER — APPOINTMENT (OUTPATIENT)
Dept: GENERAL RADIOLOGY | Facility: CLINIC | Age: 51
DRG: 235 | End: 2021-01-10
Attending: PHYSICIAN ASSISTANT
Payer: COMMERCIAL

## 2021-01-10 ENCOUNTER — APPOINTMENT (OUTPATIENT)
Dept: GENERAL RADIOLOGY | Facility: CLINIC | Age: 51
DRG: 235 | End: 2021-01-10
Payer: COMMERCIAL

## 2021-01-10 LAB
ALBUMIN SERPL-MCNC: 3.4 G/DL (ref 3.4–5)
ALP SERPL-CCNC: 42 U/L (ref 40–150)
ALT SERPL W P-5'-P-CCNC: 25 U/L (ref 0–70)
ANION GAP SERPL CALCULATED.3IONS-SCNC: 4 MMOL/L (ref 3–14)
APTT PPP: 35 SEC (ref 22–37)
APTT PPP: 38 SEC (ref 22–37)
AST SERPL W P-5'-P-CCNC: 38 U/L (ref 0–45)
BASE DEFICIT BLDA-SCNC: 0.5 MMOL/L
BASE EXCESS BLDA CALC-SCNC: 2.1 MMOL/L
BASOPHILS # BLD AUTO: 0 10E9/L (ref 0–0.2)
BASOPHILS NFR BLD AUTO: 0.2 %
BILIRUB SERPL-MCNC: 0.9 MG/DL (ref 0.2–1.3)
BUN SERPL-MCNC: 20 MG/DL (ref 7–30)
CA-I BLD-MCNC: 4.4 MG/DL (ref 4.4–5.2)
CA-I BLD-MCNC: 4.8 MG/DL (ref 4.4–5.2)
CALCIUM SERPL-MCNC: 8.1 MG/DL (ref 8.5–10.1)
CHLORIDE SERPL-SCNC: 112 MMOL/L (ref 94–109)
CO2 SERPL-SCNC: 25 MMOL/L (ref 20–32)
CREAT SERPL-MCNC: 0.89 MG/DL (ref 0.66–1.25)
DIFFERENTIAL METHOD BLD: ABNORMAL
EOSINOPHIL # BLD AUTO: 0 10E9/L (ref 0–0.7)
EOSINOPHIL NFR BLD AUTO: 0.2 %
ERYTHROCYTE [DISTWIDTH] IN BLOOD BY AUTOMATED COUNT: 12.8 % (ref 10–15)
ERYTHROCYTE [DISTWIDTH] IN BLOOD BY AUTOMATED COUNT: 12.8 % (ref 10–15)
FIBRINOGEN PPP-MCNC: 400 MG/DL (ref 200–420)
FIBRINOGEN PPP-MCNC: 459 MG/DL (ref 200–420)
GFR SERPL CREATININE-BSD FRML MDRD: >90 ML/MIN/{1.73_M2}
GLUCOSE BLDC GLUCOMTR-MCNC: 125 MG/DL (ref 70–99)
GLUCOSE BLDC GLUCOMTR-MCNC: 127 MG/DL (ref 70–99)
GLUCOSE SERPL-MCNC: 139 MG/DL (ref 70–99)
GRAM STN SPEC: NORMAL
GRAM STN SPEC: NORMAL
HCO3 BLD-SCNC: 25 MMOL/L (ref 21–28)
HCO3 BLD-SCNC: 27 MMOL/L (ref 21–28)
HCT VFR BLD AUTO: 35.7 % (ref 40–53)
HCT VFR BLD AUTO: 40 % (ref 40–53)
HGB BLD-MCNC: 11.7 G/DL (ref 13.3–17.7)
HGB BLD-MCNC: 13.2 G/DL (ref 13.3–17.7)
IMM GRANULOCYTES # BLD: 0.1 10E9/L (ref 0–0.4)
IMM GRANULOCYTES NFR BLD: 0.5 %
INR PPP: 1.26 (ref 0.86–1.14)
INR PPP: 1.31 (ref 0.86–1.14)
LYMPHOCYTES # BLD AUTO: 1.3 10E9/L (ref 0.8–5.3)
LYMPHOCYTES NFR BLD AUTO: 10.3 %
Lab: NORMAL
MAGNESIUM SERPL-MCNC: 2.2 MG/DL (ref 1.6–2.3)
MAGNESIUM SERPL-MCNC: 2.4 MG/DL (ref 1.6–2.3)
MCH RBC QN AUTO: 29.7 PG (ref 26.5–33)
MCH RBC QN AUTO: 29.7 PG (ref 26.5–33)
MCHC RBC AUTO-ENTMCNC: 32.8 G/DL (ref 31.5–36.5)
MCHC RBC AUTO-ENTMCNC: 33 G/DL (ref 31.5–36.5)
MCV RBC AUTO: 90 FL (ref 78–100)
MCV RBC AUTO: 91 FL (ref 78–100)
MONOCYTES # BLD AUTO: 1.4 10E9/L (ref 0–1.3)
MONOCYTES NFR BLD AUTO: 11.2 %
NEUTROPHILS # BLD AUTO: 9.8 10E9/L (ref 1.6–8.3)
NEUTROPHILS NFR BLD AUTO: 77.6 %
NRBC # BLD AUTO: 0 10*3/UL
NRBC BLD AUTO-RTO: 0 /100
OXYHGB MFR BLD: 92 % (ref 92–100)
OXYHGB MFR BLD: 94 % (ref 92–100)
PCO2 BLD: 41 MM HG (ref 35–45)
PCO2 BLD: 42 MM HG (ref 35–45)
PH BLD: 7.38 PH (ref 7.35–7.45)
PH BLD: 7.43 PH (ref 7.35–7.45)
PHOSPHATE SERPL-MCNC: 3.5 MG/DL (ref 2.5–4.5)
PLATELET # BLD AUTO: 169 10E9/L (ref 150–450)
PLATELET # BLD AUTO: 244 10E9/L (ref 150–450)
PO2 BLD: 65 MM HG (ref 80–105)
PO2 BLD: 66 MM HG (ref 80–105)
POTASSIUM SERPL-SCNC: 3.7 MMOL/L (ref 3.4–5.3)
POTASSIUM SERPL-SCNC: 3.8 MMOL/L (ref 3.4–5.3)
PROT SERPL-MCNC: 5.8 G/DL (ref 6.8–8.8)
RBC # BLD AUTO: 3.94 10E12/L (ref 4.4–5.9)
RBC # BLD AUTO: 4.44 10E12/L (ref 4.4–5.9)
SODIUM SERPL-SCNC: 141 MMOL/L (ref 133–144)
SPECIMEN SOURCE: NORMAL
WBC # BLD AUTO: 12.5 10E9/L (ref 4–11)
WBC # BLD AUTO: 15.7 10E9/L (ref 4–11)

## 2021-01-10 PROCEDURE — 84132 ASSAY OF SERUM POTASSIUM: CPT | Performed by: SURGERY

## 2021-01-10 PROCEDURE — 999N000065 XR CHEST PORT 1 VW

## 2021-01-10 PROCEDURE — 250N000009 HC RX 250

## 2021-01-10 PROCEDURE — 93005 ELECTROCARDIOGRAM TRACING: CPT

## 2021-01-10 PROCEDURE — 85610 PROTHROMBIN TIME: CPT | Performed by: SURGERY

## 2021-01-10 PROCEDURE — 93010 ELECTROCARDIOGRAM REPORT: CPT | Performed by: INTERNAL MEDICINE

## 2021-01-10 PROCEDURE — 94640 AIRWAY INHALATION TREATMENT: CPT

## 2021-01-10 PROCEDURE — 99291 CRITICAL CARE FIRST HOUR: CPT | Mod: 25

## 2021-01-10 PROCEDURE — 85730 THROMBOPLASTIN TIME PARTIAL: CPT | Performed by: PSYCHIATRY & NEUROLOGY

## 2021-01-10 PROCEDURE — 250N000009 HC RX 250: Performed by: PHYSICIAN ASSISTANT

## 2021-01-10 PROCEDURE — 250N000011 HC RX IP 250 OP 636: Performed by: SURGERY

## 2021-01-10 PROCEDURE — 80053 COMPREHEN METABOLIC PANEL: CPT | Performed by: PHYSICIAN ASSISTANT

## 2021-01-10 PROCEDURE — C9113 INJ PANTOPRAZOLE SODIUM, VIA: HCPCS | Performed by: PHYSICIAN ASSISTANT

## 2021-01-10 PROCEDURE — 82330 ASSAY OF CALCIUM: CPT | Performed by: PHYSICIAN ASSISTANT

## 2021-01-10 PROCEDURE — 85384 FIBRINOGEN ACTIVITY: CPT | Performed by: SURGERY

## 2021-01-10 PROCEDURE — 94640 AIRWAY INHALATION TREATMENT: CPT | Mod: 76

## 2021-01-10 PROCEDURE — 85730 THROMBOPLASTIN TIME PARTIAL: CPT | Performed by: SURGERY

## 2021-01-10 PROCEDURE — 87205 SMEAR GRAM STAIN: CPT

## 2021-01-10 PROCEDURE — 85384 FIBRINOGEN ACTIVITY: CPT | Performed by: PSYCHIATRY & NEUROLOGY

## 2021-01-10 PROCEDURE — 82330 ASSAY OF CALCIUM: CPT | Performed by: SURGERY

## 2021-01-10 PROCEDURE — 71045 X-RAY EXAM CHEST 1 VIEW: CPT

## 2021-01-10 PROCEDURE — 83735 ASSAY OF MAGNESIUM: CPT | Performed by: PHYSICIAN ASSISTANT

## 2021-01-10 PROCEDURE — 250N000011 HC RX IP 250 OP 636: Performed by: PHYSICIAN ASSISTANT

## 2021-01-10 PROCEDURE — 250N000011 HC RX IP 250 OP 636

## 2021-01-10 PROCEDURE — 85027 COMPLETE CBC AUTOMATED: CPT | Performed by: PHYSICIAN ASSISTANT

## 2021-01-10 PROCEDURE — 31622 DX BRONCHOSCOPE/WASH: CPT

## 2021-01-10 PROCEDURE — 999N000157 HC STATISTIC RCP TIME EA 10 MIN

## 2021-01-10 PROCEDURE — 82805 BLOOD GASES W/O2 SATURATION: CPT | Performed by: PHYSICIAN ASSISTANT

## 2021-01-10 PROCEDURE — 87070 CULTURE OTHR SPECIMN AEROBIC: CPT

## 2021-01-10 PROCEDURE — 0BJ08ZZ INSPECTION OF TRACHEOBRONCHIAL TREE, VIA NATURAL OR ARTIFICIAL OPENING ENDOSCOPIC: ICD-10-PCS

## 2021-01-10 PROCEDURE — 250N000013 HC RX MED GY IP 250 OP 250 PS 637

## 2021-01-10 PROCEDURE — 200N000001 HC R&B ICU

## 2021-01-10 PROCEDURE — P9041 ALBUMIN (HUMAN),5%, 50ML: HCPCS | Performed by: PHYSICIAN ASSISTANT

## 2021-01-10 PROCEDURE — 258N000003 HC RX IP 258 OP 636: Performed by: PHYSICIAN ASSISTANT

## 2021-01-10 PROCEDURE — 85025 COMPLETE CBC W/AUTO DIFF WBC: CPT | Performed by: PSYCHIATRY & NEUROLOGY

## 2021-01-10 PROCEDURE — 94003 VENT MGMT INPAT SUBQ DAY: CPT

## 2021-01-10 PROCEDURE — 84100 ASSAY OF PHOSPHORUS: CPT | Performed by: PHYSICIAN ASSISTANT

## 2021-01-10 PROCEDURE — 999N000009 HC STATISTIC AIRWAY CARE

## 2021-01-10 PROCEDURE — P9041 ALBUMIN (HUMAN),5%, 50ML: HCPCS

## 2021-01-10 PROCEDURE — 85610 PROTHROMBIN TIME: CPT | Performed by: PSYCHIATRY & NEUROLOGY

## 2021-01-10 PROCEDURE — 250N000013 HC RX MED GY IP 250 OP 250 PS 637: Performed by: PHYSICIAN ASSISTANT

## 2021-01-10 PROCEDURE — 999N001017 HC STATISTIC GLUCOSE BY METER IP

## 2021-01-10 PROCEDURE — 83735 ASSAY OF MAGNESIUM: CPT | Performed by: SURGERY

## 2021-01-10 RX ORDER — AMPICILLIN AND SULBACTAM 2; 1 G/1; G/1
3 INJECTION, POWDER, FOR SOLUTION INTRAMUSCULAR; INTRAVENOUS EVERY 6 HOURS
Status: DISCONTINUED | OUTPATIENT
Start: 2021-01-10 | End: 2021-01-14 | Stop reason: HOSPADM

## 2021-01-10 RX ORDER — POTASSIUM CHLORIDE 29.8 MG/ML
20 INJECTION INTRAVENOUS ONCE
Status: COMPLETED | OUTPATIENT
Start: 2021-01-10 | End: 2021-01-10

## 2021-01-10 RX ORDER — ROSUVASTATIN CALCIUM 20 MG/1
20 TABLET, COATED ORAL DAILY
Status: DISCONTINUED | OUTPATIENT
Start: 2021-01-10 | End: 2021-01-14 | Stop reason: HOSPADM

## 2021-01-10 RX ORDER — ALBUMIN, HUMAN INJ 5% 5 %
500 SOLUTION INTRAVENOUS ONCE
Status: COMPLETED | OUTPATIENT
Start: 2021-01-10 | End: 2021-01-10

## 2021-01-10 RX ORDER — ALBUMIN, HUMAN INJ 5% 5 %
SOLUTION INTRAVENOUS
Status: DISCONTINUED
Start: 2021-01-10 | End: 2021-01-11 | Stop reason: HOSPADM

## 2021-01-10 RX ORDER — ALBUMIN, HUMAN INJ 5% 5 %
SOLUTION INTRAVENOUS
Status: COMPLETED
Start: 2021-01-10 | End: 2021-01-10

## 2021-01-10 RX ORDER — METOPROLOL TARTRATE 1 MG/ML
2.5 INJECTION, SOLUTION INTRAVENOUS ONCE
Status: COMPLETED | OUTPATIENT
Start: 2021-01-10 | End: 2021-01-10

## 2021-01-10 RX ORDER — FUROSEMIDE 10 MG/ML
10 INJECTION INTRAMUSCULAR; INTRAVENOUS ONCE
Status: COMPLETED | OUTPATIENT
Start: 2021-01-10 | End: 2021-01-10

## 2021-01-10 RX ORDER — HEPARIN SODIUM 5000 [USP'U]/.5ML
5000 INJECTION, SOLUTION INTRAVENOUS; SUBCUTANEOUS EVERY 8 HOURS
Status: DISCONTINUED | OUTPATIENT
Start: 2021-01-10 | End: 2021-01-14 | Stop reason: HOSPADM

## 2021-01-10 RX ORDER — AMLODIPINE BESYLATE 2.5 MG/1
2.5 TABLET ORAL DAILY
Status: DISCONTINUED | OUTPATIENT
Start: 2021-01-10 | End: 2021-01-14

## 2021-01-10 RX ADMIN — AMLODIPINE BESYLATE 2.5 MG: 2.5 TABLET ORAL at 08:59

## 2021-01-10 RX ADMIN — ONDANSETRON 4 MG: 2 INJECTION INTRAMUSCULAR; INTRAVENOUS at 23:55

## 2021-01-10 RX ADMIN — OXYCODONE HYDROCHLORIDE 5 MG: 5 TABLET ORAL at 23:49

## 2021-01-10 RX ADMIN — ROSUVASTATIN CALCIUM 20 MG: 20 TABLET, FILM COATED ORAL at 14:40

## 2021-01-10 RX ADMIN — AMPICILLIN AND SULBACTAM 3 G: 2; 1 INJECTION, POWDER, FOR SOLUTION INTRAMUSCULAR; INTRAVENOUS at 14:03

## 2021-01-10 RX ADMIN — HYDROMORPHONE HYDROCHLORIDE 0.5 MG: 1 INJECTION, SOLUTION INTRAMUSCULAR; INTRAVENOUS; SUBCUTANEOUS at 07:48

## 2021-01-10 RX ADMIN — PROPOFOL 75 MCG/KG/MIN: 10 INJECTION, EMULSION INTRAVENOUS at 14:02

## 2021-01-10 RX ADMIN — IPRATROPIUM BROMIDE AND ALBUTEROL SULFATE 3 ML: .5; 3 SOLUTION RESPIRATORY (INHALATION) at 07:05

## 2021-01-10 RX ADMIN — IPRATROPIUM BROMIDE AND ALBUTEROL SULFATE 3 ML: .5; 3 SOLUTION RESPIRATORY (INHALATION) at 15:21

## 2021-01-10 RX ADMIN — LIDOCAINE 1 PATCH: 560 PATCH PERCUTANEOUS; TOPICAL; TRANSDERMAL at 07:48

## 2021-01-10 RX ADMIN — PROPOFOL 65 MCG/KG/MIN: 10 INJECTION, EMULSION INTRAVENOUS at 01:28

## 2021-01-10 RX ADMIN — OXYCODONE HYDROCHLORIDE 10 MG: 5 TABLET ORAL at 08:59

## 2021-01-10 RX ADMIN — PROPOFOL 75 MCG/KG/MIN: 10 INJECTION, EMULSION INTRAVENOUS at 12:27

## 2021-01-10 RX ADMIN — PANTOPRAZOLE SODIUM 40 MG: 40 INJECTION, POWDER, FOR SOLUTION INTRAVENOUS at 07:48

## 2021-01-10 RX ADMIN — ACETAMINOPHEN 975 MG: 325 TABLET, FILM COATED ORAL at 20:13

## 2021-01-10 RX ADMIN — AMPICILLIN AND SULBACTAM 3 G: 2; 1 INJECTION, POWDER, FOR SOLUTION INTRAMUSCULAR; INTRAVENOUS at 20:13

## 2021-01-10 RX ADMIN — FUROSEMIDE 10 MG: 10 INJECTION, SOLUTION INTRAVENOUS at 21:40

## 2021-01-10 RX ADMIN — PROPOFOL 75 MCG/KG/MIN: 10 INJECTION, EMULSION INTRAVENOUS at 11:20

## 2021-01-10 RX ADMIN — CEFAZOLIN SODIUM 2 G: 2 INJECTION, SOLUTION INTRAVENOUS at 06:03

## 2021-01-10 RX ADMIN — HYDROMORPHONE HYDROCHLORIDE 0.5 MG: 1 INJECTION, SOLUTION INTRAMUSCULAR; INTRAVENOUS; SUBCUTANEOUS at 16:35

## 2021-01-10 RX ADMIN — IPRATROPIUM BROMIDE AND ALBUTEROL SULFATE 3 ML: .5; 3 SOLUTION RESPIRATORY (INHALATION) at 23:06

## 2021-01-10 RX ADMIN — OXYCODONE HYDROCHLORIDE 10 MG: 5 TABLET ORAL at 14:40

## 2021-01-10 RX ADMIN — ALBUMIN HUMAN 25 G: 0.05 INJECTION, SOLUTION INTRAVENOUS at 12:28

## 2021-01-10 RX ADMIN — IPRATROPIUM BROMIDE AND ALBUTEROL SULFATE 3 ML: .5; 3 SOLUTION RESPIRATORY (INHALATION) at 11:12

## 2021-01-10 RX ADMIN — PROPOFOL 70 MCG/KG/MIN: 10 INJECTION, EMULSION INTRAVENOUS at 08:24

## 2021-01-10 RX ADMIN — PHENYLEPHRINE HYDROCHLORIDE 1 MCG/KG/MIN: 10 INJECTION, SOLUTION INTRAVENOUS at 06:04

## 2021-01-10 RX ADMIN — IPRATROPIUM BROMIDE AND ALBUTEROL SULFATE 3 ML: .5; 3 SOLUTION RESPIRATORY (INHALATION) at 19:35

## 2021-01-10 RX ADMIN — CHLORHEXIDINE GLUCONATE 0.12% ORAL RINSE 15 ML: 1.2 LIQUID ORAL at 08:24

## 2021-01-10 RX ADMIN — PROPOFOL 70 MCG/KG/MIN: 10 INJECTION, EMULSION INTRAVENOUS at 03:37

## 2021-01-10 RX ADMIN — AMPICILLIN AND SULBACTAM 3 G: 2; 1 INJECTION, POWDER, FOR SOLUTION INTRAMUSCULAR; INTRAVENOUS at 08:24

## 2021-01-10 RX ADMIN — ALBUMIN HUMAN 500 ML: 0.05 INJECTION, SOLUTION INTRAVENOUS at 15:54

## 2021-01-10 RX ADMIN — ACETAMINOPHEN 975 MG: 325 TABLET, FILM COATED ORAL at 12:28

## 2021-01-10 RX ADMIN — HYDROMORPHONE HYDROCHLORIDE 0.3 MG: 1 INJECTION, SOLUTION INTRAMUSCULAR; INTRAVENOUS; SUBCUTANEOUS at 23:04

## 2021-01-10 RX ADMIN — ACETAMINOPHEN 975 MG: 325 TABLET, FILM COATED ORAL at 05:18

## 2021-01-10 RX ADMIN — PROPOFOL 75 MCG/KG/MIN: 10 INJECTION, EMULSION INTRAVENOUS at 07:11

## 2021-01-10 RX ADMIN — HYDROMORPHONE HYDROCHLORIDE 0.3 MG: 1 INJECTION, SOLUTION INTRAMUSCULAR; INTRAVENOUS; SUBCUTANEOUS at 18:43

## 2021-01-10 RX ADMIN — HYDROMORPHONE HYDROCHLORIDE 0.2 MG: 1 INJECTION, SOLUTION INTRAMUSCULAR; INTRAVENOUS; SUBCUTANEOUS at 19:30

## 2021-01-10 RX ADMIN — PROPOFOL 75 MCG/KG/MIN: 10 INJECTION, EMULSION INTRAVENOUS at 16:24

## 2021-01-10 RX ADMIN — PROPOFOL 70 MCG/KG/MIN: 10 INJECTION, EMULSION INTRAVENOUS at 06:04

## 2021-01-10 RX ADMIN — HYDROMORPHONE HYDROCHLORIDE 0.5 MG: 1 INJECTION, SOLUTION INTRAMUSCULAR; INTRAVENOUS; SUBCUTANEOUS at 10:31

## 2021-01-10 RX ADMIN — POTASSIUM CHLORIDE, DEXTROSE MONOHYDRATE AND SODIUM CHLORIDE: 150; 5; 450 INJECTION, SOLUTION INTRAVENOUS at 16:24

## 2021-01-10 RX ADMIN — POTASSIUM CHLORIDE 20 MEQ: 29.8 INJECTION, SOLUTION INTRAVENOUS at 22:50

## 2021-01-10 RX ADMIN — IPRATROPIUM BROMIDE AND ALBUTEROL SULFATE 3 ML: .5; 3 SOLUTION RESPIRATORY (INHALATION) at 02:51

## 2021-01-10 RX ADMIN — LIDOCAINE HYDROCHLORIDE 5 ML: 10 INJECTION, SOLUTION INFILTRATION; PERINEURAL at 11:20

## 2021-01-10 RX ADMIN — HYDROMORPHONE HYDROCHLORIDE 0.5 MG: 1 INJECTION, SOLUTION INTRAMUSCULAR; INTRAVENOUS; SUBCUTANEOUS at 03:37

## 2021-01-10 RX ADMIN — METHOCARBAMOL TABLETS 750 MG: 750 TABLET, COATED ORAL at 23:49

## 2021-01-10 RX ADMIN — METOPROLOL TARTRATE 2.5 MG: 5 INJECTION INTRAVENOUS at 22:32

## 2021-01-10 RX ADMIN — HYDROMORPHONE HYDROCHLORIDE 0.5 MG: 1 INJECTION, SOLUTION INTRAMUSCULAR; INTRAVENOUS; SUBCUTANEOUS at 01:21

## 2021-01-10 RX ADMIN — HYDROMORPHONE HYDROCHLORIDE 0.5 MG: 1 INJECTION, SOLUTION INTRAMUSCULAR; INTRAVENOUS; SUBCUTANEOUS at 12:28

## 2021-01-10 ASSESSMENT — ACTIVITIES OF DAILY LIVING (ADL)
ADLS_ACUITY_SCORE: 21
ADLS_ACUITY_SCORE: 25
ADLS_ACUITY_SCORE: 21

## 2021-01-10 NOTE — PROGRESS NOTES
[facility] ICU RESPIRATORY NOTE  Date of Admission: 1/9/21  Date of Intubation (most recent): 1/9/21  Reason for Mechanical Ventilation: CABG  Number of Days on Mechanical Ventilation: 2  Met Criteria for Pressure Support Trial:   Length of Pressure Support Trial:    Reason for Stopping Pressure Support Trial:  Reason for No Pressure Support Trial: Per MD     Significant Events Today: None overnight     ABG Results:   Recent Labs   Lab 01/09/21  1620   PH 7.32*   PCO2 47*   PO2 66*   HCO3 24     Ventilation Mode: CMV/AC  (Continuous Mandatory Ventilation/ Assist Control)  FiO2 (%): 45 %  Rate Set (breaths/minute): 18 breaths/min  Tidal Volume Set (mL): 550 mL  PEEP (cm H2O): 5 cmH2O  Oxygen Concentration (%): 45 %  Resp: 8    RENE Tovar, RRT

## 2021-01-10 NOTE — PLAN OF CARE
Pt is sedated with Propofol gtt. Not following commands at this time. Pt becomes agitated when sedation lightened. PRN dilaudid given. Restraints to holden wrists and right ankle.   Tele. NSR. Pacer set to 80. IABP goal augmentation 70-80. Nicardipine on per [facility] till AM. Albert titrated for MAP. Albumin 500ml given.   CTx3 with serosang drainage. Higher output. STAT coags sent. Dr. Rojas notified. Protamine ordered.   Remains intubated. Unable to wean at this time.   Cr good uop. OG to LISW.   Incisions CDI. Doppler pulses in BLE.

## 2021-01-10 NOTE — PLAN OF CARE
Patient is alert and oriented. Denies pain.   1030 Bronch. Extubated at 1725 to oxymask. Tolerated well. Cough/deep breathing encouraged.   Balloon pump discontinued at 1002. Noted swelling around site at 1500 femstop reapplied.Improved. MD notified.CTM  Tele ST. Albert titrated off for MAP goal. Albumin 500 ml given x 2.   Incision to chest and left arm. CDI.  CT x 3 with moderate output. CTM. Coags checked.   Tayo good uop.   Patients wife Jessica updated.

## 2021-01-10 NOTE — PROGRESS NOTES
Critical Care  Note      01/10/2021    Name: Mor Monson MRN#: [id number]   Age: 50 year old YOB: 1970     Hsptl Day# 2  ICU DAY # 2    MV DAY # 2             Problem List:   Coronary Artery Disease  S/p CABG         Summary/Hospital Course:     50 year old male with HTN, CAD presented to [facility] on 1/7 with angina and a few weeks of progressive shortness of breath.  Was diagnosed with unstable angina, started on a heparin infusion followed by a [facility] which demonstrated an ulcerative lesion in the distal left main extending into ostia of LAD and left circumflex. Transferred to [facility].  IABP place morning of 1/9 and then CABG was performed after.      Assessment and plan :     Mor Monson IS a 50 year old male admitted on 1/8/2021 for unstable angina, and 2 vessel CABG on 1/9.   I have personally reviewed the daily labs, imaging studies.     My assessment and plan by system for this patient is as follows:    Neurology/Psychiatry:   1.Pain/analgesia  Plan  - Propofol and dilaudid prn  - scheduled tylenol      Cardiovascular:   1. CAD: now s/p LIMA to LAD and radial artery graft-OM  2. Shock: likely post-op vasoplegia   Plan  - nicardipine off this AM  - IABP to be removed today   - aspirin daily  - phenylephrine    Pulmonary/Ventilator Management:   1. Hypoxemic respiratory failure: LLL consolidation.  Was not present morning of surgery.  Could be atelectasis vs aspiration. This morning's CXR looks like ETT is in R main stem, but yesterday's was okay.     Plan  - repeat CXR, and assess ET tube  - sputum culture and start unasyn  - possible bronchoscopy  - not ready for extubation yet.     GI and Nutrition :   1. NPO    Renal/Fluids/Electrolytes:   1. No acute issues  Plan  - monitor function and electrolytes as needed with replacement per ICU protocols. - generally avoid nephrotoxic agents such as NSAID, IV contrast unless specifically required  - adjust medications as needed for  renal clearance  - follow I/O's as appropriate.    Infectious Disease:   1. Possible aspiration pneumonia.    - unasyn       Endocrine:   1. No acute issues  Plan  - ICU insulin protocol, goal sugar <180      Hematology/Oncology:   1. No acute issues     IV/Access:   1. Venous access - right cordis  2. Arterial access - IABP  Plan  - central access required and necessary      ICU Prophylaxis:   1. DVT: Hep Subq/ LMWH/mechanical  2. VAP: HOB 30 degrees, chlorhexidine rinse  3. Stress Ulcer: PPI  4. Restraints: Nonviolent soft two point restraints required and necessary for patient safety and continued cares and good effect as patient continues to pull at necessary lines, tubes despite education and distraction. Will readdress daily.   5. Wound care  - per nursing  6. Feeding - NPO  7. Disposition - ICU        Key goals for next 24 hours:   1. Remove IABP  2. Assess ET tube  3. Possible bronchoscopy         Medical History:     HTN  No past surgical history on file.  Social History     Socioeconomic History     Marital status:      Spouse name: Not on file     Number of children: Not on file     Years of education: Not on file     Highest education level: Not on file   Occupational History     Not on file   Social Needs     Financial resource strain: Not on file     Food insecurity     Worry: Not on file     Inability: Not on file     Transportation needs     Medical: Not on file     Non-medical: Not on file   Tobacco Use     Smoking status: Not on file   Substance and Sexual Activity     Alcohol use: Not on file     Drug use: Not on file     Sexual activity: Not on file   Lifestyle     Physical activity     Days per week: Not on file     Minutes per session: Not on file     Stress: Not on file   Relationships     Social connections     Talks on phone: Not on file     Gets together: Not on file     Attends Islam service: Not on file     Active member of club or organization: Not on file     Attends meetings  of clubs or organizations: Not on file     Relationship status: Not on file     Intimate partner violence     Fear of current or ex partner: Not on file     Emotionally abused: Not on file     Physically abused: Not on file     Forced sexual activity: Not on file   Other Topics Concern     Not on file   Social History Narrative     Not on file      No Known Allergies           Key Medications:       acetaminophen  975 mg Oral Q8H     amLODIPine  2.5 mg Oral Daily     ampicillin-sulbactam (UNASYN) IV  3 g Intravenous Q6H     aspirin  325 mg Oral Daily     ceFAZolin  2 g Intravenous Q8H     chlorhexidine  15 mL Mouth/Throat Q12H     HYDROmorphone  1 mg Intravenous Once     insulin aspart   Subcutaneous TID w/meals     ipratropium - albuterol 0.5 mg/2.5 mg/3 mL  3 mL Nebulization Q4H     lidocaine  1-2 patch Transdermal Q24H     lidocaine   Transdermal Q8H     pantoprazole (PROTONIX) IV  40 mg Intravenous Daily     senna-docusate  1 tablet Oral BID    Or     senna-docusate  2 tablet Oral BID     sodium chloride (PF)  3 mL Intracatheter Q8H       aminocaproic acid (AMICAR) infusion 7.5gm/150mL ADULT 1.25 g/hr (01/09/21 1710)     dextrose       dextrose 5% and 0.45% NaCl + KCl 20 mEq/L 50 mL/hr at 01/10/21 0700     EPINEPHrine Stopped (01/09/21 1709)     insulin (regular)       niCARdipine 0.5 mg/hr (01/10/21 0715)     nitroGLYcerin       phenylephrine 0.5 mcg/kg/min (01/10/21 0715)     propofol (DIPRIVAN) infusion 70 mcg/kg/min (01/10/21 0824)     BETA BLOCKER NOT PRESCRIBED       sodium chloride 20 mL/hr at 01/10/21 0700        Home Meds  No current facility-administered medications on file prior to encounter.        ciprofloxacin-dexamethasone (CIPRODEX) 0.3-0.1 % otic suspension, Place 4 drops into both ears 2 times daily as needed       doxycycline hyclate (VIBRA-TABS) 100 MG tablet, Take 100 mg by mouth 2 times daily as needed (eye infection)       ibuprofen (ADVIL/MOTRIN) 200 MG tablet, Take 200-600 mg by mouth  every 6 hours as needed for mild pain               Physical Examination:   Temp:  [99  F (37.2  C)-101.1  F (38.4  C)] 100.4  F (38  C)  Pulse:  [] 104  Resp:  [7-28] 19  BP: ()/(53-68) 117/64  MAP:  [57 mmHg-136 mmHg] 78 mmHg  Arterial Line BP: ()/() 110/53  FiO2 (%):  [45 %-80 %] 55 %  SpO2:  [92 %-99 %] 94 %    Intake/Output Summary (Last 24 hours) at 1/9/2021 1557  Last data filed at 1/9/2021 1516  Gross per 24 hour   Intake 3580 ml   Output 1920 ml   Net 1660 ml     Wt Readings from Last 4 Encounters:   01/09/21 116.2 kg (256 lb 1.6 oz)   01/07/21 119.2 kg (262 lb 11.2 oz)     Arterial Line BP: ()/() 110/53  MAP:  [57 mmHg-136 mmHg] 78 mmHg  BP - Mean:  [61-78] 78  CVP:  [9 mmHg-20 mmHg] 14 mmHg  SVO2:  [59 %-67 %] 63 %  Ventilation Mode: CMV/AC  (Continuous Mandatory Ventilation/ Assist Control)  FiO2 (%): 55 %  Rate Set (breaths/minute): 18 breaths/min  Tidal Volume Set (mL): 550 mL  PEEP (cm H2O): 5 cmH2O  Oxygen Concentration (%): 55 %  Resp: 19    Recent Labs   Lab 01/10/21  0407 01/09/21  1620   PH 7.38 7.32*   PCO2 42 47*   PO2 65* 66*   HCO3 25 24       GEN: no acute distress   HEENT: head ncat, sclera anicteric, OP patent, trachea midline    PULM: unlabored synchronous with vent, clear anteriorly    CV/COR: tachycardic. Thrill noted.   ABD: soft nontender, hypoactive bowel sounds  EXT:  Warm. IABP in right groin  NEURO: moving all extremities.   SKIN: no obvious rash  LINES: clean, dry intact         Data:   All data and imaging reviewed     ROUTINE ICU LABS (Last four results)  CMP  Recent Labs   Lab 01/10/21  0407 01/09/21  2124 01/09/21  1620 01/09/21  1425 01/08/21  0954    143 141 140 137   POTASSIUM 3.8 4.2 4.3 4.6 4.0   CHLORIDE 112* 113* 112* 109 108   CO2 25 24 24 27 24   ANIONGAP 4 6 5 4 5   * 106* 131* 162* 113*   BUN 20 16 16 14 9   CR 0.89 0.94 0.97 0.92 0.64*   GFRESTIMATED >90 >90 89 >90 >90   GFRESTBLACK >90 >90 >90 >90 >90   JOSE ALBERTO 8.1*  8.1* 8.5 8.4* 9.0   MAG 2.4* 2.4* 2.7*  --   --    PHOS 3.5  --  3.2  --   --    PROTTOTAL 5.8* 5.6* 5.9*  --  7.2   ALBUMIN 3.4 3.3* 3.5  --  3.9   BILITOTAL 0.9 1.1 1.5*  --  1.2   ALKPHOS 42 41 53  --  62   AST 38 40 38  --  23   ALT 25 25 27  --  26     CBC  Recent Labs   Lab 01/10/21  0407 01/09/21  1847 01/09/21  1620 01/09/21  1425   WBC 15.7* 20.1* 21.7* 25.2*   RBC 4.44 4.76 5.03 4.46   HGB 13.2* 14.1 15.0 13.1*   HCT 40.0 42.3 44.7 39.2*   MCV 90 89 89 88   MCH 29.7 29.6 29.8 29.4   MCHC 33.0 33.3 33.6 33.4   RDW 12.8 12.4 12.3 12.3    280 249 226     INR  Recent Labs   Lab 01/10/21  0645 01/09/21  1847 01/09/21  1620 01/09/21  1425   INR 1.26* 1.12 1.14 1.30*     Arterial Blood Gas  Recent Labs   Lab 01/10/21  0407 01/09/21  1620   PH 7.38 7.32*   PCO2 42 47*   PO2 65* 66*   HCO3 25 24       All cultures:  No results for input(s): CULT in the last 168 hours.  Recent Results (from the past 24 hour(s))   XR Chest 2 Views    Narrative    EXAM: CHEST 2 VIEWS  LOCATION: [facility]  DATE/TIME: 1/8/2021 11:55 PM    INDICATION: Preoperative for coronary artery bypass surgery.    COMPARISON: 1/7/2021 at 1234 hours.    FINDINGS: The lungs are clear. Normal size cardiac silhouette.      Impression    IMPRESSION: No evidence of active cardiopulmonary disease.    US Upper Extremity Arterial Duplex Left    Narrative    EXAM: US UPPER EXTREMITY ARTERIAL DUPLEX LEFT  LOCATION: [facility]  DATE/TIME: 1/8/2021 11:05 PM    INDICATION: Left arterial mapping for coronary bypass graft.    COMPARISON: None.    TECHNIQUE: Arterial duplex ultrasound of the left upper extremity. Color flow and spectral Doppler with waveform analysis performed.    FINDINGS: Ultrasound includes evaluation of the brachial artery mid upper arm through the radial and ulnar arteries of the wrist.     LEFT BRACHIAL ARTERY  Above antecubital fossa: 8 mm diameter. Velocity 80 cm/s (triphasic).    LEFT RADIAL  ARTERY  Proximal forearm: 4 mm diameter.  Mid forearm: 3 mm diameter.  Distal forearm: 3 mm diameter.  At wrist: 4 mm diameter. Velocity 90 cm/s (triphasic).    LEFT ULNAR ARTERY  At wrist: Velocity 76 cm/s (triphasic).      Impression    IMPRESSION: Patent brachial, radial, and ulnar arteries from just above antecubital fossa to wrist as described.     US Carotid Bilateral    Narrative    EXAM: ULTRASOUND CAROTID BILATERAL  LOCATION: [facility]  DATE/TIME: 1/8/2021 11:05 PM    INDICATION: Preoperative.  COMPARISON: None.  TECHNIQUE: Duplex exam performed utilizing 2D gray-scale imaging, Doppler interrogation with color-flow and spectral waveform analysis. The percent diameter stenosis is determined using NASCET criteria and Society of Radiologists in Ultrasound Consensus   Criteria.    FINDINGS:    RIGHT: Mild plaque in the distal common carotid artery. The peak systolic velocity in the ICA is less than 125 cm/sec, consistent with less than 50% stenosis. Normal velocities in the ECA. Antegrade flow within the vertebral artery.     LEFT: The peak systolic velocity in the ICA is less than 125 cm/sec, consistent with less than 50% stenosis. Normal velocities in the ECA. Antegrade flow within the vertebral artery.    VELOCITY CHART:  CCA   Right: 110 cm/s   Left: 100 cm/s  ICA   Right: 84 cm/s   Left: 75 cm/s  ECA   Right: 129 cm/s   Left: 106 cm/s  ICA/CCA PSV Ratio   Right: 0.8   Left: 0.8      Impression    IMPRESSION: No evidence of significant stenosis within bilateral carotid arteries.   US Lower Extremity Venous Mapping Bilateral    Narrative    FINAL REPORT    [facility]    DATE: 01/09/2021, 8:26 AM    EXAM: ULTRASOUND VENOUS MAPPING STUDY    INDICATION: Preop CABG.    TECHNIQUE: Ultrasound examination of the bilateral lower extremity veins was performed.    VENOUS DIAMETERS    RIGHT LOWER EXTREMITY VENOUS MEASUREMENTS:  RIGHT GREATER SAPHENOUS VEIN  Upper Thigh: 6.5 mm  Mid Thigh: 5  mm  Lower Thigh: 5 mm  Knee: 5 mm  Upper Calf: 3 mm  Mid Calf: 4 mm  Lower Calf: 4 mm  Ankle: 3 mm    LEFT LOWER EXTREMITY VENOUS MEASUREMENTS:  LEFT GREATER SAPHENOUS VEIN  Upper Thigh: 8 mm  Mid Thigh: 7 mm  Lower Thigh: 6 mm  Knee: 7 mm  Upper Calf: 4 mm  Mid Calf: 4 mm  Lower Calf: 5 mm  Ankle: 4 mm        Impression    IMPRESSION:  Bilateral lower extremity venous mapping, as described.    Final Interpretation Dictated By: Tesfaye Dodd MD  Date: 01/09/2021       PRELIMINARY REPORT - Final read in the a.m.    EXAM: ULTRASOUND LOWER EXTREMITIES VENOUS MAPPING BILATERAL  LOCATION: [facility]  DATE/TIME: 1/8/2021 11:05 PM    IMPRESSION: The great saphenous veins are patent bilaterally. The measurements of the venous diameters are detailed on the ultrasound technologist's worksheet.    Preliminary Interpretation Dictated By: Markell Appiah MD  Date: 1/9/2021     Cardiac Catheterization    Narrative    Successful insertion of an intra-aortic balloon pump.          Billing: This patient is critically ill: Yes. Total critical care time today 35 min.

## 2021-01-10 NOTE — PROGRESS NOTES
Patient seen and discussed with Dr. Ruslan Meléndez [facility]  Cardiovascular and Thoracic Surgery Daily Note          Assessment and Plan:   POD#1 s/p Coronary artery bypass grafting x 2 (radial artery graft to the obtuse marginal branch of the left circumflex coronary artery and pedicled left internal mammary artery to left anterior descending coronary artery), Endoscopic left radial artery harvest by Dr. Jacky Rojas  -[facility]: HR: 80s-110s. SBP: 80s-130s. Pre op EF: 50-55%. IABP removed this am without issue, good control of groin site with femstop. Remains on Albert and Nicardipine. Nicardipine transitioned to amlodipine 2.5mg PO this am. Wean Albert as able. ASA, BB on hold while on pressors, statin resumed. Pacer wires capped. Chest tubes to suction.   -Resp: Remains intubated. CXR with new left lower lobe consolidation and hypoxia, plan for bronch today with intensivist. Concern for aspiration pneumonia and started on abx. Ventilator management per intensivist.  -Neuro:  Sedated with propofol.   -Renal: good UO, no weight this am. Will hold diuretics today and continue to monitor.   Creatinine   Date Value Ref Range Status   01/10/2021 0.89 0.66 - 1.25 mg/dL Final   ]  -GI: NPO while intubated. No BM. Continue bowel regimen  -:  Cr in place, d/t limited mobility and need for accurate I&Os.   -Endo: pre op a1c: 5.5. Has not needed insulin gtt since surgery. Will continue to monitor and transition to ssi if needed.   -FEN: replete lytes as needed, ADAT. Na: 141. K: 3.8  Orders Placed This Encounter      NPO for Medical/Clinical Reasons Except for: Meds, Ice Chips      Advance Diet as Tolerated: Clear Liquid Diet    -ID: Temp (24hrs), Av.5  F (38.1  C), Min:99  F (37.2  C), Max:101.1  F (38.4  C)  Completing perioperative abx. Started on unasyn for concern for aspiration pneumonia d/t new left lower lobe consolidation and increased hypoxia.   WBC   Date Value Ref Range Status   01/10/2021 15.7  (H) 4.0 - 11.0 10e9/L Final   ]  -Heme: plt: 244. Acute blood loss anemia and thrombocytopenia related to surgery.   Hemoglobin   Date Value Ref Range Status   01/10/2021 13.2 (L) 13.3 - 17.7 g/dL Final   ],   -Proph: PCD, ASA, statin, PPI, sub q heparin  -Dispo: Continue ICU cares. Bronch and ventilator management per intensivist team. Initiate therapies when appropriate. Continue to encourage IS, cough, deep breathing, ambulation.           Interval History:   Remains intubated and sedated. Weaning pressors as able. Increased FiO2 needs overnight, new left lower lobe consolidation on CXR, concern for aspiration pneumonia.           Medications:       acetaminophen  975 mg Oral Q8H     amLODIPine  2.5 mg Oral Daily     ampicillin-sulbactam (UNASYN) IV  3 g Intravenous Q6H     aspirin  325 mg Oral Daily     ceFAZolin  2 g Intravenous Q8H     chlorhexidine  15 mL Mouth/Throat Q12H     HYDROmorphone  1 mg Intravenous Once     insulin aspart   Subcutaneous TID w/meals     ipratropium - albuterol 0.5 mg/2.5 mg/3 mL  3 mL Nebulization Q4H     lidocaine  1-2 patch Transdermal Q24H     lidocaine   Transdermal Q8H     pantoprazole (PROTONIX) IV  40 mg Intravenous Daily     senna-docusate  1 tablet Oral BID    Or     senna-docusate  2 tablet Oral BID     sodium chloride (PF)  3 mL Intracatheter Q8H     [START ON 1/12/2021] acetaminophen, dextrose, glucose **OR** dextrose **OR** glucagon, hydrALAZINE, HYDROmorphone, hydrOXYzine, insulin aspart, lidocaine 4%, lidocaine (buffered or not buffered), meperidine, methocarbamol, naloxone **OR** naloxone **OR** naloxone **OR** naloxone, ondansetron **OR** ondansetron, oxyCODONE, BETA BLOCKER NOT PRESCRIBED, sodium chloride (PF)          Physical Exam:   Vitals were reviewed  Blood pressure 117/64, pulse 103, temperature 100.4  F (38  C), resp. rate 24, weight 116.2 kg (256 lb 1.6 oz), SpO2 94 %.  Rhythm: sinus tach    Lungs: diminished bases    Cardiovascular: sinus tach, normal s1  and s2    Abdomen: soft NTND    Extremeties: minimal edema    Incision: CDI    CT: to suction    Weight:   Vitals:    01/09/21 0347   Weight: 116.2 kg (256 lb 1.6 oz)            Data:   Labs:   Lab Results   Component Value Date    WBC 15.7 01/10/2021     Lab Results   Component Value Date    RBC 4.44 01/10/2021     Lab Results   Component Value Date    HGB 13.2 01/10/2021     Lab Results   Component Value Date    HCT 40.0 01/10/2021     No components found for: MCT  Lab Results   Component Value Date    MCV 90 01/10/2021     Lab Results   Component Value Date    MCH 29.7 01/10/2021     Lab Results   Component Value Date    MCHC 33.0 01/10/2021     Lab Results   Component Value Date    RDW 12.8 01/10/2021     Lab Results   Component Value Date     01/10/2021       Last Basic Metabolic Panel:  Lab Results   Component Value Date     01/10/2021      Lab Results   Component Value Date    POTASSIUM 3.8 01/10/2021     Lab Results   Component Value Date    CHLORIDE 112 01/10/2021     Lab Results   Component Value Date    JOSE ALBERTO 8.1 01/10/2021     Lab Results   Component Value Date    CO2 25 01/10/2021     Lab Results   Component Value Date    BUN 20 01/10/2021     Lab Results   Component Value Date    CR 0.89 01/10/2021     Lab Results   Component Value Date     01/10/2021       CXR: 1/10/21    IMPRESSION: Endotracheal tube, enteric tube, [place]-Meh[place] catheter, mediastinal drain left chest tube. Cardiomediastinal enlargement. Atelectasis or infiltrate left lung base and small left effusion. Similar to prior.    Edna Beal PA-C  CV Surgery  Pager #[phone]

## 2021-01-10 NOTE — PROGRESS NOTES
Pt extubated to 7L oxymask per MD order.  LS are diminished t/o and no stridor noted.  Will continue to follow  Angelito Jaffe, RT  1/10/2021

## 2021-01-10 NOTE — PROGRESS NOTES
Hospitalist Chart Check    Patient s/p CABG x 2 on 1/9/21. Currently intubated in ICU. Hospitalist service will sign off at this time.

## 2021-01-10 NOTE — PROVIDER NOTIFICATION
Pt with sudden rise in HR, agitation and chest tube noise. MD called to bedside to evaluate.   STAT CXR done. MD at bedside to evaluate. Will call [facility] with update once labs back. csccrn

## 2021-01-10 NOTE — PROGRESS NOTES
Weaning propofol as tolerated. Albert and Nicardipine gtts remain. Updated wife x2.   Isolated run of 6 beats v-tac, followed by bigeminy then self resolved and back to SR. Lytes cked and all WNL.  No change in C/O or C/I.   CT output has decreased following protamine. Total of 450 ml in canister.   csccrn

## 2021-01-10 NOTE — PROGRESS NOTES
[facility]    Cardiology Progress Note     Assessment & Plan    Patient is now status post CABG.  He has been doing well hemodynamically.  There is some concern she may have a left lower lobe pneumonia.  This is being evaluated by CV surgery.  Balloon pump was removed by CV surgery team.  This was a chart review only.  I did visit with the patient's nurse and entered the patient's room.  He has a FemoStop in place and excellent control of his groin site.  Cardiology will sign off.  Please call cardiology with any questions or concerns anytime.  We are very happy to assist in the care of the patient.

## 2021-01-10 NOTE — OP NOTE
DATE OF SERVICE: 1/9/2021.     PREOPERATIVE DIAGNOSES:  1.  Severe multivessel coronary artery disease.  2.  Unstable angina.     POSTOPERATIVE DIAGNOSES:  1.  Severe multivessel coronary artery disease.  2.  Unstable angina.     PROCEDURE PERFORMED:  1.  Coronary artery bypass grafting x 2 (radial artery graft to the obtuse marginal branch of the left circumflex coronary artery and pedicled left internal mammary artery to left anterior descending coronary artery).  2.  Endoscopic left radial artery harvest.     SURGEON:  Jacky Rojas MD, PhD.     FIRST ASSISTANT:  Edna Beal PA-C.     ANESTHESIA:  General endotracheal anesthesia.     ANESTHESIOLOGIST:  Ruslan Lemons MD.     ESTIMATED BLOOD LOSS:  500 mL.     SPECIMEN:  None.     INDICATIONS FOR PROCEDURE: Mr. Monson is a 50-year-old man who presents with chest pain.  Coronary angiography demonstrates severe distal left main coronary artery disease. Echocardiography demonstrates overall preserved left ventricular function (although there is anterior hypokinesis) and no significant valvular abnormality. The patient understands the risks and benefits of the procedure and wishes to undergo the operation.     OPERATIVE FINDINGS:  The left internal mammary artery was 2 mm in diameter and had excellent flow.  The left radial artery was 3 mm in diameter and suitable for conduit.  The ascending aorta was free of calcified plaque. The obtuse marginal branch of the left circumflex coronary artery was 2 mm in diameter and free of disease at the site of anastomosis. The left anterior descending coronary artery 2 mm in diameter and diffusely diseased.  After reperfusion and defibrillation, sinus rhythm resumed.  Left ventricular function was normal preoperatively and unchanged after bypass without inotropic support.     OPERATIVE DESCRIPTION IN DETAIL:  After obtaining informed consent, the patient was brought to the operating room and placed in the supine position  on the operating room table.  Appropriate lines and devices for monitoring were placed by the anesthesia team.  The patient underwent smooth induction of general anesthesia, and the trachea was intubated orally.  A right internal jugular Cordis introducer was placed, and a [place] catheter was placed through this.  The patient was prepped and draped, and a timeout was performed to confirm the correct patient identity, as well as the procedure to be performed.  A median sternotomy was performed and the left internal mammary artery was harvested.  The left radial artery was harvested endoscopically by the physician assistant, Edna Beal PA-C.     The patient was given full dose heparin, and after cannulation of the distal ascending aorta and the right atrium, cardiopulmonary bypass was commenced.  Antegrade and retrograde cardioplegia cannulae were placed, and the heart was arrested with 1 liter of cold antegrade blood cardioplegia.  Subsequent maintenance doses of 300 mL of cold retrograde blood cardioplegia were given approximately every 20 minutes or after each distal anastomosis.  Topical cold saline slush was applied for additional protection.     The radial artery graft was sewn to the mid obtuse marginal branch of the left circumflex coronary artery in end-to-side fashion using running 7-0 Prolene. The pedicled left internal mammary artery was sewn to the mid left anterior descending coronary artery in end-to-side fashion using running 8-0 Prolene.  The proximal anastomoses of the radial artery graft was constructed on the ascending aorta in end-to-side fashion using running 6-0 Prolene under a single crossclamp.  The crossclamp was released, and the heart was resuscitated.       All bleeding points were controlled.  A bipolar ventricular pacing lead was placed and brought out through a separate stab incision. A bipolar right atrial pacing lead was placed and brought out through a separate stab incision.   The patient weaned from cardiopulmonary bypass, was given protamine and decannulated.  A 24-Cymraes Saqib drain was placed in the left pleural space and brought out through a separate stab incision.  A 24-Cymraes Saqib drain was placed in the mediastinum and brought out through a separate stab incision. A 28-Cymraes chest tube was placed in the anterior mediastinum and brought out through a separate stab incision.  The sternal edges were reapposed with 3 interrupted #6 stainless steel sternal wires in the manubrium, 3 double wires in the body of the sternum and 1 additional #6 stainless steel sternal wire at the lower aspect of the sternum.  The muscle, fascia, and skin were closed in layers with absorbable suture.  Dermabond was applied.  All sponge, needle and instrument counts were correct at the end of the case.          JOHANNY GARDUNO MD

## 2021-01-10 NOTE — PROCEDURES
Procedure:   Bronchoscopy with airway inspection        Indication:   Consolidation in LLL        Consent:   Obtained from the family.   Risks, benefits and alternatives discussed. Risks include bleeding, arrhythmia and pneumothorax.  The patient is in agreement to proceed with the procedure.       Pre-medication:   The patient is on mechanical ventilation and on drips of propofol at 75 mcg/kg/min  Lidocaine 1%: 5 mL through the ETT        Procedure Summary:   Time out was performed.   The bronchoscope inserted through the ETT     Exam of trachea and bronchus of the right and left bronchial tree to the sub-segmental level revealed no endobronchial lesion. No mucus or secretions. Airways were clear.           Complications:   No immediate complications.    This procedure is performed by Sabas Kaminski MD

## 2021-01-10 NOTE — CONSULTS
CARDIAC SURGERY NUTRITION CONSULT    Received standing order to assess and educate patient.    Will follow and complete assessment once patient is extubated and/or is transferred to medical unit.    Patient will receive nutrition education during the Outpatient Cardiac Rehab Program (nutrition classes/dietitian counseling).    Azucena Atkinson RD, LD, [facility]   Clinical Dietitian - [facility]

## 2021-01-11 ENCOUNTER — APPOINTMENT (OUTPATIENT)
Dept: SPEECH THERAPY | Facility: CLINIC | Age: 51
DRG: 235 | End: 2021-01-11
Attending: INTERNAL MEDICINE
Payer: COMMERCIAL

## 2021-01-11 ENCOUNTER — APPOINTMENT (OUTPATIENT)
Dept: PHYSICAL THERAPY | Facility: CLINIC | Age: 51
DRG: 235 | End: 2021-01-11
Attending: PHYSICIAN ASSISTANT
Payer: COMMERCIAL

## 2021-01-11 LAB
ANION GAP SERPL CALCULATED.3IONS-SCNC: 3 MMOL/L (ref 3–14)
BASE DEFICIT BLDA-SCNC: 2.2 MMOL/L
BASE DEFICIT BLDA-SCNC: 2.6 MMOL/L
BASE DEFICIT BLDA-SCNC: 3.1 MMOL/L
BASE DEFICIT BLDA-SCNC: 3.5 MMOL/L
BASE DEFICIT BLDA-SCNC: 3.9 MMOL/L
BASE DEFICIT BLDV-SCNC: 1.9 MMOL/L
BASE EXCESS BLDA CALC-SCNC: 0.9 MMOL/L
BUN SERPL-MCNC: 14 MG/DL (ref 7–30)
CA-I BLD-MCNC: 4.2 MG/DL (ref 4.4–5.2)
CA-I BLD-MCNC: 4.2 MG/DL (ref 4.4–5.2)
CA-I BLD-MCNC: 4.3 MG/DL (ref 4.4–5.2)
CA-I BLD-MCNC: 4.8 MG/DL (ref 4.4–5.2)
CA-I BLD-MCNC: 4.9 MG/DL (ref 4.4–5.2)
CALCIUM SERPL-MCNC: 8.4 MG/DL (ref 8.5–10.1)
CHLORIDE SERPL-SCNC: 109 MMOL/L (ref 94–109)
CO2 SERPL-SCNC: 28 MMOL/L (ref 20–32)
CREAT SERPL-MCNC: 0.58 MG/DL (ref 0.66–1.25)
ERYTHROCYTE [DISTWIDTH] IN BLOOD BY AUTOMATED COUNT: 12.9 % (ref 10–15)
GFR SERPL CREATININE-BSD FRML MDRD: >90 ML/MIN/{1.73_M2}
GLUCOSE BLD-MCNC: 106 MG/DL (ref 70–99)
GLUCOSE BLD-MCNC: 116 MG/DL (ref 70–99)
GLUCOSE BLD-MCNC: 119 MG/DL (ref 70–99)
GLUCOSE BLD-MCNC: 123 MG/DL (ref 70–99)
GLUCOSE BLD-MCNC: 148 MG/DL (ref 70–99)
GLUCOSE BLD-MCNC: 158 MG/DL (ref 70–99)
GLUCOSE BLD-MCNC: 162 MG/DL (ref 70–99)
GLUCOSE BLDC GLUCOMTR-MCNC: 116 MG/DL (ref 70–99)
GLUCOSE BLDC GLUCOMTR-MCNC: 118 MG/DL (ref 70–99)
GLUCOSE BLDC GLUCOMTR-MCNC: 141 MG/DL (ref 70–99)
GLUCOSE SERPL-MCNC: 144 MG/DL (ref 70–99)
HCO3 BLD-SCNC: 22 MMOL/L (ref 21–28)
HCO3 BLD-SCNC: 23 MMOL/L (ref 21–28)
HCO3 BLD-SCNC: 24 MMOL/L (ref 21–28)
HCO3 BLD-SCNC: 28 MMOL/L (ref 21–28)
HCO3 BLDV-SCNC: 25 MMOL/L (ref 21–28)
HCT VFR BLD AUTO: 34.8 % (ref 40–53)
HGB BLD-MCNC: 11.5 G/DL (ref 13.3–17.7)
HGB BLD-MCNC: 12.8 G/DL (ref 13.3–17.7)
HGB BLD-MCNC: 13 G/DL (ref 13.3–17.7)
HGB BLD-MCNC: 13.1 G/DL (ref 13.3–17.7)
HGB BLD-MCNC: 13.3 G/DL (ref 13.3–17.7)
HGB BLD-MCNC: 13.3 G/DL (ref 13.3–17.7)
HGB BLD-MCNC: 13.8 G/DL (ref 13.3–17.7)
HGB BLD-MCNC: 15.8 G/DL (ref 13.3–17.7)
LACTATE BLD-SCNC: 0.6 MMOL/L (ref 0.7–2)
LACTATE BLD-SCNC: 0.8 MMOL/L (ref 0.7–2)
LACTATE BLD-SCNC: 0.9 MMOL/L (ref 0.7–2)
LACTATE BLD-SCNC: 1.2 MMOL/L (ref 0.7–2)
LACTATE BLD-SCNC: 1.3 MMOL/L (ref 0.7–2)
MAGNESIUM SERPL-MCNC: 2.3 MG/DL (ref 1.6–2.3)
MCH RBC QN AUTO: 29.8 PG (ref 26.5–33)
MCHC RBC AUTO-ENTMCNC: 33 G/DL (ref 31.5–36.5)
MCV RBC AUTO: 90 FL (ref 78–100)
O2/TOTAL GAS SETTING VFR VENT: 100 %
O2/TOTAL GAS SETTING VFR VENT: 100 %
O2/TOTAL GAS SETTING VFR VENT: 80 %
O2/TOTAL GAS SETTING VFR VENT: 90 %
O2/TOTAL GAS SETTING VFR VENT: 90 %
OXYHGB MFR BLD: 100 % (ref 92–100)
OXYHGB MFR BLD: 100 % (ref 92–100)
OXYHGB MFR BLD: 77 % (ref 92–100)
OXYHGB MFR BLD: 98 % (ref 92–100)
OXYHGB MFR BLD: >100 % (ref 92–100)
PCO2 BLD: 42 MM HG (ref 35–45)
PCO2 BLD: 46 MM HG (ref 35–45)
PCO2 BLD: 47 MM HG (ref 35–45)
PCO2 BLD: 47 MM HG (ref 35–45)
PCO2 BLD: 48 MM HG (ref 35–45)
PCO2 BLD: 52 MM HG (ref 35–45)
PCO2 BLDV: 53 MM HG (ref 40–50)
PH BLD: 7.29 PH (ref 7.35–7.45)
PH BLD: 7.3 PH (ref 7.35–7.45)
PH BLD: 7.32 PH (ref 7.35–7.45)
PH BLD: 7.32 PH (ref 7.35–7.45)
PH BLD: 7.34 PH (ref 7.35–7.45)
PH BLD: 7.34 PH (ref 7.35–7.45)
PH BLDV: 7.29 PH (ref 7.32–7.43)
PLATELET # BLD AUTO: 137 10E9/L (ref 150–450)
PO2 BLD: 102 MM HG (ref 80–105)
PO2 BLD: 261 MM HG (ref 80–105)
PO2 BLD: 285 MM HG (ref 80–105)
PO2 BLD: 294 MM HG (ref 80–105)
PO2 BLD: 295 MM HG (ref 80–105)
PO2 BLD: 371 MM HG (ref 80–105)
PO2 BLDV: 45 MM HG (ref 25–47)
POTASSIUM BLD-SCNC: 4 MMOL/L (ref 3.4–5.3)
POTASSIUM BLD-SCNC: 4 MMOL/L (ref 3.4–5.3)
POTASSIUM BLD-SCNC: 4.6 MMOL/L (ref 3.4–5.3)
POTASSIUM BLD-SCNC: 5 MMOL/L (ref 3.4–5.3)
POTASSIUM BLD-SCNC: 5 MMOL/L (ref 3.4–5.3)
POTASSIUM BLD-SCNC: 5.1 MMOL/L (ref 3.4–5.3)
POTASSIUM BLD-SCNC: 5.2 MMOL/L (ref 3.4–5.3)
POTASSIUM SERPL-SCNC: 4 MMOL/L (ref 3.4–5.3)
RBC # BLD AUTO: 3.86 10E12/L (ref 4.4–5.9)
SODIUM BLD-SCNC: 137 MMOL/L (ref 133–144)
SODIUM BLD-SCNC: 138 MMOL/L (ref 133–144)
SODIUM BLD-SCNC: 139 MMOL/L (ref 133–144)
SODIUM BLD-SCNC: 139 MMOL/L (ref 133–144)
SODIUM BLD-SCNC: 140 MMOL/L (ref 133–144)
SODIUM SERPL-SCNC: 140 MMOL/L (ref 133–144)
WBC # BLD AUTO: 12.1 10E9/L (ref 4–11)

## 2021-01-11 PROCEDURE — 250N000011 HC RX IP 250 OP 636

## 2021-01-11 PROCEDURE — 250N000013 HC RX MED GY IP 250 OP 250 PS 637: Performed by: PHYSICIAN ASSISTANT

## 2021-01-11 PROCEDURE — 97161 PT EVAL LOW COMPLEX 20 MIN: CPT | Mod: GP

## 2021-01-11 PROCEDURE — 120N000001 HC R&B MED SURG/OB

## 2021-01-11 PROCEDURE — 83735 ASSAY OF MAGNESIUM: CPT

## 2021-01-11 PROCEDURE — 250N000011 HC RX IP 250 OP 636: Performed by: PHYSICIAN ASSISTANT

## 2021-01-11 PROCEDURE — 97530 THERAPEUTIC ACTIVITIES: CPT | Mod: GP

## 2021-01-11 PROCEDURE — 85027 COMPLETE CBC AUTOMATED: CPT

## 2021-01-11 PROCEDURE — 999N001017 HC STATISTIC GLUCOSE BY METER IP

## 2021-01-11 PROCEDURE — 250N000012 HC RX MED GY IP 250 OP 636 PS 637: Performed by: PHYSICIAN ASSISTANT

## 2021-01-11 PROCEDURE — 80048 BASIC METABOLIC PNL TOTAL CA: CPT

## 2021-01-11 PROCEDURE — 92526 ORAL FUNCTION THERAPY: CPT | Mod: GN | Performed by: SPEECH-LANGUAGE PATHOLOGIST

## 2021-01-11 PROCEDURE — 92610 EVALUATE SWALLOWING FUNCTION: CPT | Mod: GN | Performed by: SPEECH-LANGUAGE PATHOLOGIST

## 2021-01-11 PROCEDURE — 97110 THERAPEUTIC EXERCISES: CPT | Mod: GP

## 2021-01-11 RX ORDER — CARBOXYMETHYLCELLULOSE SODIUM 5 MG/ML
2 SOLUTION/ DROPS OPHTHALMIC
Status: DISCONTINUED | OUTPATIENT
Start: 2021-01-11 | End: 2021-01-14 | Stop reason: HOSPADM

## 2021-01-11 RX ORDER — FUROSEMIDE 10 MG/ML
20 INJECTION INTRAMUSCULAR; INTRAVENOUS ONCE
Status: COMPLETED | OUTPATIENT
Start: 2021-01-11 | End: 2021-01-11

## 2021-01-11 RX ORDER — IPRATROPIUM BROMIDE AND ALBUTEROL SULFATE 2.5; .5 MG/3ML; MG/3ML
3 SOLUTION RESPIRATORY (INHALATION) EVERY 4 HOURS PRN
Status: DISCONTINUED | OUTPATIENT
Start: 2021-01-11 | End: 2021-01-14 | Stop reason: HOSPADM

## 2021-01-11 RX ORDER — METOPROLOL TARTRATE 25 MG/1
25 TABLET, FILM COATED ORAL 2 TIMES DAILY
Status: DISCONTINUED | OUTPATIENT
Start: 2021-01-11 | End: 2021-01-13

## 2021-01-11 RX ORDER — PANTOPRAZOLE SODIUM 40 MG/1
40 TABLET, DELAYED RELEASE ORAL
Status: DISCONTINUED | OUTPATIENT
Start: 2021-01-11 | End: 2021-01-14 | Stop reason: HOSPADM

## 2021-01-11 RX ADMIN — HYDROMORPHONE HYDROCHLORIDE 0.5 MG: 1 INJECTION, SOLUTION INTRAMUSCULAR; INTRAVENOUS; SUBCUTANEOUS at 14:22

## 2021-01-11 RX ADMIN — AMPICILLIN AND SULBACTAM 3 G: 2; 1 INJECTION, POWDER, FOR SOLUTION INTRAMUSCULAR; INTRAVENOUS at 21:20

## 2021-01-11 RX ADMIN — HEPARIN SODIUM 5000 UNITS: 5000 INJECTION, SOLUTION INTRAVENOUS; SUBCUTANEOUS at 12:11

## 2021-01-11 RX ADMIN — ASPIRIN 325 MG: 325 TABLET, COATED ORAL at 08:49

## 2021-01-11 RX ADMIN — OXYCODONE HYDROCHLORIDE 10 MG: 5 TABLET ORAL at 17:59

## 2021-01-11 RX ADMIN — OXYCODONE HYDROCHLORIDE 10 MG: 5 TABLET ORAL at 15:01

## 2021-01-11 RX ADMIN — OXYCODONE HYDROCHLORIDE 10 MG: 5 TABLET ORAL at 21:19

## 2021-01-11 RX ADMIN — DOCUSATE SODIUM 50 MG AND SENNOSIDES 8.6 MG 2 TABLET: 8.6; 5 TABLET, FILM COATED ORAL at 08:49

## 2021-01-11 RX ADMIN — HYDROMORPHONE HYDROCHLORIDE 0.5 MG: 1 INJECTION, SOLUTION INTRAMUSCULAR; INTRAVENOUS; SUBCUTANEOUS at 09:14

## 2021-01-11 RX ADMIN — AMPICILLIN AND SULBACTAM 3 G: 2; 1 INJECTION, POWDER, FOR SOLUTION INTRAMUSCULAR; INTRAVENOUS at 14:05

## 2021-01-11 RX ADMIN — METOPROLOL TARTRATE 25 MG: 25 TABLET, FILM COATED ORAL at 21:19

## 2021-01-11 RX ADMIN — HYDROMORPHONE HYDROCHLORIDE 0.5 MG: 1 INJECTION, SOLUTION INTRAMUSCULAR; INTRAVENOUS; SUBCUTANEOUS at 06:45

## 2021-01-11 RX ADMIN — PANTOPRAZOLE SODIUM 40 MG: 40 TABLET, DELAYED RELEASE ORAL at 09:05

## 2021-01-11 RX ADMIN — ROSUVASTATIN CALCIUM 20 MG: 20 TABLET, FILM COATED ORAL at 08:49

## 2021-01-11 RX ADMIN — METHOCARBAMOL TABLETS 750 MG: 750 TABLET, COATED ORAL at 16:23

## 2021-01-11 RX ADMIN — LIDOCAINE 2 PATCH: 560 PATCH PERCUTANEOUS; TOPICAL; TRANSDERMAL at 08:58

## 2021-01-11 RX ADMIN — HEPARIN SODIUM 5000 UNITS: 5000 INJECTION, SOLUTION INTRAVENOUS; SUBCUTANEOUS at 21:19

## 2021-01-11 RX ADMIN — HYDROMORPHONE HYDROCHLORIDE 0.5 MG: 1 INJECTION, SOLUTION INTRAMUSCULAR; INTRAVENOUS; SUBCUTANEOUS at 04:48

## 2021-01-11 RX ADMIN — AMPICILLIN AND SULBACTAM 3 G: 2; 1 INJECTION, POWDER, FOR SOLUTION INTRAMUSCULAR; INTRAVENOUS at 02:05

## 2021-01-11 RX ADMIN — ACETAMINOPHEN 975 MG: 325 TABLET, FILM COATED ORAL at 04:48

## 2021-01-11 RX ADMIN — ACETAMINOPHEN 975 MG: 325 TABLET, FILM COATED ORAL at 21:19

## 2021-01-11 RX ADMIN — METOPROLOL TARTRATE 25 MG: 25 TABLET, FILM COATED ORAL at 09:06

## 2021-01-11 RX ADMIN — ACETAMINOPHEN 975 MG: 325 TABLET, FILM COATED ORAL at 12:11

## 2021-01-11 RX ADMIN — DOCUSATE SODIUM 50 MG AND SENNOSIDES 8.6 MG 2 TABLET: 8.6; 5 TABLET, FILM COATED ORAL at 21:19

## 2021-01-11 RX ADMIN — OXYCODONE HYDROCHLORIDE 10 MG: 5 TABLET ORAL at 06:11

## 2021-01-11 RX ADMIN — OXYCODONE HYDROCHLORIDE 10 MG: 5 TABLET ORAL at 11:17

## 2021-01-11 RX ADMIN — HYDROMORPHONE HYDROCHLORIDE 0.5 MG: 1 INJECTION, SOLUTION INTRAMUSCULAR; INTRAVENOUS; SUBCUTANEOUS at 02:05

## 2021-01-11 RX ADMIN — AMPICILLIN AND SULBACTAM 3 G: 2; 1 INJECTION, POWDER, FOR SOLUTION INTRAMUSCULAR; INTRAVENOUS at 08:48

## 2021-01-11 RX ADMIN — AMLODIPINE BESYLATE 2.5 MG: 2.5 TABLET ORAL at 08:49

## 2021-01-11 RX ADMIN — FUROSEMIDE 20 MG: 10 INJECTION, SOLUTION INTRAVENOUS at 09:10

## 2021-01-11 RX ADMIN — INSULIN ASPART 1 UNITS: 100 INJECTION, SOLUTION INTRAVENOUS; SUBCUTANEOUS at 11:22

## 2021-01-11 RX ADMIN — OXYCODONE HYDROCHLORIDE 10 MG: 5 TABLET ORAL at 02:48

## 2021-01-11 ASSESSMENT — ACTIVITIES OF DAILY LIVING (ADL)
ADLS_ACUITY_SCORE: 20

## 2021-01-11 NOTE — PROGRESS NOTES
Patient seen and discussed with Dr. Adan Meléndez [facility]  Cardiovascular and Thoracic Surgery Daily Note          Assessment and Plan:   POD#2 s/p Coronary artery bypass grafting x 2 (radial artery graft to the obtuse marginal branch of the left circumflex coronary artery and pedicled left internal mammary artery to left anterior descending coronary artery), Endoscopic left radial artery harvest by Dr. Jacky Rojas  -CVS: HR: 90s-100s, SBP: 90s-130s. Pre op: 50-55%. IABP removed on POD#1 without issue. Weaned off gtts. ASA, BB, statin. Amlodipine 2.5mg to prevent radial artery spasm. SVT vs A fib overnight- resolved with IV metoprolol-- will start oral BB today and continue to monitor rhythm. ASA, BB, statin. Pacer wires capped. Chest tubes with too much output to remove today.   -Resp: Extubated on POD#1 at 1725. Saturating well on 2-3L. Weaning as able. Continue to encourage IS, cough, deep breathing, ambulation.   -Neuro:  Grossly intact. Pain controlled.   -Renal: good UOP. Up about 2kg from preoperative weight. Cr: 0.58. Will give one dose IV lasix today and monitor response.   -GI:  Plan to advance diet today. No BM. Continue bowel regimen.   -:  Voiding well on own.   -Endo: pre op a1c: 5.5. blood sugars remained stable without insulin gtt. Will transition to sliding scale insulin and continue to monitor.   -FEN: replace electrolytes as needed. Na: 140. K: 4.0  Orders Placed This Encounter      Advance Diet as Tolerated: Clear Liquid Diet    -ID: Temp (24hrs), Av.9  F (37.7  C), Min:98.2  F (36.8  C), Max:100.9  F (38.3  C)  WBC: 12.1. Completed perioperative abx. Started on unasyn for concern for aspiration pneumonia d/t new left lower lobe consolidation and increased hypoxia. Leukcytosis resolving.   -Heme: Hgb: 11.5. plt: 215. Acute blood loss anemia and thrombocytopenia related to surgery  -Proph: PCD, ASA, BB, statin, PPI, sub q heparin  -Dispo: Transfer to [facility]. Initiate  therapies. Continue to encourage IS, cough, deep breathing, ambulation. Chest tubes and pacer wires to remain in today.           Interval History:   Extubated and weaned off pressors. SVT vs A fib overnight, treated with IV metoprolol and lasix. HR improved. Saturating well on 2L. Pain controlled. Plan to advance diet today. No BM.          Medications:       acetaminophen  975 mg Oral Q8H     amiodarone  150 mg Intravenous Once     amLODIPine  2.5 mg Oral Daily     ampicillin-sulbactam (UNASYN) IV  3 g Intravenous Q6H     aspirin  325 mg Oral Daily     heparin ANTICOAGULANT  5,000 Units Subcutaneous Q8H     HYDROmorphone  1 mg Intravenous Once     insulin aspart   Subcutaneous TID w/meals     lidocaine  1-2 patch Transdermal Q24H     lidocaine   Transdermal Q8H     pantoprazole (PROTONIX) IV  40 mg Intravenous Daily     rosuvastatin  20 mg Oral Daily     senna-docusate  1 tablet Oral BID    Or     senna-docusate  2 tablet Oral BID     sodium chloride (PF)  3 mL Intracatheter Q8H     [START ON 1/12/2021] acetaminophen, artificial tears ophthalmic solution, dextrose, glucose **OR** dextrose **OR** glucagon, hydrALAZINE, HYDROmorphone, hydrOXYzine, insulin aspart, ipratropium - albuterol 0.5 mg/2.5 mg/3 mL, lidocaine 4%, lidocaine (buffered or not buffered), meperidine, methocarbamol, naloxone **OR** naloxone **OR** naloxone **OR** naloxone, ondansetron **OR** ondansetron, oxyCODONE, BETA BLOCKER NOT PRESCRIBED, sodium chloride (PF)          Physical Exam:   Vitals were reviewed  Blood pressure 132/80, pulse 105, temperature 100.6  F (38.1  C), resp. rate 18, weight 117.8 kg (259 lb 11.2 oz), SpO2 94 %.  Rhythm: sinus tach    Lungs: diminished bases    Cardiovascular: RRR normal s1 and s2    Abdomen: soft NTND    Extremeties: minimal edema    Incision: CDI    CT: to suction    Weight:   Vitals:    01/09/21 0347 01/11/21 0615   Weight: 116.2 kg (256 lb 1.6 oz) 117.8 kg (259 lb 11.2 oz)            Data:   Labs:   Lab  Results   Component Value Date    WBC 12.1 01/11/2021     Lab Results   Component Value Date    RBC 3.86 01/11/2021     Lab Results   Component Value Date    HGB 11.5 01/11/2021     Lab Results   Component Value Date    HCT 34.8 01/11/2021     No components found for: MCT  Lab Results   Component Value Date    MCV 90 01/11/2021     Lab Results   Component Value Date    MCH 29.8 01/11/2021     Lab Results   Component Value Date    MCHC 33.0 01/11/2021     Lab Results   Component Value Date    RDW 12.9 01/11/2021     Lab Results   Component Value Date     01/11/2021       Last Basic Metabolic Panel:  Lab Results   Component Value Date     01/11/2021      Lab Results   Component Value Date    POTASSIUM 4.0 01/11/2021     Lab Results   Component Value Date    CHLORIDE 109 01/11/2021     Lab Results   Component Value Date    JOSE ALBERTO 8.4 01/11/2021     Lab Results   Component Value Date    CO2 28 01/11/2021     Lab Results   Component Value Date    BUN 14 01/11/2021     Lab Results   Component Value Date    CR 0.58 01/11/2021     Lab Results   Component Value Date     01/11/2021       CXR: 1/10/21    IMPRESSION: Endotracheal tube tip 4 cm from the roosevelt. Right IJ line  tip in the right main pulmonary artery. Chest tubes in place, no  pneumothorax demonstrated. Question some continued left lower lobe  atelectasis. Orogastric tube tip minimally in the left upper quadrant  in the expected location of the stomach.    Edna Beal PA-C   Surgery  Pager # [phone]

## 2021-01-11 NOTE — PLAN OF CARE
From ICU approx 1600. VSS, on 2L O2. Up with SBA. 3:1 CT to suction. Tele SR, inverted t waves. Cr in place with good urine output. Sternal incision CDI. Pain controlled with robaxin, oxycodone.  Blood sugars just once a day per CV surgery. Reg diet, appetite is okay. IS to 300, encouraged cough deep breathing. CTM

## 2021-01-11 NOTE — PROVIDER NOTIFICATION
Pt with  increased ectopic beats and concern for Afib. ECG completed and Dr Redd at bedside. Chart reviewed and orders for 10 lasix received. Will continue to monitor closely. csccrn

## 2021-01-11 NOTE — PROGRESS NOTES
01/11/21 0929   General Information   Onset of Illness/Injury or Date of Surgery 01/09/21   Referring Physician Angelia Whaley MD   Patient/Family Therapy Goal Statement (SLP) Patient reported no problems swallowing.   Pertinent History of Current Problem 50 year old male with HTN, CAD presented to [facility] on 1/7 with angina and a few weeks of progressive shortness of breath.  Was diagnosed with unstable angina, started on a heparin infusion followed by a [facility] which demonstrated an ulcerative lesion in the distal left main extending into ostia of LAD and left circumflex. Transferred to [facility].  IABP place morning of 1/9 and then CABG was performed after.   General Observations Pleasant and in pain/discomfort.    Past History of Dysphagia No history   Type of Evaluation   Type of Evaluation Swallow Evaluation   Oral Motor   Oral Musculature generally intact   Structural Abnormalities none present   Dentition (Oral Motor)   Dentition (Oral Motor) natural dentition   Facial Symmetry (Oral Motor)   Facial Symmetry (Oral Motor) WNL   Lip Function (Oral Motor)   Lip Range of Motion (Oral Motor) WNL   Tongue Function (Oral Motor)   Tongue ROM (Oral Motor) WNL   Jaw Function (Oral Motor)   Jaw Function (Oral Motor) WNL   Cough/Swallow/Gag Reflex (Oral Motor)   Soft Palate/Velum (Oral Motor) WNL   Volitional Throat Clear/Cough (Oral Motor) WNL   Volitional Swallow (Oral Motor) WNL   Vocal Quality/Secretion Management (Oral Motor)   Vocal Quality (Oral Motor) WFL   General Swallowing Observations   Current Diet/Method of Nutritional Intake (General Swallowing Observations, NIS) other (see comments)  (Clears with advance as tolerated.)   Respiratory Support (General Swallowing Observations) supplemental oxygen;nasal cannula  (Intubated x2 days.)   Swallowing Recommendations   Diet Consistency Recommendations regular diet;thin liquids;other (see comments)  (Advance to regular as tolerated. )   Supervision Level  for Intake close supervision needed   Mode of Delivery Recommendations bolus size, small;food moistened;slow rate of intake  (Avoid straws if coughing noted after the swallow. )   Postural Recommendations none   Swallowing Maneuver Recommendations alternate food and liquid intake;extra swallow   Monitoring/Assistance Required (Eating/Swallowing) monitor for cough or change in vocal quality with intake   Recommended Feeding/Eating Techniques (Swallow Eval) maintain upright posture during/after eating for 30 minutes;maintain upright sitting position for eating;provide assist with feeding;set-up and prepare tray   Medication Administration Recommendations, Swallowing (SLP) Whole in water or apple sauce.   General Therapy Interventions   Planned Therapy Interventions Dysphagia Treatment   Dysphagia treatment Modified diet education;Instruction of safe swallow strategies   SLP Therapy Assessment/Plan   Criteria for Skilled Therapeutic Interventions Met (SLP Eval) yes   SLP Diagnosis Mild dysphagia   Rehab Potential (SLP Eval) good, to achieve stated therapy goals   Therapy Frequency (SLP Eval) 5 times/wk   Predicted Duration of Therapy Intervention (SLP Eval) 1 week   Comment, Therapy Assessment/Plan (SLP) Patient presents with mild oral/pharyngeal dysphagia secondary to intubation. Evaluation was limited in trial amounts due to discomfort and pain. Oral motor function was WFL, and vocal quality was WFL. He was able to tolerate trials of thin liquids via the cup and straw without Sx of aspiration until after a solid was given then had coughing intermittently. Suspect penetration and silent aspiration can not be ruled out. Pudding was tolerated without difficulty. Mastication mildly prolonged for a solid with minimal oral residue after the swallow with mildly reduced laryngeal elevation. Oral residue cleared with an addtional swallow. Patient was unable to sit upright fully due to pain at the time which may have affected  swallow function.    Therapy Plan Review/Discharge Plan (SLP)   Therapy Plan Review (SLP) evaluation/treatment results reviewed;care plan/treatment goals reviewed;risks/benefits reviewed;current/potential barriers reviewed;participants voiced agreement with care plan;participants included;patient   SLP Discharge Planning    SLP Discharge Recommendation (DC Rec) home with assist   SLP Brief overview of current status  Patient with mild oral and pharyngeal dysphagia evaluation was limited due to pain and ability to sit upright fully may have impacted his swallow function. Recommend: 1. Clears with advance to regular as tolerated. Upright or in a chair for all meals, avoid straws if coughing noted, hard swallow and alternate liquids/solids. If coughing continues with thin change to nectar or hold diet.     Total Evaluation Time   Total Evaluation Time (Minutes) 15

## 2021-01-11 NOTE — PROGRESS NOTES
01/11/21 1000   Quick Adds   Type of Visit Initial PT Evaluation   Living Environment   People in home child(juan), adult;spouse   Current Living Arrangements house   Home Accessibility stairs to enter home;stairs within home   Number of Stairs, Main Entrance 8   Stair Railings, Main Entrance railing on right side (ascending);railings safe and in good condition   Number of Stairs, Within Home, Primary 10   Stair Railings, Within Home, Primary railings safe and in good condition;railing on right side (ascending)   Transportation Anticipated car, drives self;family or friend will provide   Self-Care   Usual Activity Tolerance good   Current Activity Tolerance moderate   Regular Exercise Yes   Activity/Exercise Type walking   Exercise Amount/Frequency daily   Equipment Currently Used at Home none   Disability/Function   Fall history within last six months no   General Information   Onset of Illness/Injury or Date of Surgery 01/09/21   Referring Physician Dr. Arellano   Patient/Family Therapy Goals Statement (PT) To go home   Pertinent History of Current Problem (include personal factors and/or comorbidities that impact the POC) Pt is a 50 year old male s/p CABG.   Existing Precautions/Restrictions sternal   Cognition   Orientation Status (Cognition) oriented x 4   Pain Assessment   Patient Currently in Pain No   Range of Motion (ROM)   ROM Quick Adds ROM WFL   Strength   Manual Muscle Testing Quick Adds Strength WFL   Bed Mobility   Comment (Bed Mobility) Mod assist x 2   Transfers   Transfer Safety Comments Min assist x 2   Gait/Stairs (Locomotion)   Comment (Gait/Stairs) 3' EOB to chair min assist   Balance   Balance Comments Good in sitting, fair in standing   Clinical Impression   Criteria for Skilled Therapeutic Intervention yes, treatment indicated   PT Diagnosis (PT) Impaired endurance   Influenced by the following impairments Decreased endurance, decreased balance   Functional limitations due to impairments  Difficulty with bed mobility, transfers, ambulation, stair management   Clinical Presentation Stable/Uncomplicated   Clinical Presentation Rationale VSS, pain controlled   Clinical Decision Making (Complexity) low complexity   Therapy Frequency (PT) 2x/day   Predicted Duration of Therapy Intervention (days/wks) 1 week   Planned Therapy Interventions (PT) balance training;bed mobility training;gait training;stair training;patient/family education;transfer training   Risk & Benefits of therapy have been explained evaluation/treatment results reviewed;care plan/treatment goals reviewed;risks/benefits reviewed;current/potential barriers reviewed;participants voiced agreement with care plan;participants included;patient   PT Discharge Planning    PT Discharge Recommendation (DC Rec) home with outpatient physical therapy   PT Rationale for DC Rec At baseline, pt lives with his wife and adult child in a home with steps to enter and to bedroom. Pt very active prior to admit and highly motivated to return home. Anticipate with further medical management and therapy, patient will progress mobility and independence and be safe to discharge home with family assist as needed for stair management. Pt will benefit from outpatient CR to further continue cardiac rehab and increase mobility and endurance.   PT Brief overview of current status  Bed mobility mod assist x 2, transfers min assist x 2, ambulate 3' FWW min assist   Total Evaluation Time   Total Evaluation Time (Minutes) 10

## 2021-01-11 NOTE — PLAN OF CARE
AxO, up to chair x 2, PRN oxy and dilaudid for pain. IS to 250, reinforced teaching. Good UO. Minimal CT output.

## 2021-01-11 NOTE — PLAN OF CARE
Patient remains in ICU overnight. BP stable without intervention. One episode of SVT rates up to 160 when patient got up to chair lasting ~2 minutes, HR came back to 100 without intervention, patient remained asymptomatic. Adequate UOP. Chest tube output 10-30 ml/hr. PRN dilaudid and oxycodone given multiple times throughout shift. Remains on 2 LPM via NC. A line and PA cath pulled this AM. Wife called and updated per nursing overnight.

## 2021-01-11 NOTE — PLAN OF CARE
Attempted 2nd session, however, per discussion with RN, patient is currently on bedrest and not appropriate for out of bed mobility.

## 2021-01-12 ENCOUNTER — APPOINTMENT (OUTPATIENT)
Dept: SPEECH THERAPY | Facility: CLINIC | Age: 51
DRG: 235 | End: 2021-01-12
Attending: HOSPITALIST
Payer: COMMERCIAL

## 2021-01-12 ENCOUNTER — APPOINTMENT (OUTPATIENT)
Dept: PHYSICAL THERAPY | Facility: CLINIC | Age: 51
DRG: 235 | End: 2021-01-12
Attending: HOSPITALIST
Payer: COMMERCIAL

## 2021-01-12 ENCOUNTER — APPOINTMENT (OUTPATIENT)
Dept: GENERAL RADIOLOGY | Facility: CLINIC | Age: 51
DRG: 235 | End: 2021-01-12
Attending: HOSPITALIST
Payer: COMMERCIAL

## 2021-01-12 LAB
ANION GAP SERPL CALCULATED.3IONS-SCNC: 6 MMOL/L (ref 3–14)
BACTERIA SPEC CULT: NORMAL
BLD PROD TYP BPU: NORMAL
BLD PROD TYP BPU: NORMAL
BLD UNIT ID BPU: 0
BLD UNIT ID BPU: 0
BLOOD PRODUCT CODE: NORMAL
BLOOD PRODUCT CODE: NORMAL
BPU ID: NORMAL
BPU ID: NORMAL
BUN SERPL-MCNC: 21 MG/DL (ref 7–30)
CALCIUM SERPL-MCNC: 8.5 MG/DL (ref 8.5–10.1)
CHLORIDE SERPL-SCNC: 106 MMOL/L (ref 94–109)
CO2 SERPL-SCNC: 26 MMOL/L (ref 20–32)
CREAT SERPL-MCNC: 0.58 MG/DL (ref 0.66–1.25)
ERYTHROCYTE [DISTWIDTH] IN BLOOD BY AUTOMATED COUNT: 12.6 % (ref 10–15)
GFR SERPL CREATININE-BSD FRML MDRD: >90 ML/MIN/{1.73_M2}
GLUCOSE SERPL-MCNC: 115 MG/DL (ref 70–99)
HCT VFR BLD AUTO: 34.5 % (ref 40–53)
HGB BLD-MCNC: 11.2 G/DL (ref 13.3–17.7)
INTERPRETATION ECG - MUSE: NORMAL
Lab: NORMAL
MAGNESIUM SERPL-MCNC: 2.2 MG/DL (ref 1.6–2.3)
MCH RBC QN AUTO: 29.6 PG (ref 26.5–33)
MCHC RBC AUTO-ENTMCNC: 32.5 G/DL (ref 31.5–36.5)
MCV RBC AUTO: 91 FL (ref 78–100)
PHOSPHATE SERPL-MCNC: 1.9 MG/DL (ref 2.5–4.5)
PHOSPHATE SERPL-MCNC: 2.3 MG/DL (ref 2.5–4.5)
PLATELET # BLD AUTO: 171 10E9/L (ref 150–450)
POTASSIUM SERPL-SCNC: 3.8 MMOL/L (ref 3.4–5.3)
RBC # BLD AUTO: 3.78 10E12/L (ref 4.4–5.9)
SODIUM SERPL-SCNC: 138 MMOL/L (ref 133–144)
SPECIMEN SOURCE: NORMAL
TRANSFUSION STATUS PATIENT QL: NORMAL
WBC # BLD AUTO: 11.8 10E9/L (ref 4–11)

## 2021-01-12 PROCEDURE — 250N000011 HC RX IP 250 OP 636: Performed by: PHYSICIAN ASSISTANT

## 2021-01-12 PROCEDURE — 250N000013 HC RX MED GY IP 250 OP 250 PS 637: Performed by: SURGERY

## 2021-01-12 PROCEDURE — 92526 ORAL FUNCTION THERAPY: CPT | Mod: GN

## 2021-01-12 PROCEDURE — 97110 THERAPEUTIC EXERCISES: CPT | Mod: GP

## 2021-01-12 PROCEDURE — 80048 BASIC METABOLIC PNL TOTAL CA: CPT | Performed by: PHYSICIAN ASSISTANT

## 2021-01-12 PROCEDURE — 84100 ASSAY OF PHOSPHORUS: CPT | Performed by: SURGERY

## 2021-01-12 PROCEDURE — 85027 COMPLETE CBC AUTOMATED: CPT | Performed by: PHYSICIAN ASSISTANT

## 2021-01-12 PROCEDURE — 36415 COLL VENOUS BLD VENIPUNCTURE: CPT | Performed by: SURGERY

## 2021-01-12 PROCEDURE — 250N000009 HC RX 250: Performed by: SURGERY

## 2021-01-12 PROCEDURE — 250N000013 HC RX MED GY IP 250 OP 250 PS 637: Performed by: PHYSICIAN ASSISTANT

## 2021-01-12 PROCEDURE — 258N000003 HC RX IP 258 OP 636: Performed by: SURGERY

## 2021-01-12 PROCEDURE — 120N000001 HC R&B MED SURG/OB

## 2021-01-12 PROCEDURE — 84100 ASSAY OF PHOSPHORUS: CPT | Performed by: PHYSICIAN ASSISTANT

## 2021-01-12 PROCEDURE — 36415 COLL VENOUS BLD VENIPUNCTURE: CPT | Performed by: PHYSICIAN ASSISTANT

## 2021-01-12 PROCEDURE — 83735 ASSAY OF MAGNESIUM: CPT | Performed by: PHYSICIAN ASSISTANT

## 2021-01-12 PROCEDURE — 71045 X-RAY EXAM CHEST 1 VIEW: CPT

## 2021-01-12 PROCEDURE — 97530 THERAPEUTIC ACTIVITIES: CPT | Mod: GP

## 2021-01-12 RX ORDER — FUROSEMIDE 10 MG/ML
20 INJECTION INTRAMUSCULAR; INTRAVENOUS ONCE
Status: COMPLETED | OUTPATIENT
Start: 2021-01-12 | End: 2021-01-12

## 2021-01-12 RX ORDER — POTASSIUM CHLORIDE 1500 MG/1
20 TABLET, EXTENDED RELEASE ORAL ONCE
Status: COMPLETED | OUTPATIENT
Start: 2021-01-12 | End: 2021-01-12

## 2021-01-12 RX ORDER — POLYETHYLENE GLYCOL 3350 17 G/17G
17 POWDER, FOR SOLUTION ORAL DAILY
Status: DISCONTINUED | OUTPATIENT
Start: 2021-01-12 | End: 2021-01-14 | Stop reason: HOSPADM

## 2021-01-12 RX ADMIN — ASPIRIN 325 MG: 325 TABLET, COATED ORAL at 08:41

## 2021-01-12 RX ADMIN — OXYCODONE HYDROCHLORIDE 10 MG: 5 TABLET ORAL at 03:41

## 2021-01-12 RX ADMIN — AMPICILLIN AND SULBACTAM 3 G: 2; 1 INJECTION, POWDER, FOR SOLUTION INTRAMUSCULAR; INTRAVENOUS at 02:35

## 2021-01-12 RX ADMIN — METHOCARBAMOL TABLETS 750 MG: 750 TABLET, COATED ORAL at 12:32

## 2021-01-12 RX ADMIN — HEPARIN SODIUM 5000 UNITS: 5000 INJECTION, SOLUTION INTRAVENOUS; SUBCUTANEOUS at 21:58

## 2021-01-12 RX ADMIN — OXYCODONE HYDROCHLORIDE 10 MG: 5 TABLET ORAL at 16:19

## 2021-01-12 RX ADMIN — LIDOCAINE 1 PATCH: 560 PATCH PERCUTANEOUS; TOPICAL; TRANSDERMAL at 08:42

## 2021-01-12 RX ADMIN — AMLODIPINE BESYLATE 2.5 MG: 2.5 TABLET ORAL at 08:41

## 2021-01-12 RX ADMIN — ROSUVASTATIN CALCIUM 20 MG: 20 TABLET, FILM COATED ORAL at 08:42

## 2021-01-12 RX ADMIN — POTASSIUM & SODIUM PHOSPHATES POWDER PACK 280-160-250 MG 1 PACKET: 280-160-250 PACK at 23:23

## 2021-01-12 RX ADMIN — FUROSEMIDE 20 MG: 10 INJECTION, SOLUTION INTRAVENOUS at 10:17

## 2021-01-12 RX ADMIN — ACETAMINOPHEN 975 MG: 325 TABLET, FILM COATED ORAL at 05:37

## 2021-01-12 RX ADMIN — SODIUM PHOSPHATE, MONOBASIC, MONOHYDRATE 15 MMOL: 276; 142 INJECTION, SOLUTION INTRAVENOUS at 12:23

## 2021-01-12 RX ADMIN — OXYCODONE HYDROCHLORIDE 10 MG: 5 TABLET ORAL at 08:41

## 2021-01-12 RX ADMIN — ACETAMINOPHEN 975 MG: 325 TABLET, FILM COATED ORAL at 12:23

## 2021-01-12 RX ADMIN — DOCUSATE SODIUM 50 MG AND SENNOSIDES 8.6 MG 2 TABLET: 8.6; 5 TABLET, FILM COATED ORAL at 08:41

## 2021-01-12 RX ADMIN — OXYCODONE HYDROCHLORIDE 5 MG: 5 TABLET ORAL at 12:32

## 2021-01-12 RX ADMIN — SODIUM PHOSPHATE, MONOBASIC, MONOHYDRATE 15 MMOL: 276; 142 INJECTION, SOLUTION INTRAVENOUS at 09:48

## 2021-01-12 RX ADMIN — POLYETHYLENE GLYCOL 3350 17 G: 17 POWDER, FOR SOLUTION ORAL at 10:17

## 2021-01-12 RX ADMIN — AMPICILLIN AND SULBACTAM 3 G: 2; 1 INJECTION, POWDER, FOR SOLUTION INTRAMUSCULAR; INTRAVENOUS at 22:09

## 2021-01-12 RX ADMIN — POTASSIUM CHLORIDE 20 MEQ: 1500 TABLET, EXTENDED RELEASE ORAL at 08:41

## 2021-01-12 RX ADMIN — METOPROLOL TARTRATE 25 MG: 25 TABLET, FILM COATED ORAL at 21:57

## 2021-01-12 RX ADMIN — PANTOPRAZOLE SODIUM 40 MG: 40 TABLET, DELAYED RELEASE ORAL at 06:54

## 2021-01-12 RX ADMIN — ACETAMINOPHEN 650 MG: 325 TABLET, FILM COATED ORAL at 16:19

## 2021-01-12 RX ADMIN — HEPARIN SODIUM 5000 UNITS: 5000 INJECTION, SOLUTION INTRAVENOUS; SUBCUTANEOUS at 05:38

## 2021-01-12 RX ADMIN — HEPARIN SODIUM 5000 UNITS: 5000 INJECTION, SOLUTION INTRAVENOUS; SUBCUTANEOUS at 12:23

## 2021-01-12 RX ADMIN — METOPROLOL TARTRATE 25 MG: 25 TABLET, FILM COATED ORAL at 08:41

## 2021-01-12 RX ADMIN — OXYCODONE HYDROCHLORIDE 10 MG: 5 TABLET ORAL at 19:07

## 2021-01-12 RX ADMIN — OXYCODONE HYDROCHLORIDE 5 MG: 5 TABLET ORAL at 00:27

## 2021-01-12 RX ADMIN — AMPICILLIN AND SULBACTAM 3 G: 2; 1 INJECTION, POWDER, FOR SOLUTION INTRAMUSCULAR; INTRAVENOUS at 08:41

## 2021-01-12 RX ADMIN — AMPICILLIN AND SULBACTAM 3 G: 2; 1 INJECTION, POWDER, FOR SOLUTION INTRAMUSCULAR; INTRAVENOUS at 14:41

## 2021-01-12 ASSESSMENT — ACTIVITIES OF DAILY LIVING (ADL)
ADLS_ACUITY_SCORE: 16
ADLS_ACUITY_SCORE: 20
ADLS_ACUITY_SCORE: 16
ADLS_ACUITY_SCORE: 16
ADLS_ACUITY_SCORE: 20
ADLS_ACUITY_SCORE: 20

## 2021-01-12 NOTE — PROGRESS NOTES
Patient seen and discussed with Dr. Syl Meléndez [facility]  Cardiovascular and Thoracic Surgery Daily Note          Assessment and Plan:   POD#3 s/p Coronary artery bypass grafting x 2 (radial artery graft to the obtuse marginal branch of the left circumflex coronary artery and pedicled left internal mammary artery to left anterior descending coronary artery), Endoscopic left radial artery harvest by Dr. Jacky Rojas  -CVS: HR: 70s-90s. SBP: 100-120s. Pre op EF: 50-55%. IABP removed on POD#1 without issue. ASA, BB, statin. Amlodipine 2.5mg to prevent radial artery spasm. Pacer wires capped. Chest tubes too much to remove today. CXR stable with expected post operative changes  -Resp: Extubated on POD#1 at 1725. Saturating well on 2L. Weaning as able. Continue to encourage IS, cough, deep breathing, ambulation.   -Neuro:  Grossly intact. Pain controlled.   -Renal: good UOP. Up about 2kg from preoperative weight. Cr: 0.58. Will give one dose IV lasix today and monitor response.   -GI:  Tolerating diet. No BM. +flatus. Continue bowel regimen.   -:  Remove bowie today  -Endo: pre op a1c: 5.5. Blood sugars have remained since surgery. Ok to discontinue blood sugar checks  -FEN: replace electrolytes as needed. Na: 138. K: 3.8  Orders Placed This Encounter      Advance Diet as Tolerated: Regular Diet Adult; Regular Diet Adult    -ID: Temp (24hrs), Av.3  F (37.4  C), Min:97.2  F (36.2  C), Max:100.2  F (37.9  C)  WBC: 11.8. Completed perioperative abx. Started on unasyn for concern of aspiration pneumonia d/t new left lower lobe consolidation and increased FiO2 requirements overnight on POD#0. Leukcytosis resolving.   -Heme: Hgb: 11.2. plt: 171. Acute blood loss anemia and thrombocytopenia related to surgery.   -Proph: PCD, ASA, BB, statin, amlodipine, PPI, sub q heparin  -Dispo: St. [facility]. Continue therapies. Continue chest tubes today but will change to waterseal. Continue to encourage IS, cough,  deep breathing, ambulation. Needs BM, anticipate discharge to home in 2-3 days.           Interval History:   No acute events overnight. Saturating well on 2L. Pain controlled. Tolerating diet. No BM  +flatus         Medications:       acetaminophen  975 mg Oral Q8H     amLODIPine  2.5 mg Oral Daily     ampicillin-sulbactam (UNASYN) IV  3 g Intravenous Q6H     aspirin  325 mg Oral Daily     heparin ANTICOAGULANT  5,000 Units Subcutaneous Q8H     HYDROmorphone  1 mg Intravenous Once     insulin aspart  1-7 Units Subcutaneous TID AC     insulin aspart  1-5 Units Subcutaneous At Bedtime     lidocaine  1-2 patch Transdermal Q24H     lidocaine   Transdermal Q8H     metoprolol tartrate  25 mg Oral BID     pantoprazole  40 mg Oral QAM AC     rosuvastatin  20 mg Oral Daily     senna-docusate  1 tablet Oral BID    Or     senna-docusate  2 tablet Oral BID     sodium chloride (PF)  3 mL Intracatheter Q8H     sodium phosphate  15 mmol Intravenous Q2H     acetaminophen, artificial tears ophthalmic solution, glucose **OR** dextrose **OR** glucagon, hydrALAZINE, HYDROmorphone, hydrOXYzine, ipratropium - albuterol 0.5 mg/2.5 mg/3 mL, lidocaine 4%, lidocaine (buffered or not buffered), methocarbamol, naloxone **OR** naloxone **OR** naloxone **OR** naloxone, ondansetron **OR** ondansetron, oxyCODONE, sodium chloride (PF)          Physical Exam:   Vitals were reviewed  Blood pressure 118/73, pulse 84, temperature 98.6  F (37  C), temperature source Oral, resp. rate 18, weight 118.8 kg (261 lb 12.8 oz), SpO2 98 %.  Rhythm: NSR    Lungs: diminished bases    Cardiovascular: RRR normal s1 and s2    Abdomen: soft NTND    Extremeties: minimal edema    Incision: CDI    CT: to waterseal.     Weight:   Vitals:    01/09/21 0347 01/11/21 0615 01/12/21 0648   Weight: 116.2 kg (256 lb 1.6 oz) 117.8 kg (259 lb 11.2 oz) 118.8 kg (261 lb 12.8 oz)            Data:   Labs:   Lab Results   Component Value Date    WBC 11.8 01/12/2021     Lab Results    Component Value Date    RBC 3.78 01/12/2021     Lab Results   Component Value Date    HGB 11.2 01/12/2021     Lab Results   Component Value Date    HCT 34.5 01/12/2021     No components found for: MCT  Lab Results   Component Value Date    MCV 91 01/12/2021     Lab Results   Component Value Date    MCH 29.6 01/12/2021     Lab Results   Component Value Date    MCHC 32.5 01/12/2021     Lab Results   Component Value Date    RDW 12.6 01/12/2021     Lab Results   Component Value Date     01/12/2021       Last Basic Metabolic Panel:  Lab Results   Component Value Date     01/12/2021      Lab Results   Component Value Date    POTASSIUM 3.8 01/12/2021     Lab Results   Component Value Date    CHLORIDE 106 01/12/2021     Lab Results   Component Value Date    JOSE ALBERTO 8.5 01/12/2021     Lab Results   Component Value Date    CO2 26 01/12/2021     Lab Results   Component Value Date    BUN 21 01/12/2021     Lab Results   Component Value Date    CR 0.58 01/12/2021     Lab Results   Component Value Date     01/12/2021       CXR: 1/12/21    IMPRESSION: Mediastinal and left chest tubes are unchanged. Remainder  of the tubes and lines have been removed. Mild cardiomegaly. Linear  atelectasis in each lung base, improved compared to previous. Lungs  are otherwise clear. No pulmonary edema. No pneumothorax.       Edna Beal PA-C  CV Surgery  Pager # [phone]

## 2021-01-12 NOTE — PLAN OF CARE
Speech Language Therapy Discharge Summary    Reason for therapy discharge:    All goals and outcomes met, no further needs identified.    Progress towards therapy goal(s). See goals on Care Plan in [facility] electronic health record for goal details.  Goals met    Therapy recommendation(s):    No further therapy is recommended. Pt is tolerating a regular diet and thin liquids. He denies any difficulty and there are no indications of aspiration based on bedside assessment or chart review. Dysphagia goals met

## 2021-01-12 NOTE — PROGRESS NOTES
"NUTRITION ASSESSMENT      REASON FOR ASSESSMENT:  Cardiac Surgery Nutrition Consult    CURRENT DIET / INTAKE:  Regular  Pt states appetite is fine - not back to usual, but he is able to eat w/o issue. Stated he tolerated breakfast fine --> just a fruit plate and banana bread.     ANTHROPOMETRICS:   Ht: 5' 6\"  Wt: 116.2 kg (256lb)   BMI: 34.73 kg/m2  IBW: 80.9 kg  Weight Status: Obesity Grade I BMI 30-34.9  %IBW: 144%    MALNUTRITION:  Patient does not meet two of the following criteria necessary for diagnosing malnutrition:  significant weight loss, reduced intake, subcutaneous fat loss, muscle loss or fluid retention. Nutrition Focused Physical Assessment (NFPA) not appropriate at this time.    NUTRITION DIAGNOSIS:   Inadequate oral intake r/t poor appetite and increased needs post op AEB intake of just 2 small solid meals since surgery on 1/9.     INTERVENTIONS:    Nutrition Prescription:  Regular diet, protein emphasis     Implementation:  Nutrition Education (Content):  Discussed the importance of adequate nutrition post-op for healing and energy, emphasizing protein foods, and encouraged patient to order a protein food at each meal.    Receptive to visit, declines intervention at this time. Encouraged pt to order protein with each meal, sources reviewed.     Goals:  Patient to consume ~75% at meals in the next 3 - 5 days    Follow Up/Monitoring (InPatient):  Food and Fluid intake -  Monitor for adequacy    Follow Up/Monitoring (OutPatient):  Patient will participate in out-patient cardiac rehab and attend nutrition classes during the program    Lady Head RD, LD  [facility] [place], [address], [facility], [facility]   Pager: [phone]  Weekend Pager: [phone]    "

## 2021-01-12 NOTE — PLAN OF CARE
A/O x4. VSS on 2 L O2. Tele NSR. PRN Oxycodone and scheduled Tylenol given for pain. CT 3:1 to suction. Pacer wires capped. Dressings clean dry and intact. Incisions with liquid bandage. LS diminished. Needs encouragement with IS, initial education done with [employer] use. Cr in place with adequate urine output. Bowel sounds active, passing flatus, BM-. Denies N/V. Up SBA. Regular diet. PIV saline locked. Continue to monitor.

## 2021-01-13 ENCOUNTER — APPOINTMENT (OUTPATIENT)
Dept: PHYSICAL THERAPY | Facility: CLINIC | Age: 51
DRG: 235 | End: 2021-01-13
Attending: HOSPITALIST
Payer: COMMERCIAL

## 2021-01-13 LAB
ANION GAP SERPL CALCULATED.3IONS-SCNC: 7 MMOL/L (ref 3–14)
BUN SERPL-MCNC: 17 MG/DL (ref 7–30)
CALCIUM SERPL-MCNC: 8.8 MG/DL (ref 8.5–10.1)
CHLORIDE SERPL-SCNC: 107 MMOL/L (ref 94–109)
CO2 SERPL-SCNC: 25 MMOL/L (ref 20–32)
CREAT SERPL-MCNC: 0.59 MG/DL (ref 0.66–1.25)
ERYTHROCYTE [DISTWIDTH] IN BLOOD BY AUTOMATED COUNT: 12.3 % (ref 10–15)
GFR SERPL CREATININE-BSD FRML MDRD: >90 ML/MIN/{1.73_M2}
GLUCOSE SERPL-MCNC: 108 MG/DL (ref 70–99)
HCT VFR BLD AUTO: 35.9 % (ref 40–53)
HGB BLD-MCNC: 11.8 G/DL (ref 13.3–17.7)
INTERPRETATION ECG - MUSE: NORMAL
MCH RBC QN AUTO: 29.4 PG (ref 26.5–33)
MCHC RBC AUTO-ENTMCNC: 32.9 G/DL (ref 31.5–36.5)
MCV RBC AUTO: 89 FL (ref 78–100)
PLATELET # BLD AUTO: 236 10E9/L (ref 150–450)
POTASSIUM SERPL-SCNC: 3.9 MMOL/L (ref 3.4–5.3)
RBC # BLD AUTO: 4.02 10E12/L (ref 4.4–5.9)
SODIUM SERPL-SCNC: 139 MMOL/L (ref 133–144)
WBC # BLD AUTO: 9.9 10E9/L (ref 4–11)

## 2021-01-13 PROCEDURE — 93010 ELECTROCARDIOGRAM REPORT: CPT | Performed by: INTERNAL MEDICINE

## 2021-01-13 PROCEDURE — 97530 THERAPEUTIC ACTIVITIES: CPT | Mod: GP

## 2021-01-13 PROCEDURE — 36415 COLL VENOUS BLD VENIPUNCTURE: CPT | Performed by: PHYSICIAN ASSISTANT

## 2021-01-13 PROCEDURE — 250N000011 HC RX IP 250 OP 636: Performed by: PHYSICIAN ASSISTANT

## 2021-01-13 PROCEDURE — 85027 COMPLETE CBC AUTOMATED: CPT | Performed by: PHYSICIAN ASSISTANT

## 2021-01-13 PROCEDURE — 80048 BASIC METABOLIC PNL TOTAL CA: CPT | Performed by: PHYSICIAN ASSISTANT

## 2021-01-13 PROCEDURE — 250N000013 HC RX MED GY IP 250 OP 250 PS 637: Performed by: SURGERY

## 2021-01-13 PROCEDURE — 120N000001 HC R&B MED SURG/OB

## 2021-01-13 PROCEDURE — 97110 THERAPEUTIC EXERCISES: CPT | Mod: GP

## 2021-01-13 PROCEDURE — 93005 ELECTROCARDIOGRAM TRACING: CPT

## 2021-01-13 PROCEDURE — 250N000013 HC RX MED GY IP 250 OP 250 PS 637: Performed by: PHYSICIAN ASSISTANT

## 2021-01-13 RX ORDER — POTASSIUM CHLORIDE 1500 MG/1
20 TABLET, EXTENDED RELEASE ORAL ONCE
Status: COMPLETED | OUTPATIENT
Start: 2021-01-13 | End: 2021-01-13

## 2021-01-13 RX ORDER — FUROSEMIDE 10 MG/ML
20 INJECTION INTRAMUSCULAR; INTRAVENOUS ONCE
Status: COMPLETED | OUTPATIENT
Start: 2021-01-13 | End: 2021-01-13

## 2021-01-13 RX ADMIN — OXYCODONE HYDROCHLORIDE 10 MG: 5 TABLET ORAL at 18:19

## 2021-01-13 RX ADMIN — POTASSIUM & SODIUM PHOSPHATES POWDER PACK 280-160-250 MG 1 PACKET: 280-160-250 PACK at 09:14

## 2021-01-13 RX ADMIN — POTASSIUM & SODIUM PHOSPHATES POWDER PACK 280-160-250 MG 1 PACKET: 280-160-250 PACK at 16:34

## 2021-01-13 RX ADMIN — AMPICILLIN AND SULBACTAM 3 G: 2; 1 INJECTION, POWDER, FOR SOLUTION INTRAMUSCULAR; INTRAVENOUS at 03:12

## 2021-01-13 RX ADMIN — PANTOPRAZOLE SODIUM 40 MG: 40 TABLET, DELAYED RELEASE ORAL at 06:53

## 2021-01-13 RX ADMIN — FUROSEMIDE 20 MG: 10 INJECTION, SOLUTION INTRAVENOUS at 13:04

## 2021-01-13 RX ADMIN — OXYCODONE HYDROCHLORIDE 10 MG: 5 TABLET ORAL at 09:58

## 2021-01-13 RX ADMIN — LIDOCAINE 1 PATCH: 560 PATCH PERCUTANEOUS; TOPICAL; TRANSDERMAL at 09:15

## 2021-01-13 RX ADMIN — METHOCARBAMOL TABLETS 750 MG: 750 TABLET, COATED ORAL at 16:34

## 2021-01-13 RX ADMIN — ASPIRIN 325 MG: 325 TABLET, COATED ORAL at 09:14

## 2021-01-13 RX ADMIN — POTASSIUM CHLORIDE 20 MEQ: 1500 TABLET, EXTENDED RELEASE ORAL at 03:11

## 2021-01-13 RX ADMIN — HEPARIN SODIUM 5000 UNITS: 5000 INJECTION, SOLUTION INTRAVENOUS; SUBCUTANEOUS at 20:05

## 2021-01-13 RX ADMIN — HYDRALAZINE HYDROCHLORIDE 10 MG: 20 INJECTION INTRAMUSCULAR; INTRAVENOUS at 20:05

## 2021-01-13 RX ADMIN — AMPICILLIN AND SULBACTAM 3 G: 2; 1 INJECTION, POWDER, FOR SOLUTION INTRAMUSCULAR; INTRAVENOUS at 21:44

## 2021-01-13 RX ADMIN — HEPARIN SODIUM 5000 UNITS: 5000 INJECTION, SOLUTION INTRAVENOUS; SUBCUTANEOUS at 13:12

## 2021-01-13 RX ADMIN — OXYCODONE HYDROCHLORIDE 10 MG: 5 TABLET ORAL at 13:03

## 2021-01-13 RX ADMIN — METHOCARBAMOL TABLETS 750 MG: 750 TABLET, COATED ORAL at 09:28

## 2021-01-13 RX ADMIN — METOPROLOL TARTRATE 12.5 MG: 25 TABLET, FILM COATED ORAL at 11:26

## 2021-01-13 RX ADMIN — AMPICILLIN AND SULBACTAM 3 G: 2; 1 INJECTION, POWDER, FOR SOLUTION INTRAMUSCULAR; INTRAVENOUS at 09:14

## 2021-01-13 RX ADMIN — AMLODIPINE BESYLATE 2.5 MG: 2.5 TABLET ORAL at 09:15

## 2021-01-13 RX ADMIN — METOPROLOL TARTRATE 37.5 MG: 25 TABLET, FILM COATED ORAL at 20:05

## 2021-01-13 RX ADMIN — OXYCODONE HYDROCHLORIDE 10 MG: 5 TABLET ORAL at 03:11

## 2021-01-13 RX ADMIN — ROSUVASTATIN CALCIUM 20 MG: 20 TABLET, FILM COATED ORAL at 09:14

## 2021-01-13 RX ADMIN — OXYCODONE HYDROCHLORIDE 10 MG: 5 TABLET ORAL at 06:58

## 2021-01-13 RX ADMIN — OXYCODONE HYDROCHLORIDE 10 MG: 5 TABLET ORAL at 21:43

## 2021-01-13 RX ADMIN — METOPROLOL TARTRATE 25 MG: 25 TABLET, FILM COATED ORAL at 09:14

## 2021-01-13 RX ADMIN — AMPICILLIN AND SULBACTAM 3 G: 2; 1 INJECTION, POWDER, FOR SOLUTION INTRAMUSCULAR; INTRAVENOUS at 16:35

## 2021-01-13 RX ADMIN — HEPARIN SODIUM 5000 UNITS: 5000 INJECTION, SOLUTION INTRAVENOUS; SUBCUTANEOUS at 04:16

## 2021-01-13 ASSESSMENT — ACTIVITIES OF DAILY LIVING (ADL)
ADLS_ACUITY_SCORE: 16

## 2021-01-13 NOTE — PLAN OF CARE
A&O x4, VSS, on RA. LS clear. BS active, passing flatus, BM today. PRN oxycodone and robaxin for pain. Sternal incision and radial incisions closed with liquid bandage, SHANEKA/CDI. Chest tubes removed. Voiding adequately. IND. Potassium and phosphorus recheck for am.

## 2021-01-13 NOTE — PROGRESS NOTES
Patient seen and discussed with Dr. Matti Meléndez [facility]  Cardiovascular and Thoracic Surgery Daily Note          Assessment and Plan:   POD#4 s/p Coronary artery bypass grafting x 2 (radial artery graft to the obtuse marginal branch of the left circumflex coronary artery and pedicled left internal mammary artery to left anterior descending coronary artery), Endoscopic left radial artery harvest by Dr. Jacky Rojas  -CVS: HR: 80s-90s. SBP: 120s-140s. Pre op EF: 50-55%. IABP removed on POD#1 without issue. ASA, BB, statin. Will increase BB today.  Amlodipine 2.5mg to prevent radial artery spasm. Plan to remove pacer wires and chest tubes today after therapy. Repeat CXR tomorrow am.   -Resp: Extubated on POD#1 at 1725. Saturating well on room air. Continue to encourage IS, cough, deep breathing, ambulation.   -Neuro:  Grossly intact. Pain controlled.   -Renal: good UOP. Up about 2kg from preoperative weight. Cr: 0.59 Will repeat lasix today. Continue to monitor.   -GI:  Tolerating diet. +BM. Continue bowel regimen.   -:  Voiding well on own  -Endo: pre op a1c: 5.5. Blood sugars have remained stable since surgery.   -FEN: replace electrolytes as needed. Na: 139. K: 3.9  Orders Placed This Encounter      Advance Diet as Tolerated: Regular Diet Adult; Regular Diet Adult    -ID: Temp (24hrs), Av.1  F (37.3  C), Min:98.5  F (36.9  C), Max:100.2  F (37.9  C)  WBC: 9.9. Completed perioperative abx. Started on unasyn for concern of aspiration pneumonia d/t new left lower lobe consolidation and increased FiO2 requiremetns overnight on POD#0. Leukocytosis resolving.   -Heme: Hgb: 11.8. plt: 236. Acute blood loss anemia and thrombocytopenia related to surgery.   -Proph: PCD, ASA, BB, statin, amlodipine, PPI, sub q heparin.   -Dispo: St. Continue therapies. Will discontinue chest tubes today, repeat CXR tomorrow. Continue to encourage IS, cough, deep breathing, ambulation. Plan to discharge to home if  stable.           Interval History:   No acute events overnight. Saturating well on room air. Pain controlled. Difficulty sleeping in hospital. Tolerating diet. +BM         Medications:       amLODIPine  2.5 mg Oral Daily     ampicillin-sulbactam (UNASYN) IV  3 g Intravenous Q6H     aspirin  325 mg Oral Daily     heparin ANTICOAGULANT  5,000 Units Subcutaneous Q8H     HYDROmorphone  1 mg Intravenous Once     lidocaine  1-2 patch Transdermal Q24H     lidocaine   Transdermal Q8H     metoprolol tartrate  25 mg Oral BID     pantoprazole  40 mg Oral QAM AC     polyethylene glycol  17 g Oral Daily     potassium & sodium phosphates  1 packet Oral TID     rosuvastatin  20 mg Oral Daily     senna-docusate  1 tablet Oral BID    Or     senna-docusate  2 tablet Oral BID     sodium chloride (PF)  3 mL Intracatheter Q8H     acetaminophen, artificial tears ophthalmic solution, glucose **OR** dextrose **OR** glucagon, hydrALAZINE, HYDROmorphone, hydrOXYzine, ipratropium - albuterol 0.5 mg/2.5 mg/3 mL, lidocaine 4%, lidocaine (buffered or not buffered), methocarbamol, naloxone **OR** naloxone **OR** naloxone **OR** naloxone, ondansetron **OR** ondansetron, oxyCODONE, sodium chloride (PF)          Physical Exam:   Vitals were reviewed  Blood pressure 122/79, pulse 98, temperature 99.3  F (37.4  C), temperature source Oral, resp. rate 16, weight 118.2 kg (260 lb 9.3 oz), SpO2 96 %.  Rhythm: NSR    Lungs: diminished bases    Cardiovascular: RRR normal s1 and s2    Abdomen: soft NTND    Extremeties: minimal edema    Incision: CDI    CT: to remove today    Weight:   Vitals:    01/09/21 0347 01/11/21 0615 01/12/21 0648 01/13/21 0600   Weight: 116.2 kg (256 lb 1.6 oz) 117.8 kg (259 lb 11.2 oz) 118.8 kg (261 lb 12.8 oz) 118.2 kg (260 lb 9.3 oz)            Data:   Labs:   Lab Results   Component Value Date    WBC 9.9 01/13/2021     Lab Results   Component Value Date    RBC 4.02 01/13/2021     Lab Results   Component Value Date    HGB 11.8  01/13/2021     Lab Results   Component Value Date    HCT 35.9 01/13/2021     No components found for: MCT  Lab Results   Component Value Date    MCV 89 01/13/2021     Lab Results   Component Value Date    MCH 29.4 01/13/2021     Lab Results   Component Value Date    MCHC 32.9 01/13/2021     Lab Results   Component Value Date    RDW 12.3 01/13/2021     Lab Results   Component Value Date     01/13/2021       Last Basic Metabolic Panel:  Lab Results   Component Value Date     01/13/2021      Lab Results   Component Value Date    POTASSIUM 3.9 01/13/2021     Lab Results   Component Value Date    CHLORIDE 107 01/13/2021     Lab Results   Component Value Date    JOES ALBERTO 8.8 01/13/2021     Lab Results   Component Value Date    CO2 25 01/13/2021     Lab Results   Component Value Date    BUN 17 01/13/2021     Lab Results   Component Value Date    CR 0.59 01/13/2021     Lab Results   Component Value Date     01/13/2021       CXR: 1/12/21    IMPRESSION: Mediastinal and left chest tubes are unchanged. Remainder  of the tubes and lines have been removed. Mild cardiomegaly. Linear  atelectasis in each lung base, improved compared to previous. Lungs  are otherwise clear. No pulmonary edema. No pneumothorax.    Edna Beal PA-C  CV Surgery  Pager # [phone]

## 2021-01-14 ENCOUNTER — APPOINTMENT (OUTPATIENT)
Dept: GENERAL RADIOLOGY | Facility: CLINIC | Age: 51
DRG: 235 | End: 2021-01-14
Attending: PHYSICIAN ASSISTANT
Payer: COMMERCIAL

## 2021-01-14 ENCOUNTER — TELEPHONE (OUTPATIENT)
Dept: OTHER | Facility: CLINIC | Age: 51
End: 2021-01-14

## 2021-01-14 VITALS
BODY MASS INDEX: 34.83 KG/M2 | OXYGEN SATURATION: 95 % | HEART RATE: 87 BPM | WEIGHT: 256.84 LBS | RESPIRATION RATE: 16 BRPM | TEMPERATURE: 97.9 F | DIASTOLIC BLOOD PRESSURE: 75 MMHG | SYSTOLIC BLOOD PRESSURE: 120 MMHG

## 2021-01-14 PROBLEM — E87.70 FLUID OVERLOAD: Status: ACTIVE | Noted: 2021-01-14

## 2021-01-14 PROBLEM — Z95.1 S/P CABG (CORONARY ARTERY BYPASS GRAFT): Status: ACTIVE | Noted: 2021-01-14

## 2021-01-14 PROBLEM — R73.9 TRANSIENT HYPERGLYCEMIA POST PROCEDURE: Status: ACTIVE | Noted: 2021-01-14

## 2021-01-14 LAB
ANION GAP SERPL CALCULATED.3IONS-SCNC: 9 MMOL/L (ref 3–14)
BUN SERPL-MCNC: 16 MG/DL (ref 7–30)
CALCIUM SERPL-MCNC: 9.1 MG/DL (ref 8.5–10.1)
CHLORIDE SERPL-SCNC: 105 MMOL/L (ref 94–109)
CO2 SERPL-SCNC: 22 MMOL/L (ref 20–32)
CREAT SERPL-MCNC: 0.57 MG/DL (ref 0.66–1.25)
ERYTHROCYTE [DISTWIDTH] IN BLOOD BY AUTOMATED COUNT: 12.5 % (ref 10–15)
GFR SERPL CREATININE-BSD FRML MDRD: >90 ML/MIN/{1.73_M2}
GLUCOSE SERPL-MCNC: 104 MG/DL (ref 70–99)
HCT VFR BLD AUTO: 37.7 % (ref 40–53)
HGB BLD-MCNC: 12.8 G/DL (ref 13.3–17.7)
MCH RBC QN AUTO: 29.8 PG (ref 26.5–33)
MCHC RBC AUTO-ENTMCNC: 34 G/DL (ref 31.5–36.5)
MCV RBC AUTO: 88 FL (ref 78–100)
PHOSPHATE SERPL-MCNC: 2.7 MG/DL (ref 2.5–4.5)
PLATELET # BLD AUTO: 307 10E9/L (ref 150–450)
POTASSIUM SERPL-SCNC: 3.8 MMOL/L (ref 3.4–5.3)
RBC # BLD AUTO: 4.3 10E12/L (ref 4.4–5.9)
SODIUM SERPL-SCNC: 136 MMOL/L (ref 133–144)
WBC # BLD AUTO: 10.9 10E9/L (ref 4–11)

## 2021-01-14 PROCEDURE — 250N000013 HC RX MED GY IP 250 OP 250 PS 637: Performed by: PHYSICIAN ASSISTANT

## 2021-01-14 PROCEDURE — 36415 COLL VENOUS BLD VENIPUNCTURE: CPT | Performed by: PHYSICIAN ASSISTANT

## 2021-01-14 PROCEDURE — 250N000011 HC RX IP 250 OP 636: Performed by: PHYSICIAN ASSISTANT

## 2021-01-14 PROCEDURE — 80048 BASIC METABOLIC PNL TOTAL CA: CPT | Performed by: PHYSICIAN ASSISTANT

## 2021-01-14 PROCEDURE — 84100 ASSAY OF PHOSPHORUS: CPT | Performed by: PHYSICIAN ASSISTANT

## 2021-01-14 PROCEDURE — 71046 X-RAY EXAM CHEST 2 VIEWS: CPT

## 2021-01-14 PROCEDURE — 85027 COMPLETE CBC AUTOMATED: CPT | Performed by: PHYSICIAN ASSISTANT

## 2021-01-14 RX ORDER — ASPIRIN 325 MG
325 TABLET, DELAYED RELEASE (ENTERIC COATED) ORAL DAILY
Qty: 30 TABLET | Refills: 0 | Status: SHIPPED | OUTPATIENT
Start: 2021-01-15 | End: 2022-11-07

## 2021-01-14 RX ORDER — AMLODIPINE BESYLATE 5 MG/1
5 TABLET ORAL ONCE
Status: COMPLETED | OUTPATIENT
Start: 2021-01-14 | End: 2021-01-14

## 2021-01-14 RX ORDER — METOPROLOL TARTRATE 50 MG
50 TABLET ORAL 2 TIMES DAILY
Qty: 60 TABLET | Refills: 0 | Status: SHIPPED | OUTPATIENT
Start: 2021-01-14 | End: 2021-01-25

## 2021-01-14 RX ORDER — AMLODIPINE BESYLATE 10 MG/1
10 TABLET ORAL DAILY
Qty: 30 TABLET | Refills: 3 | Status: SHIPPED | OUTPATIENT
Start: 2021-01-15 | End: 2021-01-14

## 2021-01-14 RX ORDER — OXYCODONE HYDROCHLORIDE 5 MG/1
5-10 TABLET ORAL
Qty: 50 TABLET | Refills: 0 | Status: SHIPPED | OUTPATIENT
Start: 2021-01-14 | End: 2021-01-25

## 2021-01-14 RX ORDER — AMOXICILLIN 250 MG
2 CAPSULE ORAL 2 TIMES DAILY PRN
Qty: 30 TABLET | Refills: 0 | Status: SHIPPED | OUTPATIENT
Start: 2021-01-14 | End: 2021-02-07

## 2021-01-14 RX ORDER — PANTOPRAZOLE SODIUM 40 MG/1
40 TABLET, DELAYED RELEASE ORAL
Qty: 14 TABLET | Refills: 0 | Status: SHIPPED | OUTPATIENT
Start: 2021-01-15 | End: 2021-01-29

## 2021-01-14 RX ORDER — AMLODIPINE BESYLATE 5 MG/1
7.5 TABLET ORAL DAILY
Qty: 30 TABLET | Refills: 3 | Status: SHIPPED | OUTPATIENT
Start: 2021-01-14 | End: 2021-02-07

## 2021-01-14 RX ORDER — ROSUVASTATIN CALCIUM 20 MG/1
20 TABLET, COATED ORAL DAILY
Qty: 30 TABLET | Refills: 3 | Status: SHIPPED | OUTPATIENT
Start: 2021-01-15 | End: 2021-02-07

## 2021-01-14 RX ORDER — POLYETHYLENE GLYCOL 3350 17 G/17G
17 POWDER, FOR SOLUTION ORAL DAILY
Qty: 510 G | Refills: 0 | Status: SHIPPED | OUTPATIENT
Start: 2021-01-14 | End: 2021-09-24

## 2021-01-14 RX ORDER — AMLODIPINE BESYLATE 10 MG/1
10 TABLET ORAL DAILY
Status: DISCONTINUED | OUTPATIENT
Start: 2021-01-15 | End: 2021-01-14 | Stop reason: HOSPADM

## 2021-01-14 RX ORDER — ACETAMINOPHEN 325 MG/1
650 TABLET ORAL EVERY 4 HOURS PRN
Qty: 40 TABLET | Refills: 0 | Status: SHIPPED | OUTPATIENT
Start: 2021-01-14 | End: 2021-09-24

## 2021-01-14 RX ORDER — METHOCARBAMOL 750 MG/1
750 TABLET, FILM COATED ORAL 4 TIMES DAILY PRN
Qty: 40 TABLET | Refills: 0 | Status: SHIPPED | OUTPATIENT
Start: 2021-01-14 | End: 2021-02-07

## 2021-01-14 RX ADMIN — AMLODIPINE BESYLATE 5 MG: 5 TABLET ORAL at 08:14

## 2021-01-14 RX ADMIN — ASPIRIN 325 MG: 325 TABLET, COATED ORAL at 08:01

## 2021-01-14 RX ADMIN — HEPARIN SODIUM 5000 UNITS: 5000 INJECTION, SOLUTION INTRAVENOUS; SUBCUTANEOUS at 04:34

## 2021-01-14 RX ADMIN — HYDRALAZINE HYDROCHLORIDE 10 MG: 20 INJECTION INTRAMUSCULAR; INTRAVENOUS at 04:38

## 2021-01-14 RX ADMIN — AMLODIPINE BESYLATE 2.5 MG: 2.5 TABLET ORAL at 08:01

## 2021-01-14 RX ADMIN — AMPICILLIN AND SULBACTAM 3 G: 2; 1 INJECTION, POWDER, FOR SOLUTION INTRAMUSCULAR; INTRAVENOUS at 04:34

## 2021-01-14 RX ADMIN — OXYCODONE HYDROCHLORIDE 10 MG: 5 TABLET ORAL at 01:25

## 2021-01-14 RX ADMIN — HYDRALAZINE HYDROCHLORIDE 10 MG: 20 INJECTION INTRAMUSCULAR; INTRAVENOUS at 00:50

## 2021-01-14 RX ADMIN — OXYCODONE HYDROCHLORIDE 10 MG: 5 TABLET ORAL at 08:00

## 2021-01-14 RX ADMIN — PANTOPRAZOLE SODIUM 40 MG: 40 TABLET, DELAYED RELEASE ORAL at 06:31

## 2021-01-14 RX ADMIN — METOPROLOL TARTRATE 37.5 MG: 25 TABLET, FILM COATED ORAL at 08:01

## 2021-01-14 RX ADMIN — METOPROLOL TARTRATE 12.5 MG: 25 TABLET, FILM COATED ORAL at 08:20

## 2021-01-14 RX ADMIN — ROSUVASTATIN CALCIUM 20 MG: 20 TABLET, FILM COATED ORAL at 08:01

## 2021-01-14 RX ADMIN — LIDOCAINE 1 PATCH: 560 PATCH PERCUTANEOUS; TOPICAL; TRANSDERMAL at 10:40

## 2021-01-14 RX ADMIN — OXYCODONE HYDROCHLORIDE 10 MG: 5 TABLET ORAL at 11:08

## 2021-01-14 RX ADMIN — OXYCODONE HYDROCHLORIDE 10 MG: 5 TABLET ORAL at 04:38

## 2021-01-14 RX ADMIN — AMPICILLIN AND SULBACTAM 3 G: 2; 1 INJECTION, POWDER, FOR SOLUTION INTRAMUSCULAR; INTRAVENOUS at 10:37

## 2021-01-14 ASSESSMENT — ACTIVITIES OF DAILY LIVING (ADL)
ADLS_ACUITY_SCORE: 16

## 2021-01-14 NOTE — PLAN OF CARE
A&Ox4. VSS on RA; SBP parameters <140 & DBP <90, Hydralazine given x3. Tele: SR. Up Independently. Regular diet. C/o left shoulder pain, managed with PRN Oxycodone. Sternal incision WDL, closed with liquid bandage. Left radial site WDL ex bruised, closed with liquid bandage, tender. LS clear. Voiding bathroom. IV-SL. K and Phos recheck in AM. Plan for repeat chest XRay. Discharge pending. Continue to monitor.

## 2021-01-14 NOTE — DISCHARGE SUMMARY
[facility]  Hospitalist Discharge Summary       Date of Admission:  1/7/2021  Date of Discharge:  1/8/2021  2:35 PM  Discharging Provider: Aravind Mesa MD      Discharge Diagnoses   # CAD with multivessel disease including left main disease  # HTN  # Hx of depression    Being transferred to [facility] for CABG and CT surgery consult    Hospital Course        Mor Monson is a 50 year old male with a PMH significant for HTN (not on BP meds), who presents with 6-8 weeks of progressive RAY, decreased exercise tolerance and chest tightness with exertion. It has been escalating since Christmas and therefore stopped his daily walks. He was seen by PCP virtually to get outpt Stress testing but given the escalating nature, concerning for unstable angina, he was directed to come to the ED for further evaluation.      Work up in the ED reveals negative troponin and non ischemic EKG. Cardiology was contacted due to classic history for unstable angina, patient was started on heparin drip and admitted to hospital.     Patient has fairly classic story for angina. Has had past stress echo that was negative in 2018. His serial troponin were negative. Seen by cardiology who discussed with patient about potentially stress test versus coronary angiogram.  Patient leaning towards having an angiogram.  Also during this time, his echo showed EF slightly reduced and regional wall motion normalities. Patient underwent coronary angiogram which showed multiple vessel CAD including left main disease. Patient was directly transferred to [facility] by Cardiology for CT surgery consult and CABG consideration.     Further medical optimization pending stay at [facility]. A1c is 5.5, LDL of 159, elevated.  Started on rosuvastatin. Started on Lisinopril and metoprolol during his stay at [facility]. Patient tells me that he checks his blood pressure regularly at home and usually it  is systolic 130-140, which is why he was not taking any medications.  Recommended getting a new machine and regular follow-up with his outpatient providers as he likely needs antihypertensives at this stage. Again will need further optimization of this medication regimen after further stay at [facility].     Consultations This Hospital Stay   CARDIOLOGY IP CONSULT  PHARMACY IP CONSULT  PHARMACY IP CONSULT  CARDIAC REHAB IP CONSULT    Code Status   Full Code    Time Spent on this Encounter   I, Aravind Mesa MD, personally saw the patient today and spent greater than 30 minutes discharging this patient.       Aravind Mesa MD  [facility]  ______________________________________________________________________    Physical Exam   Weight: 262 lbs 11.2 oz  Exam from this morning  Patient is awake and alert, no acute distress  Lungs are clear to auscultation   Cardiac exam reveals regular rate and rhythm, normal S1, S2  Abdomen is soft, non-tender, no rebound or guarding       Primary Care Physician   Asad Goodwin    Discharge Disposition   Transferred to [facility]     Allergies   No Known Allergies    Significant Results and Procedures   Results for orders placed or performed during the hospital encounter of 21   XR Chest 2 Views    Narrative    CHEST TWO VIEWS   2021 12:39 PM     HISTORY: Exertional chest pain.    COMPARISON: None.      Impression    IMPRESSION: No acute cardiopulmonary disease.    EWA BAEZ MD   Echocardiogram Complete    Narrative    [id number]  [id number]  [id number]  [id number]^DEMETRIO^K           [facility]  Echocardiography Laboratory  [address] [place] [address]        Name: SONYA KERNS  MRN: [id number]  : 1970  Study Date: 2021 08:06 AM  Age: 50 yrs  Gender: Male  Patient Location: [facility]  Reason For Study: Chest Pain  Ordering Physician: DEMETRIO MCCARTY  Performed By: Alanis Rosen     BSA: 2.4 m2  Height: 72  in  Weight: 262 lb  HR: 74  BP: 137/82 mmHg  _____________________________________________________________________________  __        Procedure  Complete Portable Echo Adult. Optison (NDC #[id number]) given intravenously.  _____________________________________________________________________________  __        Interpretation Summary     The visual ejection fraction is estimated at 50-55%.  Left ventricular systolic function is borderline reduced.  There are regional wall motion abnormalities as specified.  Mild aortic root dilatation.  The ascending aorta is Mildly dilated.  The study was technically difficult.  _____________________________________________________________________________  __        Left Ventricle  The left ventricle is normal in size. There is normal left ventricular wall  thickness. Diastolic Doppler findings (E/E' ratio and/or other parameters)  suggest left ventricular filling pressures are indeterminate. The visual  ejection fraction is estimated at 50-55%. Left ventricular systolic function  is borderline reduced. The mid anterolateral and mid anterior wall appear  moderately hypokinetic. There are regional wall motion abnormalities as  specified.     Right Ventricle  The right ventricle is normal in size and function.     Atria  Normal left atrial size. Right atrial size is normal. There is no color  Doppler evidence of an atrial shunt.     Mitral Valve  The mitral valve leaflets are mildly thickened. There is mild (1+) mitral  regurgitation.        Tricuspid Valve  There is trace tricuspid regurgitation. The right ventricular systolic  pressure is approximated at 31.4 mmHg plus the right atrial pressure.     Aortic Valve  The aortic valve is trileaflet. There is mild trileaflet aortic sclerosis.  There is trace aortic regurgitation. No hemodynamically significant valvular  aortic stenosis.     Pulmonic Valve  There is no pulmonic valvular regurgitation. Normal pulmonic valve velocity.      Vessels  Mild aortic root dilatation. The ascending aorta is Mildly dilated. The  inferior vena cava was normal in size with preserved respiratory variability.  IVC diameter <2.1 cm collapsing >50% with sniff suggests a normal RA pressure  of 3 mmHg.     Pericardium  There is no pericardial effusion.        Rhythm  Sinus rhythm was noted.  _____________________________________________________________________________  __  MMode/2D Measurements & Calculations     IVSd: 1.1 cm  LVIDd: 5.2 cm  LVIDs: 3.4 cm  LVPWd: 1.1 cm  FS: 34.8 %  LV mass(C)d: 227.4 grams  LV mass(C)dI: 95.2 grams/m2  Ao root diam: 4.3 cm  LA dimension: 4.2 cm  asc Aorta Diam: 4.3 cm  LA/Ao: 0.97  LVOT diam: 2.6 cm  LVOT area: 5.3 cm2     EF(MOD-bp): 53.2 %  LA Volume (BP): 88.9 ml  LA Volume Index (BP): 37.2 ml/m2     RWT: 0.44        Doppler Measurements & Calculations  MV E max rachel: 70.3 cm/sec  MV A max rachel: 50.9 cm/sec  MV E/A: 1.4  MV max PG: 3.5 mmHg  MV mean P.8 mmHg  MV V2 VTI: 25.8 cm  MV dec time: 0.25 sec  PA acc time: 0.11 sec  TR max rachel: 280.2 cm/sec  TR max P.4 mmHg  E/E' av.4  Lateral E/e': 8.4  Medial E/e': 10.4              _____________________________________________________________________________  __        Report approved by: Brie Malagon 2021 09:53 AM      Cardiac Catheterization    Narrative      Left ventricular filling pressures are normal.    Dist LM lesion is 90% stenosed.    Ost LAD lesion is 50% stenosed.    Prox LAD lesion is 15% stenosed.    Prox Cx lesion is 15% stenosed.    Prox RCA lesion is 20% stenosed.    Dist RCA lesion is 65% stenosed.    RPDA lesion is 10% stenosed.     1. Ulcerative severe lesion in distal LMA extending into ostia of Lad,   Lcx,   2. Moderate distal RCA  ST elevation noted with each LCA injection. Pt is stable  I have discussed case with Margarita Davis, Hospitalist @[facility], and [facility]   Edna  Will need CABG to Lad, Lcx-- I dont think high diagonal needs cab and  I   suspect distal RCA is not flow limiting  May need IABP pre-op.  VM left with wife Lindy (per pt request)   Restart heparin after 6-7pm tonight if femoral area ok

## 2021-01-14 NOTE — PROGRESS NOTES
CV Surgery    S: Sitting up in chair, eating breakfast, denies pain, tolerating diet, was on unasyn for possible aspiration pneumonia-completed 4 day course. CXR stable     O: B/P: 141/87, T: 98.2, P: 109, R: 15  General: NAD  Heart: s1/s2, no m/r/g  Lungs: clear  Abd: s/nt/nd  Sternum: cdi  Extremities: no LE edema    Lab Results   Component Value Date    WBC 10.9 01/14/2021     Lab Results   Component Value Date    RBC 4.30 01/14/2021     Lab Results   Component Value Date    HGB 12.8 01/14/2021     Lab Results   Component Value Date    HCT 37.7 01/14/2021     No components found for: MCT  Lab Results   Component Value Date    MCV 88 01/14/2021     Lab Results   Component Value Date    MCH 29.8 01/14/2021     Lab Results   Component Value Date    MCHC 34.0 01/14/2021     Lab Results   Component Value Date    RDW 12.5 01/14/2021     Lab Results   Component Value Date     01/14/2021       Last Basic Metabolic Panel:  Lab Results   Component Value Date     01/13/2021      Lab Results   Component Value Date    POTASSIUM 3.9 01/13/2021     Lab Results   Component Value Date    CHLORIDE 107 01/13/2021     Lab Results   Component Value Date    JOSE ALBERTO 8.8 01/13/2021     Lab Results   Component Value Date    CO2 25 01/13/2021     Lab Results   Component Value Date    BUN 17 01/13/2021     Lab Results   Component Value Date    CR 0.59 01/13/2021     Lab Results   Component Value Date     01/13/2021       A/P: POD# 5 s/p Coronary artery bypass grafting x 2 (radial artery graft to the obtuse marginal branch of the left circumflex coronary artery and pedicled left internal mammary artery to left anterior descending coronary artery), Endoscopic left radial artery harvest by Dr. Jacky Oconnell PA-C  Pager [phone]

## 2021-01-14 NOTE — PLAN OF CARE
A&Ox4. VSS. LS clear. BS active, passing flatus. Incisions SHANEKA. Showered. IND. Pain controlled with robaxin and oxycodone. Videos and education complete. Reviewed all medication and discharge instructions with pt. All questions about medications and discharge instructions answered. IV removed. All belongings sent with pt. Pt discharged home by wife. Pt discharged with oxycodone and robaxin.

## 2021-01-14 NOTE — TELEPHONE ENCOUNTER
Discharge teaching done with wife via phone. All questions addressed. Will follow up with post discharge call in a few days. Contact information given to wife for any concerns or questions that arise.

## 2021-01-14 NOTE — PLAN OF CARE
Physical Therapy Discharge Summary    Reason for therapy discharge:    Discharged to home with Phase II OP CR    Progress towards therapy goal(s). See goals on Care Plan in [facility] electronic health record for goal details.  Goals met    Therapy recommendation(s):    Continued therapy is recommended.  Rationale/Recommendations:  Phase II OP CR to safely progress functional activity tolerance with monitored exercise and continue cardiac health education.

## 2021-01-18 LAB — INTERPRETATION ECG - MUSE: NORMAL

## 2021-01-19 ENCOUNTER — TELEPHONE (OUTPATIENT)
Dept: OTHER | Facility: CLINIC | Age: 51
End: 2021-01-19

## 2021-01-20 ENCOUNTER — TELEPHONE (OUTPATIENT)
Dept: CARDIOLOGY | Facility: CLINIC | Age: 51
End: 2021-01-20

## 2021-01-20 NOTE — TELEPHONE ENCOUNTER
48 hour post op call    ACTIVITY  How are you doing with activity? Doing well moving around my house, too icy outside to walk outside.   Are you continuing to use your IS 3-4 times a day and do you have any shortness of breath? I didn't use it the first few days. Have used it that past few days and have noticed I haven't gained anything yet but   Are you weighing yourself daily and has it been stable? Yes, I have lost a little weight.     PAIN  How is your pain, what are you doing for your pain? Bone soreness but otherwise not much pain. Nothing during the day. Robaxin-- in evening. Oxycodone only at bedtime.   Are you still doing sternal precautions? Yes.    Do you hear any clicking when you are moving or taking a deep breath? No clicking and popping     INCISION:  Healing well and cleaning it daily with antibacterial soap. I have been letting it air dry. Discussed that it is ok to pat dry    ASSISTANCE  Do you have someone at home to help you with your daily activities and transportation needs? Yes my wife Jessica.       MEDICATIONS  I would like to review your discharge medications and answer any questions you may have.  No questions     Are you on a blood thinner/Coumadin  n/a    Who is managing your Coumadin dosing/INR? n/a     FOLLOW UP       Scheduled for cardiac rehab? Yes tomorrow  Scheduled to see our PA or Surgeon? Yes 1/25-- would like to change that to a video visit  Scheduled to see your cardiologist ? Yes end of March  Scheduled to see your primary care physician? I have already seen my PCP  Do you have our contact information? Yes    STOPLIGHT TOOL- no questions      Were you happy with your care? yes  Could we have done anything better? Allow more time in between vital checks overnight.

## 2021-01-21 ENCOUNTER — HOSPITAL ENCOUNTER (OUTPATIENT)
Dept: CARDIAC REHAB | Facility: CLINIC | Age: 51
End: 2021-01-21
Attending: PHYSICIAN ASSISTANT
Payer: COMMERCIAL

## 2021-01-21 DIAGNOSIS — I25.110 CORONARY ARTERY DISEASE INVOLVING NATIVE CORONARY ARTERY OF NATIVE HEART WITH UNSTABLE ANGINA PECTORIS (H): ICD-10-CM

## 2021-01-21 DIAGNOSIS — Z95.1 S/P CABG (CORONARY ARTERY BYPASS GRAFT): ICD-10-CM

## 2021-01-21 PROCEDURE — 999N000108 HC STATISTIC OP CARDIAC VISIT #2: Performed by: OCCUPATIONAL THERAPIST

## 2021-01-21 PROCEDURE — 93798 PHYS/QHP OP CAR RHAB W/ECG: CPT | Performed by: OCCUPATIONAL THERAPIST

## 2021-01-21 PROCEDURE — 93797 PHYS/QHP OP CAR RHAB WO ECG: CPT | Mod: 59 | Performed by: OCCUPATIONAL THERAPIST

## 2021-01-21 PROCEDURE — 999N000109 HC STATISTIC OP CR VISIT: Performed by: OCCUPATIONAL THERAPIST

## 2021-01-28 ENCOUNTER — HOSPITAL ENCOUNTER (OUTPATIENT)
Dept: CARDIAC REHAB | Facility: CLINIC | Age: 51
End: 2021-01-28
Attending: PHYSICIAN ASSISTANT
Payer: COMMERCIAL

## 2021-01-28 PROCEDURE — 999N000109 HC STATISTIC OP CR VISIT

## 2021-01-28 PROCEDURE — 93798 PHYS/QHP OP CAR RHAB W/ECG: CPT

## 2021-02-01 ENCOUNTER — HOSPITAL ENCOUNTER (OUTPATIENT)
Dept: CARDIAC REHAB | Facility: CLINIC | Age: 51
End: 2021-02-01
Attending: PHYSICIAN ASSISTANT
Payer: COMMERCIAL

## 2021-02-01 PROCEDURE — 93798 PHYS/QHP OP CAR RHAB W/ECG: CPT | Performed by: OCCUPATIONAL THERAPIST

## 2021-02-01 PROCEDURE — 999N000109 HC STATISTIC OP CR VISIT: Performed by: OCCUPATIONAL THERAPIST

## 2021-02-05 ENCOUNTER — HOSPITAL ENCOUNTER (OUTPATIENT)
Dept: CARDIAC REHAB | Facility: CLINIC | Age: 51
End: 2021-02-05
Attending: PHYSICIAN ASSISTANT
Payer: COMMERCIAL

## 2021-02-05 PROCEDURE — 93798 PHYS/QHP OP CAR RHAB W/ECG: CPT

## 2021-02-05 PROCEDURE — 999N000109 HC STATISTIC OP CR VISIT

## 2021-02-07 ENCOUNTER — TELEPHONE (OUTPATIENT)
Dept: CARDIOLOGY | Facility: CLINIC | Age: 51
End: 2021-02-07

## 2021-02-07 DIAGNOSIS — Z95.1 S/P CABG (CORONARY ARTERY BYPASS GRAFT): ICD-10-CM

## 2021-02-07 RX ORDER — METHOCARBAMOL 750 MG/1
750 TABLET, FILM COATED ORAL 4 TIMES DAILY PRN
Qty: 40 TABLET | Refills: 0 | Status: SHIPPED | OUTPATIENT
Start: 2021-02-07 | End: 2021-09-24

## 2021-02-07 RX ORDER — AMOXICILLIN 250 MG
2 CAPSULE ORAL 2 TIMES DAILY PRN
Qty: 30 TABLET | Refills: 0 | Status: SHIPPED | OUTPATIENT
Start: 2021-02-07 | End: 2021-09-24

## 2021-02-07 RX ORDER — ROSUVASTATIN CALCIUM 20 MG/1
20 TABLET, COATED ORAL DAILY
Qty: 30 TABLET | Refills: 3 | Status: SHIPPED | OUTPATIENT
Start: 2021-02-07 | End: 2021-06-21

## 2021-02-07 RX ORDER — AMLODIPINE BESYLATE 5 MG/1
7.5 TABLET ORAL DAILY
Qty: 30 TABLET | Refills: 3 | Status: SHIPPED | OUTPATIENT
Start: 2021-02-07 | End: 2021-03-24

## 2021-02-07 NOTE — TELEPHONE ENCOUNTER
CVTS    Received call from patients wife.   She is having a hard time getting his refills released from our discharge pharmacy to their pharmacy. He is also having some increased shoulder pain and would like a refill of robaxin as well as refill on stool softener as his stools are becoming hard again.   new refills of amlodipine, robaxin, senokot and rosouvastatin sent to his preferred pharmacy  Already has refills of metoprolol.   Patients wife also inquiring if he can sleep on his side yet, explained that sleeping on his side should be ok at this point but if he experiences pain in his chest when he is on his side then it is too soon.    All questions and concerns addressed.    Edna Beal PA-C

## 2021-02-08 ENCOUNTER — HOSPITAL ENCOUNTER (OUTPATIENT)
Dept: CARDIAC REHAB | Facility: CLINIC | Age: 51
End: 2021-02-08
Attending: PHYSICIAN ASSISTANT
Payer: COMMERCIAL

## 2021-02-08 PROCEDURE — 999N000109 HC STATISTIC OP CR VISIT

## 2021-02-08 PROCEDURE — 93798 PHYS/QHP OP CAR RHAB W/ECG: CPT

## 2021-02-11 ENCOUNTER — HOSPITAL ENCOUNTER (OUTPATIENT)
Dept: CARDIAC REHAB | Facility: CLINIC | Age: 51
End: 2021-02-11
Attending: PHYSICIAN ASSISTANT
Payer: COMMERCIAL

## 2021-02-11 PROCEDURE — 93798 PHYS/QHP OP CAR RHAB W/ECG: CPT

## 2021-02-11 PROCEDURE — 999N000109 HC STATISTIC OP CR VISIT

## 2021-02-12 ENCOUNTER — TELEPHONE (OUTPATIENT)
Dept: OTHER | Facility: CLINIC | Age: 51
End: 2021-02-12

## 2021-02-12 NOTE — TELEPHONE ENCOUNTER
Patient called stating his BP is consistently in the the 90's/60's, HR 71. Denies dizziness but feels sluggish. Discussed with Nemo Oconnell PA-C. Will decrease Norvasc to 5 mg daily from 7.5 mg. Questioning diet restrictions. Recommended Mediterranean diet and discussing nutrition classes with Cardiac rehabilitation. All questions addressed.

## 2021-02-15 ENCOUNTER — HOSPITAL ENCOUNTER (OUTPATIENT)
Dept: CARDIAC REHAB | Facility: CLINIC | Age: 51
End: 2021-02-15
Attending: PHYSICIAN ASSISTANT
Payer: COMMERCIAL

## 2021-02-15 PROCEDURE — 999N000109 HC STATISTIC OP CR VISIT

## 2021-02-15 PROCEDURE — 93798 PHYS/QHP OP CAR RHAB W/ECG: CPT

## 2021-02-18 ENCOUNTER — HOSPITAL ENCOUNTER (OUTPATIENT)
Dept: CARDIAC REHAB | Facility: CLINIC | Age: 51
End: 2021-02-18
Attending: PHYSICIAN ASSISTANT
Payer: COMMERCIAL

## 2021-02-18 ENCOUNTER — APPOINTMENT (OUTPATIENT)
Dept: CT IMAGING | Facility: CLINIC | Age: 51
End: 2021-02-18
Attending: EMERGENCY MEDICINE
Payer: COMMERCIAL

## 2021-02-18 ENCOUNTER — HOSPITAL ENCOUNTER (EMERGENCY)
Facility: CLINIC | Age: 51
Discharge: HOME | End: 2021-02-18
Attending: EMERGENCY MEDICINE | Admitting: EMERGENCY MEDICINE
Payer: COMMERCIAL

## 2021-02-18 VITALS
SYSTOLIC BLOOD PRESSURE: 112 MMHG | HEIGHT: 74 IN | DIASTOLIC BLOOD PRESSURE: 63 MMHG | TEMPERATURE: 97.4 F | BODY MASS INDEX: 30.29 KG/M2 | OXYGEN SATURATION: 99 % | HEART RATE: 71 BPM | WEIGHT: 236 LBS | RESPIRATION RATE: 18 BRPM

## 2021-02-18 DIAGNOSIS — R55 VASOVAGAL NEAR SYNCOPE: ICD-10-CM

## 2021-02-18 DIAGNOSIS — N21.9 CALCULUS OF LOWER URINARY TRACT: ICD-10-CM

## 2021-02-18 DIAGNOSIS — R31.9 HEMATURIA, UNSPECIFIED TYPE: ICD-10-CM

## 2021-02-18 LAB
ALBUMIN UR-MCNC: 30 MG/DL
ANION GAP SERPL CALCULATED.3IONS-SCNC: 8 MMOL/L (ref 3–14)
APPEARANCE UR: ABNORMAL
BACTERIA #/AREA URNS HPF: ABNORMAL /HPF
BASOPHILS # BLD AUTO: 0.1 10E9/L (ref 0–0.2)
BASOPHILS NFR BLD AUTO: 0.8 %
BILIRUB UR QL STRIP: NEGATIVE
BUN SERPL-MCNC: 14 MG/DL (ref 7–30)
CALCIUM SERPL-MCNC: 9.9 MG/DL (ref 8.5–10.1)
CHLORIDE SERPL-SCNC: 108 MMOL/L (ref 94–109)
CO2 SERPL-SCNC: 24 MMOL/L (ref 20–32)
COLOR UR AUTO: YELLOW
CREAT SERPL-MCNC: 0.87 MG/DL (ref 0.66–1.25)
DIFFERENTIAL METHOD BLD: NORMAL
EOSINOPHIL # BLD AUTO: 0.2 10E9/L (ref 0–0.7)
EOSINOPHIL NFR BLD AUTO: 2.5 %
ERYTHROCYTE [DISTWIDTH] IN BLOOD BY AUTOMATED COUNT: 12 % (ref 10–15)
GFR SERPL CREATININE-BSD FRML MDRD: >90 ML/MIN/{1.73_M2}
GLUCOSE SERPL-MCNC: 137 MG/DL (ref 70–99)
GLUCOSE UR STRIP-MCNC: NEGATIVE MG/DL
HCT VFR BLD AUTO: 50 % (ref 40–53)
HGB BLD-MCNC: 16.1 G/DL (ref 13.3–17.7)
HGB UR QL STRIP: ABNORMAL
HYALINE CASTS #/AREA URNS LPF: 10 /LPF (ref 0–2)
IMM GRANULOCYTES # BLD: 0.1 10E9/L (ref 0–0.4)
IMM GRANULOCYTES NFR BLD: 0.6 %
KETONES UR STRIP-MCNC: NEGATIVE MG/DL
LEUKOCYTE ESTERASE UR QL STRIP: NEGATIVE
LYMPHOCYTES # BLD AUTO: 2.5 10E9/L (ref 0.8–5.3)
LYMPHOCYTES NFR BLD AUTO: 28 %
MCH RBC QN AUTO: 28.7 PG (ref 26.5–33)
MCHC RBC AUTO-ENTMCNC: 32.2 G/DL (ref 31.5–36.5)
MCV RBC AUTO: 89 FL (ref 78–100)
MONOCYTES # BLD AUTO: 0.7 10E9/L (ref 0–1.3)
MONOCYTES NFR BLD AUTO: 7.7 %
MUCOUS THREADS #/AREA URNS LPF: PRESENT /LPF
NEUTROPHILS # BLD AUTO: 5.5 10E9/L (ref 1.6–8.3)
NEUTROPHILS NFR BLD AUTO: 60.4 %
NITRATE UR QL: NEGATIVE
NRBC # BLD AUTO: 0 10*3/UL
NRBC BLD AUTO-RTO: 0 /100
PH UR STRIP: 6.5 PH (ref 5–7)
PLATELET # BLD AUTO: 331 10E9/L (ref 150–450)
POTASSIUM SERPL-SCNC: 4.2 MMOL/L (ref 3.4–5.3)
RBC # BLD AUTO: 5.61 10E12/L (ref 4.4–5.9)
RBC #/AREA URNS AUTO: >182 /HPF (ref 0–2)
SODIUM SERPL-SCNC: 140 MMOL/L (ref 133–144)
SOURCE: ABNORMAL
SP GR UR STRIP: 1.02 (ref 1–1.03)
TROPONIN I BLD-MCNC: 0 UG/L (ref 0–0.08)
TROPONIN I SERPL-MCNC: <0.015 UG/L (ref 0–0.04)
UROBILINOGEN UR STRIP-MCNC: NORMAL MG/DL (ref 0–2)
WBC # BLD AUTO: 9.1 10E9/L (ref 4–11)
WBC #/AREA URNS AUTO: 6 /HPF (ref 0–5)

## 2021-02-18 PROCEDURE — 93798 PHYS/QHP OP CAR RHAB W/ECG: CPT | Performed by: REHABILITATION PRACTITIONER

## 2021-02-18 PROCEDURE — 74178 CT ABD&PLV WO CNTR FLWD CNTR: CPT

## 2021-02-18 PROCEDURE — 87086 URINE CULTURE/COLONY COUNT: CPT | Performed by: EMERGENCY MEDICINE

## 2021-02-18 PROCEDURE — 80048 BASIC METABOLIC PNL TOTAL CA: CPT | Performed by: EMERGENCY MEDICINE

## 2021-02-18 PROCEDURE — 85025 COMPLETE CBC W/AUTO DIFF WBC: CPT | Performed by: EMERGENCY MEDICINE

## 2021-02-18 PROCEDURE — 81001 URINALYSIS AUTO W/SCOPE: CPT | Performed by: EMERGENCY MEDICINE

## 2021-02-18 PROCEDURE — 84484 ASSAY OF TROPONIN QUANT: CPT

## 2021-02-18 PROCEDURE — 99285 EMERGENCY DEPT VISIT HI MDM: CPT | Mod: 25

## 2021-02-18 PROCEDURE — 84484 ASSAY OF TROPONIN QUANT: CPT | Mod: 91 | Performed by: EMERGENCY MEDICINE

## 2021-02-18 PROCEDURE — 250N000011 HC RX IP 250 OP 636: Performed by: EMERGENCY MEDICINE

## 2021-02-18 PROCEDURE — 250N000009 HC RX 250: Performed by: EMERGENCY MEDICINE

## 2021-02-18 PROCEDURE — 84484 ASSAY OF TROPONIN QUANT: CPT | Performed by: EMERGENCY MEDICINE

## 2021-02-18 PROCEDURE — 93005 ELECTROCARDIOGRAM TRACING: CPT

## 2021-02-18 RX ORDER — IOPAMIDOL 755 MG/ML
500 INJECTION, SOLUTION INTRAVASCULAR ONCE
Status: COMPLETED | OUTPATIENT
Start: 2021-02-18 | End: 2021-02-18

## 2021-02-18 RX ADMIN — SODIUM CHLORIDE 98 ML: 9 INJECTION, SOLUTION INTRAVENOUS at 13:38

## 2021-02-18 RX ADMIN — IOPAMIDOL 100 ML: 755 INJECTION, SOLUTION INTRAVENOUS at 13:37

## 2021-02-18 ASSESSMENT — ENCOUNTER SYMPTOMS
SHORTNESS OF BREATH: 0
APPETITE CHANGE: 0
VOMITING: 0

## 2021-02-18 ASSESSMENT — MIFFLIN-ST. JEOR: SCORE: 1990.24

## 2021-02-18 NOTE — ED NOTES
Pt got out of bed and walked independently to restroom with steady gait and no complaints of dizziness or lightheadedness.

## 2021-02-18 NOTE — ED PROVIDER NOTES
History   Chief Complaint:  Near syncope     The history is provided by the patient.      Mor Monson is a 50 year old male with history of CAD s/p double bypass 6 weeks prior who presents with dizziness while working out at cardiac rehab.  Notes he had a normal morning.  During the middle of workout he started to feel lightheaded and nauseous.  He did not lose consciousness but staff noted that he seemed close to passing out.  He notes he was feeling well prior to going to cardiac rehab and has been doing well at rehab since his bypass.  He denies any history of similar symptoms in the past.  He currently is asymptomatic.  Denies vomiting, chest pain, leg swelling, shortness of breath, appetite changes, being dehydrated, smoking or recreational drug use.  Has not used alcohol since his bypass surgery.    Later in his stay, the patient also voiced a concern for burning pain at the tip of his penis.  Is primarily with urination.  Denies any hematuria.  Denies abdominal pain.  He has a history of kidney stones but denies flank pain at any time recently.    Review of Systems   Constitutional: Negative for appetite change.   Respiratory: Negative for shortness of breath.    Cardiovascular: Negative for chest pain and leg swelling.   Gastrointestinal: Negative for vomiting.   Genitourinary: Positive for penile pain.   Neurological:        Near syncope.    All other systems reviewed and are negative.      Allergies:  No Known Allergies    Medications:  Norvasc  aspirin   doxycycline hyclate   methocarbamol   metoprolol tartrate  Oxycodone   rosuvastatin   senna-docusate     Past Medical History:    Coronary artery disease    Fluid overload   Transient hyperglycemia post procedure   Unstable angina   Depression    generalized anxiety disorder    Hypertension     Past Surgical History:    Bypass graft artery coronary   CV coronary angiogram   CV left  Heart cath   CV intra aortic balloon pump insertion      Family  "History:    Father: diabetes mellitus     Social History:  Presents alone.    PCP:  Asad Goodwin   Non-smoker.  No alcohol use currently.  No recreational drug use.    Physical Exam     Patient Vitals for the past 24 hrs:   BP Temp Temp src Pulse Resp SpO2 Height Weight   02/18/21 1215 -- -- -- -- -- 100 % -- --   02/18/21 1200 -- -- -- -- -- 100 % -- --   02/18/21 1130 104/68 -- -- 67 -- 100 % -- --   02/18/21 1115 113/72 -- -- 63 -- 100 % -- --   02/18/21 1100 111/76 -- -- 66 -- 100 % -- --   02/18/21 1044 124/76 97.4  F (36.3  C) Oral 76 18 100 % 1.88 m (6' 2\") 107 kg (236 lb)       Physical Exam  General: Adult male laying on the stretcher  Eyes: PERRL, Conjunctive within normal limits  ENT: Moist mucous membranes, oropharynx clear.   CV: Normal S1S2, no murmur, rub or gallop. Regular rate and rhythm.  Occasional irregular beats.  Resp: Clear to auscultation bilaterally, no wheezes, rales or rhonchi. Normal respiratory effort.  GI: Abdomen is soft, nontender and nondistended. No palpable masses. No rebound or guarding.  : Circumcised.  Penis is nontender to palpation.  Urethral meatus unremarkable.  No erythema, edema or discharge.  MSK: No edema. Nontender. Normal active range of motion.  Skin: Warm and dry. No rashes or lesions or ecchymoses on visible skin.  Neuro: Alert and oriented. Responds appropriately to all questions and commands. No focal findings appreciated. Normal muscle tone.  Psych: Normal mood and affect. Pleasant.    Emergency Department Course     ECG:  ECG taken at 1041, ECG read at 1042  Sinus rhythm With premature supraventricular complexes and occasional premature ventricular complexes   Possible inferior infarct, age undetermined   Abnormal ECG  Rate 72 bpm. ID interval 156 ms. QRS duration 90 ms. QT/QTc 386/422 ms. P-R-T axes 55 45 13.    Imaging:  CT Abdomen Pelvis w/o & w Contrast  IMPRESSION:   1.  0.3 cm left ureterovesical junction stone just in the bladder  lumen. No " hydronephrosis.  2.  Tiny nonobstructing stone within the right kidney.  3.  Coronary artery calcifications and small pericardial effusion.  4.  Cholelithiasis.  Reading per radiology    Laboratory:  CBC: WBC 9.1, HGB 16.1,    BMP: Glucose  137 (H), Creatinine 0.87 o/w WNL  UA with microscopic: blood moderate, protein Albumin 30, WBC/HPF  6(H), RBC/HPF >182(H), bacteria few, mucosus present, hyaline casts 10(H)  o/w WNL    Troponin POCT: 0.00  Troponin: (Collected 1052) <0.015  Urine Culture Aerobic Bacterial pending     Emergency Department Course:    Reviewed:  I reviewed nursing notes, vitals and past medical history    Assessments:  1043 I obtained history and examined the patient as noted above.   1224 I rechecked the patient and explained findings.     Consults:   1133  I spoke with Dr. Lisa of the Cardiology  service from [facility] regarding patient's presentation, findings, and plan of care.    Disposition:  The patient was discharged to home.     Impression & Plan     Medical Decision Making:  Mor Monson is a 51 y/o male with a history of double bypass 6 weeks ago who presents emergency department for concerns of a syncopal event with symptoms of lightheadedness and nausea while at cardiac rehab.  He had no associated chest pain or shortness of breath.  On telemetry strips from cardiac rehab he appeared to have slow bradycardia down to a rate of about 30.  I suspect a vasovagal event just based on the symptoms he described.  Without chest pain or shortness of breath, anginal equivalent seems unlikely.  He had no findings of acute ischemia or injury on ECG.  He felt at baseline and assessment here in the ED.  He had no dysrhythmia noted on telemetry while in the emergency department.  Laboratory assessment is reassuring.  He is ambulatory without difficulty.  He noted incidentally some dysuria and therefore evaluation was undertaken for cause for dysuria.  Urinalysis showed hematuria for  which CT scan was ordered.  This showed evidence of a stone that probably just passed into the bladder.  It is possible that a passing stone may have triggered a vagal event as well earlier.  At this time, the patient seems reasonable for discharge home.  I discussed his care with Dr. Lisa of cardiology soon after arrival.  Given the patient's heart rate now, asymptomatic status, and benign sounding cause for the episode earlier, he also felt comfortable patient going home for ongoing outpatient follow-up with cardiology and rehab.  Patient is recommended to return to me to emergency department should symptoms recur or worsen.  Follow-up as discussed with cardiology primary clinic.      Diagnosis:    ICD-10-CM    1. Vasovagal near syncope  R55 UA with Microscopic     Urine Culture Aerobic Bacterial   2. Hematuria, unspecified type  R31.9    3. Calculus of lower urinary tract  N21.9          Scribe Disclosure:  I, Zulma Palm, am serving as a scribe at 10:45 AM on 2/18/2021 to document services personally performed by Daxa Jenkins MD based on my observations and the provider's statements to me.        Daxa Jenkins MD  02/18/21 2537

## 2021-02-18 NOTE — ED TRIAGE NOTES
Pt was at cardiac rehab, 6 weeks post-cab and felt sudden dizziness, nausea, and was diaphoretic. Pt sat down and rapid response was called. Pt was brought to ED for eval.

## 2021-02-19 ENCOUNTER — TELEPHONE (OUTPATIENT)
Dept: OTHER | Facility: CLINIC | Age: 51
End: 2021-02-19

## 2021-02-19 LAB
BACTERIA SPEC CULT: NO GROWTH
INTERPRETATION ECG - MUSE: NORMAL
Lab: NORMAL
SPECIMEN SOURCE: NORMAL

## 2021-02-19 NOTE — TELEPHONE ENCOUNTER
Patient's wife called stating pt passed out at cardiac rehabilitation. Hypotension. He was sent to the ED. BP was 100/68 63. States he also felt warm and lightheaded this am after am med's. He also had a CT scan showing a kidney stone. He felt he passed it last night. UA/UC sent in ED. Culture pending. Discussed with Edna Beal PA-C. Will decrease Amlodipine to 2.5 mg daily. Will follow BP over the weekend and assess need to titrate to Metoprolol. Will follow UC results.

## 2021-02-22 ENCOUNTER — HOSPITAL ENCOUNTER (OUTPATIENT)
Dept: CARDIAC REHAB | Facility: CLINIC | Age: 51
End: 2021-02-22
Attending: PHYSICIAN ASSISTANT
Payer: COMMERCIAL

## 2021-02-22 PROCEDURE — 93798 PHYS/QHP OP CAR RHAB W/ECG: CPT

## 2021-02-25 ENCOUNTER — HOSPITAL ENCOUNTER (OUTPATIENT)
Dept: CARDIAC REHAB | Facility: CLINIC | Age: 51
End: 2021-02-25
Attending: PHYSICIAN ASSISTANT
Payer: COMMERCIAL

## 2021-02-25 PROCEDURE — 93798 PHYS/QHP OP CAR RHAB W/ECG: CPT | Performed by: REHABILITATION PRACTITIONER

## 2021-02-25 PROCEDURE — 999N000109 HC STATISTIC OP CR VISIT: Performed by: REHABILITATION PRACTITIONER

## 2021-02-26 ENCOUNTER — TELEPHONE (OUTPATIENT)
Dept: OTHER | Facility: CLINIC | Age: 51
End: 2021-02-26

## 2021-02-26 NOTE — TELEPHONE ENCOUNTER
Patient sent pictures to e-mail of incision. Does not look infected. Questioning in grown hair. Will monitor for increased drainage,redness or pain. Will call if changes and will evaluate with CT a clinic appointment.

## 2021-02-26 NOTE — TELEPHONE ENCOUNTER
Patients called stating he has stated he has had hiccups in his heart beat. Rehab session reviewed showing rare PAC's. Pt on Metoprolol. Denies dizziness. She also stated his BP was low in rehab yesterday. Note not in system yet. No notification form therapist. Will follow up. She also stated he has 3 areas on his sternal incision that drain at night and are then crusted in am. Requested a picture be sent to e-mail for evaluation. No fevers.

## 2021-03-01 ENCOUNTER — HOSPITAL ENCOUNTER (OUTPATIENT)
Dept: CARDIAC REHAB | Facility: CLINIC | Age: 51
End: 2021-03-01
Attending: PHYSICIAN ASSISTANT
Payer: COMMERCIAL

## 2021-03-01 PROCEDURE — 93798 PHYS/QHP OP CAR RHAB W/ECG: CPT

## 2021-03-04 ENCOUNTER — HOSPITAL ENCOUNTER (OUTPATIENT)
Dept: CARDIAC REHAB | Facility: CLINIC | Age: 51
End: 2021-03-04
Attending: PHYSICIAN ASSISTANT
Payer: COMMERCIAL

## 2021-03-04 ENCOUNTER — TELEPHONE (OUTPATIENT)
Dept: OTHER | Facility: CLINIC | Age: 51
End: 2021-03-04

## 2021-03-04 PROCEDURE — 93798 PHYS/QHP OP CAR RHAB W/ECG: CPT | Performed by: REHABILITATION PRACTITIONER

## 2021-03-04 NOTE — TELEPHONE ENCOUNTER
Patient's wife e-mailed pictures of pt's incision. Superficial scab noted mid sternal incision with occasional thin yellow drng noted. She states he frequently gets an ingrown hair in this area. No redness noted. Encouraged washing with antibacterial soap daily and call if redness or purulent drainage. Instructed to avoid hot tubs or baths till completely healed.

## 2021-03-08 ENCOUNTER — HOSPITAL ENCOUNTER (OUTPATIENT)
Dept: CARDIAC REHAB | Facility: CLINIC | Age: 51
End: 2021-03-08
Attending: PHYSICIAN ASSISTANT
Payer: COMMERCIAL

## 2021-03-08 PROCEDURE — 93798 PHYS/QHP OP CAR RHAB W/ECG: CPT

## 2021-03-08 PROCEDURE — 999N000109 HC STATISTIC OP CR VISIT

## 2021-03-11 ENCOUNTER — HOSPITAL ENCOUNTER (OUTPATIENT)
Dept: CARDIAC REHAB | Facility: CLINIC | Age: 51
End: 2021-03-11
Attending: PHYSICIAN ASSISTANT
Payer: COMMERCIAL

## 2021-03-11 PROCEDURE — 93798 PHYS/QHP OP CAR RHAB W/ECG: CPT

## 2021-03-15 ENCOUNTER — HOSPITAL ENCOUNTER (OUTPATIENT)
Dept: CARDIAC REHAB | Facility: CLINIC | Age: 51
End: 2021-03-15
Attending: PHYSICIAN ASSISTANT
Payer: COMMERCIAL

## 2021-03-15 PROCEDURE — 93798 PHYS/QHP OP CAR RHAB W/ECG: CPT

## 2021-03-18 ENCOUNTER — HOSPITAL ENCOUNTER (OUTPATIENT)
Dept: CARDIAC REHAB | Facility: CLINIC | Age: 51
End: 2021-03-18
Attending: PHYSICIAN ASSISTANT
Payer: COMMERCIAL

## 2021-03-18 PROCEDURE — 999N000109 HC STATISTIC OP CR VISIT: Performed by: REHABILITATION PRACTITIONER

## 2021-03-18 PROCEDURE — 93798 PHYS/QHP OP CAR RHAB W/ECG: CPT | Performed by: REHABILITATION PRACTITIONER

## 2021-03-22 ENCOUNTER — HOSPITAL ENCOUNTER (OUTPATIENT)
Dept: CARDIAC REHAB | Facility: CLINIC | Age: 51
End: 2021-03-22
Attending: PHYSICIAN ASSISTANT
Payer: COMMERCIAL

## 2021-03-22 PROCEDURE — 93798 PHYS/QHP OP CAR RHAB W/ECG: CPT

## 2021-03-24 ENCOUNTER — HOSPITAL ENCOUNTER (OUTPATIENT)
Dept: CARDIAC REHAB | Facility: CLINIC | Age: 51
End: 2021-03-24
Attending: PHYSICIAN ASSISTANT
Payer: COMMERCIAL

## 2021-03-24 ENCOUNTER — OFFICE VISIT (OUTPATIENT)
Dept: CARDIOLOGY | Facility: CLINIC | Age: 51
End: 2021-03-24
Payer: COMMERCIAL

## 2021-03-24 VITALS
BODY MASS INDEX: 33.18 KG/M2 | WEIGHT: 245 LBS | HEIGHT: 72 IN | DIASTOLIC BLOOD PRESSURE: 87 MMHG | SYSTOLIC BLOOD PRESSURE: 132 MMHG | HEART RATE: 55 BPM

## 2021-03-24 DIAGNOSIS — Z95.1 S/P CABG (CORONARY ARTERY BYPASS GRAFT): Primary | ICD-10-CM

## 2021-03-24 DIAGNOSIS — I77.810 MILD ASCENDING AORTA DILATION (H): ICD-10-CM

## 2021-03-24 DIAGNOSIS — Z13.6 SCREENING FOR CARDIOVASCULAR CONDITION: ICD-10-CM

## 2021-03-24 DIAGNOSIS — R00.2 PALPITATIONS: ICD-10-CM

## 2021-03-24 DIAGNOSIS — R00.1 BRADYCARDIA: ICD-10-CM

## 2021-03-24 DIAGNOSIS — I25.110 CORONARY ARTERY DISEASE INVOLVING NATIVE CORONARY ARTERY OF NATIVE HEART WITH UNSTABLE ANGINA PECTORIS (H): ICD-10-CM

## 2021-03-24 DIAGNOSIS — R00.2 PALPITATIONS: Primary | ICD-10-CM

## 2021-03-24 PROCEDURE — 93797 PHYS/QHP OP CAR RHAB WO ECG: CPT

## 2021-03-24 PROCEDURE — 99214 OFFICE O/P EST MOD 30 MIN: CPT | Performed by: INTERNAL MEDICINE

## 2021-03-24 PROCEDURE — 93798 PHYS/QHP OP CAR RHAB W/ECG: CPT

## 2021-03-24 PROCEDURE — 93000 ELECTROCARDIOGRAM COMPLETE: CPT | Performed by: INTERNAL MEDICINE

## 2021-03-24 RX ORDER — METOPROLOL SUCCINATE 50 MG/1
75 TABLET, EXTENDED RELEASE ORAL DAILY
Qty: 90 TABLET | Refills: 11 | Status: SHIPPED | OUTPATIENT
Start: 2021-03-24 | End: 2022-04-05

## 2021-03-24 RX ORDER — AMLODIPINE BESYLATE 2.5 MG/1
2.5 TABLET ORAL DAILY
Qty: 90 TABLET | Refills: 11 | Status: SHIPPED | OUTPATIENT
Start: 2021-03-24 | End: 2022-02-01

## 2021-03-24 SDOH — HEALTH STABILITY: MENTAL HEALTH: HOW OFTEN DO YOU HAVE A DRINK CONTAINING ALCOHOL?: NOT ASKED

## 2021-03-24 SDOH — HEALTH STABILITY: MENTAL HEALTH: HOW MANY STANDARD DRINKS CONTAINING ALCOHOL DO YOU HAVE ON A TYPICAL DAY?: NOT ASKED

## 2021-03-24 SDOH — HEALTH STABILITY: MENTAL HEALTH: HOW OFTEN DO YOU HAVE 6 OR MORE DRINKS ON ONE OCCASION?: NOT ASKED

## 2021-03-24 ASSESSMENT — MIFFLIN-ST. JEOR: SCORE: 1999.31

## 2021-03-24 NOTE — LETTER
3/24/2021    Asad ERICK Christa  [address] [facility] [address]    RE: Mor Monson       Dear Colleague,    I had the pleasure of seeing Mor Monson in the [facility].    Cardiology Clinic Progress Note:    March 24, 2021   Patient Name: Mor Monson  Patient MRN: [id number]     Consult reason: CAD with CABG    HPI and Assessment and Plan/Recommendations:    Please see dictation: [id number]    Thank you for allowing our team to participate in the care of Mor Monson.  Please do not hesitate to call or page me with any questions or concerns.    Sincerely,     Jean Méndez MD, [facility]  Cardiology  Text Page   March 24, 2021    Past Medical History:   The 10-year ASCVD risk score (Nikobriana GARCIA Jr., et al., 2013) is: 5.8%    Values used to calculate the score:      Age: 50 years      Sex: Male      Is Non- : No      Diabetic: No      Tobacco smoker: No      Systolic Blood Pressure: 132 mmHg      Is BP treated: No      HDL Cholesterol: 45 mg/dL      Total Cholesterol: 226 mg/dL  Patient Active Problem List   Diagnosis     Unstable angina (H)     Coronary artery disease     Coronary artery disease involving native coronary artery of native heart with unstable angina pectoris (H)     S/P CABG (coronary artery bypass graft)     Fluid overload     Transient hyperglycemia post procedure       Past Surgical History:   Past Surgical History:   Procedure Laterality Date     BYPASS GRAFT ARTERY CORONARY N/A 1/9/2021    Procedure: CORONARY ARTERY BYPASS GRAFT (CABG), On pump, Fermin, Radial vein graft LIMA-LAD Radial artery graft-OM;  Surgeon: Jacky Rojas MD;  Location: [facility] OR     CV CORONARY ANGIOGRAM N/A 1/8/2021    Procedure: Coronary Angiogram;  Surgeon: Gabino Quevedo MD;  Location:  HEART CARDIAC CATH LAB     CV INTRA-AORTIC BALLOON PUMP INSERTION N/A 1/9/2021    Procedure: Intra-Aortic Balloon;   Surgeon: Mor Singh MD;  Location:  HEART CARDIAC CATH LAB     CV LEFT HEART CATH N/A 1/8/2021    Procedure: Left Heart Cath;  Surgeon: Gabino Quevedo MD;  Location:  HEART CARDIAC CATH LAB       Medications (outpatient):  Current Outpatient Medications   Medication Sig Dispense Refill     acetaminophen (TYLENOL) 325 MG tablet Take 2 tablets (650 mg) by mouth every 4 hours as needed for mild pain 40 tablet 0     amLODIPine (NORVASC) 5 MG tablet Take 1.5 tablets (7.5 mg) by mouth daily (Patient taking differently: Pt taking 1/2 pill daily) 30 tablet 3     aspirin (ASA) 325 MG EC tablet Take 1 tablet (325 mg) by mouth daily 30 tablet 0     ciprofloxacin-dexamethasone (CIPRODEX) 0.3-0.1 % otic suspension Place 4 drops into both ears 2 times daily as needed       doxycycline hyclate (VIBRA-TABS) 100 MG tablet Take 100 mg by mouth 2 times daily as needed (eye infection)       metoprolol tartrate (LOPRESSOR) 50 MG tablet Take 1 tablet (50 mg) by mouth 2 times daily 60 tablet 3     rosuvastatin (CRESTOR) 20 MG tablet Take 1 tablet (20 mg) by mouth daily 30 tablet 3     methocarbamol (ROBAXIN) 750 MG tablet Take 1 tablet (750 mg) by mouth 4 times daily as needed for muscle spasms (Patient not taking: Reported on 3/24/2021) 40 tablet 0     oxyCODONE (ROXICODONE) 5 MG tablet Take 1-2 tablets (5-10 mg) by mouth every 6 hours as needed for moderate to severe pain (Patient not taking: Reported on 3/24/2021) 40 tablet 0     polyethylene glycol (MIRALAX) 17 GM/Dose powder Take 17 g by mouth daily (Patient not taking: Reported on 3/24/2021) 510 g 0     senna-docusate (SENOKOT-S/PERICOLACE) 8.6-50 MG tablet Take 2 tablets by mouth 2 times daily as needed for constipation (Patient not taking: Reported on 3/24/2021) 30 tablet 0       Allergies:  No Known Allergies    Social History:   History   Drug Use Not on file      History   Smoking Status     Never Smoker   Smokeless Tobacco     Never Used     Social History     Substance and Sexual Activity      Alcohol use: Yes        Comment: on weekends       Family History:  History reviewed. No pertinent family history.    Review of Systems:   A complete review of systems was negative except as mentioned in the History of Present Illness.     Objective & Physical Exam:  /87   Pulse 55   Ht 1.829 m (6')   Wt 111.1 kg (245 lb)   BMI 33.23 kg/m    Wt Readings from Last 2 Encounters:   03/24/21 111.1 kg (245 lb)   02/18/21 107 kg (236 lb)     Body mass index is 33.23 kg/m .   Body surface area is 2.38 meters squared.    Constitutional: appears stated age, in no apparent distress, appears to be well nourished  Eyes: sclera anicteric, conjunctiva normal  ENT: normocephalic, without obvious abnormality, atraumatic  Pulmonary: clear to auscultation bilaterally, no wheezes, no rales, no increased work of breathing  Cardiovascular: JVP normal, regular rate, regular rhythm, normal S1 and S2, no S3, S4, no murmur appreciated, no lower extremity edema, prior sternotomy wound healing well though with some areas of healing skin breakdown appears to be associated with ingrown hair from his chest, no tenderness or erythema, drainage, does not appear infected  Gastrointestinal: abdominal exam benign  Neurologic: awake, alert, face symmetrical, moves all extremities  Skin: no jaundice  Psychiatric: affect is normal, answers questions appropriately, oriented to self and place    Data reviewed:  Lab Results   Component Value Date    WBC 9.1 02/18/2021    RBC 5.61 02/18/2021    HGB 16.1 02/18/2021    HCT 50.0 02/18/2021    MCV 89 02/18/2021    MCH 28.7 02/18/2021    MCHC 32.2 02/18/2021    RDW 12.0 02/18/2021     02/18/2021     Sodium   Date Value Ref Range Status   02/18/2021 140 133 - 144 mmol/L Final     Potassium   Date Value Ref Range Status   02/18/2021 4.2 3.4 - 5.3 mmol/L Final     Chloride   Date Value Ref Range Status   02/18/2021 108 94 - 109 mmol/L Final     Carbon Dioxide    Date Value Ref Range Status   2021 24 20 - 32 mmol/L Final     Anion Gap   Date Value Ref Range Status   2021 8 3 - 14 mmol/L Final     Glucose   Date Value Ref Range Status   2021 137 (H) 70 - 99 mg/dL Final     Urea Nitrogen   Date Value Ref Range Status   2021 14 7 - 30 mg/dL Final     Creatinine   Date Value Ref Range Status   2021 0.87 0.66 - 1.25 mg/dL Final     GFR Estimate   Date Value Ref Range Status   2021 >90 >60 mL/min/[1.73_m2] Final     Comment:     Non  GFR Calc  Starting 2018, serum creatinine based estimated GFR (eGFR) will be   calculated using the Chronic Kidney Disease Epidemiology Collaboration   (CKD-EPI) equation.       Calcium   Date Value Ref Range Status   2021 9.9 8.5 - 10.1 mg/dL Final     Bilirubin Total   Date Value Ref Range Status   01/10/2021 0.9 0.2 - 1.3 mg/dL Final     Alkaline Phosphatase   Date Value Ref Range Status   01/10/2021 42 40 - 150 U/L Final     ALT   Date Value Ref Range Status   01/10/2021 25 0 - 70 U/L Final     AST   Date Value Ref Range Status   01/10/2021 38 0 - 45 U/L Final     Recent Labs   Lab Test 21  0954   CHOL 226*   HDL 45   *   TRIG 108      Lab Results   Component Value Date    A1C 5.5 2021        Recent Results (from the past 4320 hour(s))   Echocardiogram Complete    Narrative    [id number]  [id number]  [id number]  [id number]^BIBIANA^DEMETRIO^NELL           [facility]  Echocardiography Laboratory  [address] [place] [address]        Name: SONYA KERNS  MRN: [id number]  : 1970  Study Date: 2021 08:06 AM  Age: 50 yrs  Gender: Male  Patient Location: [facility]  Reason For Study: Chest Pain  Ordering Physician: DEMETRIO MCCARTY  Performed By: Alanis Rosen     BSA: 2.4 m2  Height: 72 in  Weight: 262 lb  HR: 74  BP: 137/82 mmHg  _____________________________________________________________________________  __        Procedure  Complete Portable Echo  Adult. Optison (NDC #3007-2579) given intravenously.  _____________________________________________________________________________  __        Interpretation Summary     The visual ejection fraction is estimated at 50-55%.  Left ventricular systolic function is borderline reduced.  There are regional wall motion abnormalities as specified.  Mild aortic root dilatation.  The ascending aorta is Mildly dilated.  The study was technically difficult.  _____________________________________________________________________________  __        Left Ventricle  The left ventricle is normal in size. There is normal left ventricular wall  thickness. Diastolic Doppler findings (E/E' ratio and/or other parameters)  suggest left ventricular filling pressures are indeterminate. The visual  ejection fraction is estimated at 50-55%. Left ventricular systolic function  is borderline reduced. The mid anterolateral and mid anterior wall appear  moderately hypokinetic. There are regional wall motion abnormalities as  specified.     Right Ventricle  The right ventricle is normal in size and function.     Atria  Normal left atrial size. Right atrial size is normal. There is no color  Doppler evidence of an atrial shunt.     Mitral Valve  The mitral valve leaflets are mildly thickened. There is mild (1+) mitral  regurgitation.        Tricuspid Valve  There is trace tricuspid regurgitation. The right ventricular systolic  pressure is approximated at 31.4 mmHg plus the right atrial pressure.     Aortic Valve  The aortic valve is trileaflet. There is mild trileaflet aortic sclerosis.  There is trace aortic regurgitation. No hemodynamically significant valvular  aortic stenosis.     Pulmonic Valve  There is no pulmonic valvular regurgitation. Normal pulmonic valve velocity.     Vessels  Mild aortic root dilatation. The ascending aorta is Mildly dilated. The  inferior vena cava was normal in size with preserved respiratory variability.  IVC  diameter <2.1 cm collapsing >50% with sniff suggests a normal RA pressure  of 3 mmHg.     Pericardium  There is no pericardial effusion.        Rhythm  Sinus rhythm was noted.  _____________________________________________________________________________  __  MMode/2D Measurements & Calculations     IVSd: 1.1 cm  LVIDd: 5.2 cm  LVIDs: 3.4 cm  LVPWd: 1.1 cm  FS: 34.8 %  LV mass(C)d: 227.4 grams  LV mass(C)dI: 95.2 grams/m2  Ao root diam: 4.3 cm  LA dimension: 4.2 cm  asc Aorta Diam: 4.3 cm  LA/Ao: 0.97  LVOT diam: 2.6 cm  LVOT area: 5.3 cm2     EF(MOD-bp): 53.2 %  LA Volume (BP): 88.9 ml  LA Volume Index (BP): 37.2 ml/m2     RWT: 0.44        Doppler Measurements & Calculations  MV E max rachel: 70.3 cm/sec  MV A max rachel: 50.9 cm/sec  MV E/A: 1.4  MV max PG: 3.5 mmHg  MV mean P.8 mmHg  MV V2 VTI: 25.8 cm  MV dec time: 0.25 sec  PA acc time: 0.11 sec  TR max rachel: 280.2 cm/sec  TR max P.4 mmHg  E/E' av.4  Lateral E/e': 8.4  Medial E/e': 10.4              _____________________________________________________________________________  __        Report approved by: Brie Malagon 2021 09:53 AM          Thank you for allowing me to participate in the care of your patient.      Sincerely,     Jean Méndez MD     [facility]    cc:   No referring provider defined for this encounter.

## 2021-03-24 NOTE — PATIENT INSTRUCTIONS
March 24, 2021    Thank you for allowing our Cardiology team to participate in your care.     Please note the following changes to your heart treatment plan:     Medication changes:   - stop metoprolol tartrate 50 mg twice daily  - start metoprolol succinate 75 mg daily    Tests to be done:  - ZioPatch (heart monitor)  - TTE (heart ultrasound)  - FASTING lipids in 6 months    Follow up:  - Follow up in 6 months, or sooner as needed.      Please contact our team at [phone] for any questions or concerns.   If you are having a medical emergency, please call 911.       Sincerely,    Jean Méndez MD, FACC  Cardiology    [facility] - [facility] - [facility] - [facility]

## 2021-03-24 NOTE — PROGRESS NOTES
Cardiology Clinic Progress Note:    March 24, 2021   Patient Name: Mor Monson  Patient MRN: [id number]     Consult reason: CAD with CABG    HPI and Assessment and Plan/Recommendations:    Please see dictation: [id number]    Thank you for allowing our team to participate in the care of Mor Monson.  Please do not hesitate to call or page me with any questions or concerns.    Sincerely,     Jean Méndez MD, [facility]  Cardiology  Text Page   March 24, 2021    Past Medical History:   The 10-year ASCVD risk score (Phippsburgbriana GARCIA Jr., et al., 2013) is: 5.8%    Values used to calculate the score:      Age: 50 years      Sex: Male      Is Non- : No      Diabetic: No      Tobacco smoker: No      Systolic Blood Pressure: 132 mmHg      Is BP treated: No      HDL Cholesterol: 45 mg/dL      Total Cholesterol: 226 mg/dL  Patient Active Problem List   Diagnosis     Unstable angina (H)     Coronary artery disease     Coronary artery disease involving native coronary artery of native heart with unstable angina pectoris (H)     S/P CABG (coronary artery bypass graft)     Fluid overload     Transient hyperglycemia post procedure       Past Surgical History:   Past Surgical History:   Procedure Laterality Date     BYPASS GRAFT ARTERY CORONARY N/A 1/9/2021    Procedure: CORONARY ARTERY BYPASS GRAFT (CABG), On pump, Fermin, Radial vein graft LIMA-LAD Radial artery graft-OM;  Surgeon: Jacky Rojas MD;  Location:  OR     CV CORONARY ANGIOGRAM N/A 1/8/2021    Procedure: Coronary Angiogram;  Surgeon: Gabino Quevedo MD;  Location:  HEART CARDIAC CATH LAB     CV INTRA-AORTIC BALLOON PUMP INSERTION N/A 1/9/2021    Procedure: Intra-Aortic Balloon;  Surgeon: Mor Singh MD;  Location:  HEART CARDIAC CATH LAB     CV LEFT HEART CATH N/A 1/8/2021    Procedure: Left Heart Cath;  Surgeon: Gabino Quevedo MD;  Location:  HEART CARDIAC CATH LAB       Medications  (outpatient):  Current Outpatient Medications   Medication Sig Dispense Refill     acetaminophen (TYLENOL) 325 MG tablet Take 2 tablets (650 mg) by mouth every 4 hours as needed for mild pain 40 tablet 0     amLODIPine (NORVASC) 5 MG tablet Take 1.5 tablets (7.5 mg) by mouth daily (Patient taking differently: Pt taking 1/2 pill daily) 30 tablet 3     aspirin (ASA) 325 MG EC tablet Take 1 tablet (325 mg) by mouth daily 30 tablet 0     ciprofloxacin-dexamethasone (CIPRODEX) 0.3-0.1 % otic suspension Place 4 drops into both ears 2 times daily as needed       doxycycline hyclate (VIBRA-TABS) 100 MG tablet Take 100 mg by mouth 2 times daily as needed (eye infection)       metoprolol tartrate (LOPRESSOR) 50 MG tablet Take 1 tablet (50 mg) by mouth 2 times daily 60 tablet 3     rosuvastatin (CRESTOR) 20 MG tablet Take 1 tablet (20 mg) by mouth daily 30 tablet 3     methocarbamol (ROBAXIN) 750 MG tablet Take 1 tablet (750 mg) by mouth 4 times daily as needed for muscle spasms (Patient not taking: Reported on 3/24/2021) 40 tablet 0     oxyCODONE (ROXICODONE) 5 MG tablet Take 1-2 tablets (5-10 mg) by mouth every 6 hours as needed for moderate to severe pain (Patient not taking: Reported on 3/24/2021) 40 tablet 0     polyethylene glycol (MIRALAX) 17 GM/Dose powder Take 17 g by mouth daily (Patient not taking: Reported on 3/24/2021) 510 g 0     senna-docusate (SENOKOT-S/PERICOLACE) 8.6-50 MG tablet Take 2 tablets by mouth 2 times daily as needed for constipation (Patient not taking: Reported on 3/24/2021) 30 tablet 0       Allergies:  No Known Allergies    Social History:   History   Drug Use Not on file      History   Smoking Status     Never Smoker   Smokeless Tobacco     Never Used     Social History    Substance and Sexual Activity      Alcohol use: Yes        Comment: on weekends       Family History:  History reviewed. No pertinent family history.    Review of Systems:   A complete review of systems was negative except as  mentioned in the History of Present Illness.     Objective & Physical Exam:  /87   Pulse 55   Ht 1.829 m (6')   Wt 111.1 kg (245 lb)   BMI 33.23 kg/m    Wt Readings from Last 2 Encounters:   03/24/21 111.1 kg (245 lb)   02/18/21 107 kg (236 lb)     Body mass index is 33.23 kg/m .   Body surface area is 2.38 meters squared.    Constitutional: appears stated age, in no apparent distress, appears to be well nourished  Eyes: sclera anicteric, conjunctiva normal  ENT: normocephalic, without obvious abnormality, atraumatic  Pulmonary: clear to auscultation bilaterally, no wheezes, no rales, no increased work of breathing  Cardiovascular: JVP normal, regular rate, regular rhythm, normal S1 and S2, no S3, S4, no murmur appreciated, no lower extremity edema, prior sternotomy wound healing well though with some areas of healing skin breakdown appears to be associated with ingrown hair from his chest, no tenderness or erythema, drainage, does not appear infected  Gastrointestinal: abdominal exam benign  Neurologic: awake, alert, face symmetrical, moves all extremities  Skin: no jaundice  Psychiatric: affect is normal, answers questions appropriately, oriented to self and place    Data reviewed:  Lab Results   Component Value Date    WBC 9.1 02/18/2021    RBC 5.61 02/18/2021    HGB 16.1 02/18/2021    HCT 50.0 02/18/2021    MCV 89 02/18/2021    MCH 28.7 02/18/2021    MCHC 32.2 02/18/2021    RDW 12.0 02/18/2021     02/18/2021     Sodium   Date Value Ref Range Status   02/18/2021 140 133 - 144 mmol/L Final     Potassium   Date Value Ref Range Status   02/18/2021 4.2 3.4 - 5.3 mmol/L Final     Chloride   Date Value Ref Range Status   02/18/2021 108 94 - 109 mmol/L Final     Carbon Dioxide   Date Value Ref Range Status   02/18/2021 24 20 - 32 mmol/L Final     Anion Gap   Date Value Ref Range Status   02/18/2021 8 3 - 14 mmol/L Final     Glucose   Date Value Ref Range Status   02/18/2021 137 (H) 70 - 99 mg/dL Final      Urea Nitrogen   Date Value Ref Range Status   2021 14 7 - 30 mg/dL Final     Creatinine   Date Value Ref Range Status   2021 0.87 0.66 - 1.25 mg/dL Final     GFR Estimate   Date Value Ref Range Status   2021 >90 >60 mL/min/[1.73_m2] Final     Comment:     Non  GFR Calc  Starting 2018, serum creatinine based estimated GFR (eGFR) will be   calculated using the Chronic Kidney Disease Epidemiology Collaboration   (CKD-EPI) equation.       Calcium   Date Value Ref Range Status   2021 9.9 8.5 - 10.1 mg/dL Final     Bilirubin Total   Date Value Ref Range Status   01/10/2021 0.9 0.2 - 1.3 mg/dL Final     Alkaline Phosphatase   Date Value Ref Range Status   01/10/2021 42 40 - 150 U/L Final     ALT   Date Value Ref Range Status   01/10/2021 25 0 - 70 U/L Final     AST   Date Value Ref Range Status   01/10/2021 38 0 - 45 U/L Final     Recent Labs   Lab Test 21  0954   CHOL 226*   HDL 45   *   TRIG 108      Lab Results   Component Value Date    A1C 5.5 2021        Recent Results (from the past 4320 hour(s))   Echocardiogram Complete    Narrative    [id number]  [id number]  [id number]  [id number]^BIBIANA^DEMETRIO^NELL           [facility]  Echocardiography Laboratory  [address] [place] [address]        Name: SONYA KERNS  MRN: [id number]  : 1970  Study Date: 2021 08:06 AM  Age: 50 yrs  Gender: Male  Patient Location: [facility]  Reason For Study: Chest Pain  Ordering Physician: DEMETRIO MCCARTY  Performed By: Alanis Rosen     BSA: 2.4 m2  Height: 72 in  Weight: 262 lb  HR: 74  BP: 137/82 mmHg  _____________________________________________________________________________  __        Procedure  Complete Portable Echo Adult. Optison (NDC #2333-0322) given intravenously.  _____________________________________________________________________________  __        Interpretation Summary     The visual ejection fraction is estimated at 50-55%.  Left  ventricular systolic function is borderline reduced.  There are regional wall motion abnormalities as specified.  Mild aortic root dilatation.  The ascending aorta is Mildly dilated.  The study was technically difficult.  _____________________________________________________________________________  __        Left Ventricle  The left ventricle is normal in size. There is normal left ventricular wall  thickness. Diastolic Doppler findings (E/E' ratio and/or other parameters)  suggest left ventricular filling pressures are indeterminate. The visual  ejection fraction is estimated at 50-55%. Left ventricular systolic function  is borderline reduced. The mid anterolateral and mid anterior wall appear  moderately hypokinetic. There are regional wall motion abnormalities as  specified.     Right Ventricle  The right ventricle is normal in size and function.     Atria  Normal left atrial size. Right atrial size is normal. There is no color  Doppler evidence of an atrial shunt.     Mitral Valve  The mitral valve leaflets are mildly thickened. There is mild (1+) mitral  regurgitation.        Tricuspid Valve  There is trace tricuspid regurgitation. The right ventricular systolic  pressure is approximated at 31.4 mmHg plus the right atrial pressure.     Aortic Valve  The aortic valve is trileaflet. There is mild trileaflet aortic sclerosis.  There is trace aortic regurgitation. No hemodynamically significant valvular  aortic stenosis.     Pulmonic Valve  There is no pulmonic valvular regurgitation. Normal pulmonic valve velocity.     Vessels  Mild aortic root dilatation. The ascending aorta is Mildly dilated. The  inferior vena cava was normal in size with preserved respiratory variability.  IVC diameter <2.1 cm collapsing >50% with sniff suggests a normal RA pressure  of 3 mmHg.     Pericardium  There is no pericardial effusion.        Rhythm  Sinus rhythm was  noted.  _____________________________________________________________________________  __  MMode/2D Measurements & Calculations     IVSd: 1.1 cm  LVIDd: 5.2 cm  LVIDs: 3.4 cm  LVPWd: 1.1 cm  FS: 34.8 %  LV mass(C)d: 227.4 grams  LV mass(C)dI: 95.2 grams/m2  Ao root diam: 4.3 cm  LA dimension: 4.2 cm  asc Aorta Diam: 4.3 cm  LA/Ao: 0.97  LVOT diam: 2.6 cm  LVOT area: 5.3 cm2     EF(MOD-bp): 53.2 %  LA Volume (BP): 88.9 ml  LA Volume Index (BP): 37.2 ml/m2     RWT: 0.44        Doppler Measurements & Calculations  MV E max rachel: 70.3 cm/sec  MV A max rachel: 50.9 cm/sec  MV E/A: 1.4  MV max PG: 3.5 mmHg  MV mean P.8 mmHg  MV V2 VTI: 25.8 cm  MV dec time: 0.25 sec  PA acc time: 0.11 sec  TR max rachel: 280.2 cm/sec  TR max P.4 mmHg  E/E' av.4  Lateral E/e': 8.4  Medial E/e': 10.4              _____________________________________________________________________________  __        Report approved by: Brie Malagon 2021 09:53 AM

## 2021-03-25 NOTE — PROGRESS NOTES
Service Date: 03/24/2021      CARDIOLOGY CLINIC PROGRESS NOTE      CONSULT INDICATION:  Coronary artery disease.      HISTORY OF PRESENT ILLNESS:      I had the opportunity to see patient Mor Monson today in Cardiology Clinic for a followup visit.  He is followed by our colleague Dr. Goodwin with Family Medicine.      As you know, Mr. Monson is a pleasant, 50-year-old male with a past medical history significant for coronary artery disease, status post CABG (left radial artery graft to OM, LIMA to LAD 01/09/2021 with Dr. Rojas), mildly dilated aortic root and ascending aorta, who presents for followup.      I had initially met Mr. Monson at [facility] when he presented for chest discomfort.  He works as a  and so is very physically active on a regular basis.  In the preceding months, he had noted left-sided chest discomfort as well as dyspnea on exertion.  In the emergency department, he was noted to be significantly hypertensive at 219/121 mmHg.  ECG demonstrated borderline LVH changes, but otherwise did not demonstrate any abnormalities, including acute ischemic abnormalities.  Troponins were normal.  TTE was done 01/08/2021 that was personally reviewed and demonstrated a mildly reduced LV function with anterior and anterolateral wall hypokinesis.  The patient underwent cardiac catheterization 01/08/2021 with Dr. Singh that demonstrated a severe ulcerative lesion in the distal left main extending into the ostial LAD and left circumflex as well as moderate disease in the RCA.  Ultimately, the patient was transferred to [facility] where he underwent coronary artery bypass surgery with Dr. Rojas 01/09/2021 with a left radial artery graft to the left circumflex OM and LIMA to LAD grafting.      Since then, Mr. Monson reports that overall he has been doing well.  He has been engaging in cardiac rehab as directed.  He denies any recurrence of chest pain,  chest discomfort or abnormal shortness of breath.  He does have some issues with ingrown hairs at the site of his sternotomy; however, it seems to be healing well.  He does note some dizziness/lightheadedness and some fatigue; however, denies any symptoms of presyncope or syncope.  Denies any loss of consciousness.  Postoperatively, he was noted to go into possible atrial fibrillation.  (Note by ADAM Nuñez, 01/11/2021 noted SVT versus atrial fibrillation overnight that was resolved with metoprolol.)  This was on postop day 2.  He has not had known recurrence of this tachycardia since then.      He is planning to go on vacation soon, inquires about activity restrictions, alcohol intake restrictions.      Blood pressure today in clinic was 132/87 mmHg.  Heart rate was 55 beats per minute.  An ECG was done earlier today in clinic that demonstrates sinus bradycardia, otherwise without acute ischemic changes, normal QTc at 396 msec.      ASSESSMENT AND PLAN/RECOMMENDATIONS:     1.  Coronary artery disease, presented with unstable angina 01/07/2021, status post cardiac catheterization 01/08/2021 demonstrating multivessel obstructive coronary artery disease, status post coronary artery bypass surgery with Dr. Rojas 01/09/2021.  Left radial artery graft to left circumflex and OM, LIMA to LAD.  Residual nonobstructive disease in the RCA.  Denies any symptoms concerning for angina or decompensated heart failure.   2.  Postoperative SVT versus atrial fibrillation, postop day 2, per chart review resolved with metoprolol, no evidence of recurrence since then.   3.  Bradycardia, ED visit from rehab 02/18/2021 with dizziness at cardiac rehab, bradycardia with rates in the 30s beats-per-minute range, thought to be due to vasovagal event.  Heart rate in clinic today 55 beats per minute.   4.  Hypertension.   5.  Hyperlipidemia.   on 01/08/2021.     - Overall, Mr. Monson seems to be in stable cardiovascular health  without symptoms concerning for angina or decompensated heart failure.   - Will change metoprolol tartrate 50 mg b.i.d. to metoprolol succinate 75 mg daily (decreasing overall daily dose due to concern for bradycardia).   - Zio patch.   - TTE.   - Advised that the patient should refrain from strenuous activity for now when he is on vacation.  However, light physical exercise as per his activity level at cardiac rehab is acceptable.  Also, I advised that while there are no clear recommendations for alcohol intake, I recommend that he just not drink excessively and can keep it to less than 4-5 alcoholic beverages a day, though the more alcohol he consumes, the higher the risk for recurrent issues, such as arrhythmia.   - Continue current cardiac regimen of aspirin 325 mg daily, rosuvastatin 20 mg daily, amlodipine 2.5 mg daily.   - Will need fasting lipids, goal LDL less than 70.   - Follow up in 6 months or sooner as needed.      Thank you for allowing our team to participate in the care of patient Mor Kerns.  Please do not hesitate to page or call with any questions or concerns.      Jean Méndez MD, [facility]  Cardiology  Text Page   2021        D: 2021   T: 2021   MT: cherrie      Name:     MOR KERNS   MRN:      [id number]        Account:      [id number]   :      1970           Service Date: 2021      Document: [id number]

## 2021-04-07 ENCOUNTER — HOSPITAL ENCOUNTER (OUTPATIENT)
Dept: CARDIOLOGY | Facility: CLINIC | Age: 51
End: 2021-04-07
Attending: INTERNAL MEDICINE
Payer: COMMERCIAL

## 2021-04-07 DIAGNOSIS — R00.1 BRADYCARDIA: ICD-10-CM

## 2021-04-07 DIAGNOSIS — R00.2 PALPITATIONS: ICD-10-CM

## 2021-04-07 DIAGNOSIS — I25.110 CORONARY ARTERY DISEASE INVOLVING NATIVE CORONARY ARTERY OF NATIVE HEART WITH UNSTABLE ANGINA PECTORIS (H): ICD-10-CM

## 2021-04-07 DIAGNOSIS — I77.810 MILD ASCENDING AORTA DILATION (H): ICD-10-CM

## 2021-04-07 PROCEDURE — 93321 DOPPLER ECHO F-UP/LMTD STD: CPT | Mod: 26 | Performed by: INTERNAL MEDICINE

## 2021-04-07 PROCEDURE — 93248 EXT ECG>7D<15D REV&INTERPJ: CPT | Performed by: INTERNAL MEDICINE

## 2021-04-07 PROCEDURE — 93308 TTE F-UP OR LMTD: CPT

## 2021-04-07 PROCEDURE — 93325 DOPPLER ECHO COLOR FLOW MAPG: CPT | Mod: 26 | Performed by: INTERNAL MEDICINE

## 2021-04-07 PROCEDURE — 93308 TTE F-UP OR LMTD: CPT | Mod: 26 | Performed by: INTERNAL MEDICINE

## 2021-04-07 PROCEDURE — 93246 EXT ECG>7D<15D RECORDING: CPT

## 2021-06-21 DIAGNOSIS — Z95.1 S/P CABG (CORONARY ARTERY BYPASS GRAFT): ICD-10-CM

## 2021-06-21 RX ORDER — ROSUVASTATIN CALCIUM 20 MG/1
20 TABLET, COATED ORAL DAILY
Qty: 90 TABLET | Refills: 1 | Status: SHIPPED | OUTPATIENT
Start: 2021-06-21 | End: 2021-09-24

## 2021-09-08 ENCOUNTER — TELEPHONE (OUTPATIENT)
Dept: CARDIOLOGY | Facility: CLINIC | Age: 51
End: 2021-09-08

## 2021-09-08 NOTE — TELEPHONE ENCOUNTER
"Call received from pt regarding concerns / questions regarding recent sxs / activity.  Pt's wife reports that he has been having some \"abnormal pain in his chest\" when making certain movements. Per wife she is concerned about the continues pain in his shoulder as well. She is concerned overall that he is not discussing his concerns with his provider and wants to ensure his concerns are being addressed at his upcoming office visit with the provider including:     - what his alcohol consumption should be - review moderation as discussed at last OV.   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   - review of healthy lifestyle choices and moderation with diet / exercise and what his goals should be in order to have a \"healthy long enjoyable life\".   - pt has voiced concerns about morality and \"not being around long\" when speaking to their children - possible depression component?    Wife advised this information would be communicated to the provider for review at upcoming OV. Reassurance provided regarding concerns.   JUAN FRANCISCO Kan RN, BSN.         "

## 2021-09-24 ENCOUNTER — OFFICE VISIT (OUTPATIENT)
Dept: CARDIOLOGY | Facility: CLINIC | Age: 51
End: 2021-09-24
Payer: COMMERCIAL

## 2021-09-24 ENCOUNTER — LAB (OUTPATIENT)
Dept: LAB | Facility: CLINIC | Age: 51
End: 2021-09-24
Payer: COMMERCIAL

## 2021-09-24 VITALS
WEIGHT: 257.8 LBS | DIASTOLIC BLOOD PRESSURE: 80 MMHG | BODY MASS INDEX: 34.92 KG/M2 | HEART RATE: 64 BPM | SYSTOLIC BLOOD PRESSURE: 140 MMHG | HEIGHT: 72 IN

## 2021-09-24 DIAGNOSIS — Z95.1 S/P CABG (CORONARY ARTERY BYPASS GRAFT): ICD-10-CM

## 2021-09-24 DIAGNOSIS — R00.2 PALPITATIONS: ICD-10-CM

## 2021-09-24 DIAGNOSIS — I83.90 ASYMPTOMATIC VARICOSE VEINS: Primary | ICD-10-CM

## 2021-09-24 DIAGNOSIS — I77.810 MILD ASCENDING AORTA DILATION (H): ICD-10-CM

## 2021-09-24 LAB
ALT SERPL W P-5'-P-CCNC: 39 U/L (ref 0–70)
CHOLEST SERPL-MCNC: 155 MG/DL
FASTING STATUS PATIENT QL REPORTED: YES
HDLC SERPL-MCNC: 44 MG/DL
LDLC SERPL CALC-MCNC: 90 MG/DL
NONHDLC SERPL-MCNC: 111 MG/DL
TRIGL SERPL-MCNC: 103 MG/DL

## 2021-09-24 PROCEDURE — 80061 LIPID PANEL: CPT

## 2021-09-24 PROCEDURE — 84460 ALANINE AMINO (ALT) (SGPT): CPT

## 2021-09-24 PROCEDURE — 99214 OFFICE O/P EST MOD 30 MIN: CPT | Performed by: PHYSICIAN ASSISTANT

## 2021-09-24 PROCEDURE — 36415 COLL VENOUS BLD VENIPUNCTURE: CPT

## 2021-09-24 RX ORDER — ROSUVASTATIN CALCIUM 20 MG/1
30 TABLET, COATED ORAL DAILY
Qty: 135 TABLET | Refills: 3 | Status: SHIPPED | OUTPATIENT
Start: 2021-09-24 | End: 2022-02-01

## 2021-09-24 ASSESSMENT — MIFFLIN-ST. JEOR: SCORE: 2057.37

## 2021-09-24 NOTE — LETTER
2021    Asad Goodwin  [address] [facility] [id number]    RE: Mor ROSAS Monson       Dear Colleague,    I had the pleasure of seeing Mor PILLAI Ermias in the [facility].        CARDIOLOGY CLINIC PROGRESS NOTE    DOS: 2021      Mor PILLAI Ermias  : 1970, 50 year old  MRN: [id number]      History:  50-year-old male with a past medical history significant for coronary artery disease, status post CABG (left radial artery graft to OM, LIMA to LAD 2021 with Dr. Rojas), mildly dilated aortic root and ascending aorta, who presents for followup. He is a patient of Dr. Méndez.      2021 he presented to [facility] with left chest discomfort as well as dyspnea on exertion.  In the emergency department, he was noted to be significantly hypertensive at 219/121 mmHg.  ECG demonstrated borderline LVH changes, but otherwise did not demonstrate any abnormalities, including acute ischemic abnormalities.  Troponins were normal.  TTE was done 2021 and demonstrated a mildly reduced LV function with anterior and anterolateral wall hypokinesis.  The patient underwent cardiac catheterization 2021 with Dr. Singh that demonstrated a severe ulcerative lesion in the distal left main extending into the ostial LAD and left circumflex as well as moderate disease in the RCA.  Ultimately, the patient was transferred to [facility] where he underwent coronary artery bypass surgery with Dr. Rojas 2021 with a left radial artery graft to the left circumflex OM and LIMA to LAD grafting.      He had some bradycardia and LH at cardiac rehab.     Dr. Méndez decreased his BB.  An echo was done 21 and showed normal structure and function. A Zio was worn from 21 to 21 and showed sinus rhythm. His min rate was 45 - sinus anita - during sleep at around 0530.  He had a few runs of SVT, longest was 10 beats.       He had FLP, ALT today.  FLP shows significant improvement in LDL from 159 to 90, but still not at goal of <70 with his CAD.  ALT is WNL.    He is doing well  No chest pain  He can have shoulder pain, this is related to lifting and overuse at his job.   LH is resolved except when bends and then stands straight up.   He checks his BP at home frequently, and also at pharmacies.  Normally 125-133/75-85.  Today it is 162/84. Repeat was 140/80.   He does have some posterior left leg varicose veins.  He has not worn compression, but will try.  He denies any pain associated at this time.          ROS:  Skin:  Positive for     Eyes:  Positive for redness  ENT:  Negative    Respiratory:  Negative    Cardiovascular:    Positive for;lightheadedness  Gastroenterology: Negative    Genitourinary:  Negative    Musculoskeletal:  Positive for arthritis  Neurologic:  Negative    Psychiatric:  Positive for anxiety  Heme/Lymph/Imm:  Negative    Endocrine:  Negative      PAST MEDICAL HISTORY:  No past medical history on file.    PAST SURGICAL HISTORY:  Past Surgical History:   Procedure Laterality Date     BYPASS GRAFT ARTERY CORONARY N/A 1/9/2021    Procedure: CORONARY ARTERY BYPASS GRAFT (CABG), On pump, Fermin, Radial vein graft LIMA-LAD Radial artery graft-OM;  Surgeon: Jacky Rojas MD;  Location:  OR     CV CORONARY ANGIOGRAM N/A 1/8/2021    Procedure: Coronary Angiogram;  Surgeon: Gabino Quevedo MD;  Location:  HEART CARDIAC CATH LAB     CV INTRA AORTIC BALLOON N/A 1/9/2021    Procedure: Intra-Aortic Balloon;  Surgeon: Mor Singh MD;  Location:  HEART CARDIAC CATH LAB     CV LEFT HEART CATH N/A 1/8/2021    Procedure: Left Heart Cath;  Surgeon: Gabino Quevedo MD;  Location:  HEART CARDIAC CATH LAB       SOCIAL HISTORY:  Social History     Socioeconomic History     Marital status:      Spouse name: None     Number of children: None     Years of education: None     Highest education  level: None   Occupational History     None   Tobacco Use     Smoking status: Never Smoker     Smokeless tobacco: Never Used   Substance and Sexual Activity     Alcohol use: Yes     Comment: on weekends     Drug use: None     Sexual activity: None   Other Topics Concern     None   Social History Narrative     None     Social Determinants of Health     Financial Resource Strain:      Difficulty of Paying Living Expenses:    Food Insecurity:      Worried About Running Out of Food in the Last Year:      Ran Out of Food in the Last Year:    Transportation Needs:      Lack of Transportation (Medical):      Lack of Transportation (Non-Medical):    Physical Activity:      Days of Exercise per Week:      Minutes of Exercise per Session:    Stress:      Feeling of Stress :    Social Connections:      Frequency of Communication with Friends and Family:      Frequency of Social Gatherings with Friends and Family:      Attends Ca[employer] Services:      Active Member of Clubs or Organizations:      Attends Club or Organization Meetings:      Marital Status:    Intimate Partner Violence:      Fear of Current or Ex-Partner:      Emotionally Abused:      Physically Abused:      Sexually Abused:        FAMILY HISTORY:  History reviewed. No pertinent family history.    MEDS: amLODIPine (NORVASC) 2.5 MG tablet, Take 1 tablet (2.5 mg) by mouth daily  aspirin (ASA) 325 MG EC tablet, Take 1 tablet (325 mg) by mouth daily  ciprofloxacin-dexamethasone (CIPRODEX) 0.3-0.1 % otic suspension, Place 4 drops into both ears 2 times daily as needed  doxycycline hyclate (VIBRA-TABS) 100 MG tablet, Take 100 mg by mouth 2 times daily as needed (eye infection)  metoprolol succinate ER (TOPROL-XL) 50 MG 24 hr tablet, Take 1.5 tablets (75 mg) by mouth daily  Multiple Vitamins-Minerals (MULTIVITAMIN ADULTS PO), Take by mouth daily    No current facility-administered medications on file prior to visit.      ALLERGIES: No Known Allergies    PHYSICAL  EXAM:  Vitals: BP (!) 140/80 (BP Location: Right arm, Patient Position: Sitting, Cuff Size: Adult Large)   Pulse 64   Ht 1.829 m (6')   Wt 116.9 kg (257 lb 12.8 oz)   BMI 34.96 kg/m    Constitutional:  cooperative, alert and oriented, well developed, well nourished, in no acute distress        Skin:  warm and dry to the touch, no apparent skin lesions or masses noted        Head:  normocephalic        Eyes:  pupils equal and round;conjunctivae and lids unremarkable;sclera white        ENT:  no pallor or cyanosis        Neck:  JVP normal        Respiratory:  clear to auscultation        Cardiac: regular rhythm, normal S1/S2, no S3 or S4, apical impulse not displaced, no murmurs, gallops or rubs                  GI:  abdomen soft;BS normoactive        Vascular:                                        Extremities and Musculoskeletal:  no deformities, clubbing, cyanosis, erythema observed;no edema;normal muscle strength and tone        Neurological:  no gross motor deficits;affect appropriate            LABS/DATA:  I reviewed the following:  Echo 4/7/21:  Interpretation Summary  The visual ejection fraction is estimated at 55-60%.  Regional wall motion abnormalities cannot be excluded due to limited  visualization.  Subtle regional wall motion abnormalities could be missed on this study.  Contrast would enhance regional wall motion analysis as it did on the previous  study.  There is trace aortic regurgitation.  The study was technically difficult.      Zio 4/7/21-4/20/21:  Sinus, min 45, average 65, max 160.   7 SVT, longest 10 beats.   Rare PACs, PVCs.       Component      Latest Ref Rng & Units 9/24/2021   Cholesterol      <200 mg/dL 155   Triglycerides      <150 mg/dL 103   HDL Cholesterol      >=40 mg/dL 44   LDL Cholesterol Calculated      <=100 mg/dL 90   Non HDL Cholesterol      <130 mg/dL 111   Patient Fasting > 8hrs?       Yes   ALT      0 - 70 U/L 39         ASSESSMENT/PLAN:  1.  Coronary artery disease,  presented with unstable angina 01/07/2021, status post cardiac catheterization 01/08/2021 demonstrating multivessel obstructive coronary artery disease, status post coronary artery bypass surgery with Dr. Rojas 01/09/2021.  Left radial artery graft to left circumflex and OM, LIMA to LAD.  Residual nonobstructive disease in the RCA.    Echo 4/7/21: normal LVEF  Denies any symptoms concerning for angina or decompensated heart failure.   Continue ASA, BB, statin.  LDL is above goal at 90, so will increase to 30 mg.  Repeat FLP in follow up.     2.  Postoperative SVT versus atrial fibrillation, postop day 2, per chart review resolved with metoprolol, no evidence of recurrence since then.   Zio with short SVT runs, no afib.   Continue BB.     3.  Bradycardia, ED visit from rehab 02/18/2021 with dizziness at cardiac rehab, bradycardia with rates in the 30s beats-per-minute range, thought to be due to vasovagal event.    Metoprolol 50 mg BID changed to Toprol XL 75 mg.  Zio showed min HR 45 during sleep (sinus anita), and his sxs have resolved.       4.  Hypertension.   Controlled at home, borderline here  Continue current regimen      5.  Hyperlipidemia.     on 01/08/2021.   LDL 90 9/24/21 on Crestor 20 mg.    LDL goal <70 given his CAD.  Increase Crestor to 30 mg.  FLP in follow up.         Follow up:  6 months with FLP/ALT      Daljit Rodas PA-C      Thank you for allowing me to participate in the care of your patient.      Sincerely,     Daljit Rodas PA-C     [facility]  cc:   Jean Méndez MD  [address], [place] [id number]  [place] [address]

## 2021-09-24 NOTE — PROGRESS NOTES
CARDIOLOGY CLINIC PROGRESS NOTE    DOS: 2021      Mor Monson  : 1970, 50 year old  MRN: [id number]      History:  50-year-old male with a past medical history significant for coronary artery disease, status post CABG (left radial artery graft to OM, LIMA to LAD 2021 with Dr. Rojas), mildly dilated aortic root and ascending aorta, who presents for followup. He is a patient of Dr. Méndez.      2021 he presented to [facility] with left chest discomfort as well as dyspnea on exertion.  In the emergency department, he was noted to be significantly hypertensive at 219/121 mmHg.  ECG demonstrated borderline LVH changes, but otherwise did not demonstrate any abnormalities, including acute ischemic abnormalities.  Troponins were normal.  TTE was done 2021 and demonstrated a mildly reduced LV function with anterior and anterolateral wall hypokinesis.  The patient underwent cardiac catheterization 2021 with Dr. Singh that demonstrated a severe ulcerative lesion in the distal left main extending into the ostial LAD and left circumflex as well as moderate disease in the RCA.  Ultimately, the patient was transferred to [facility] where he underwent coronary artery bypass surgery with Dr. Rojas 2021 with a left radial artery graft to the left circumflex OM and LIMA to LAD grafting.      He had some bradycardia and LH at cardiac rehab.     Dr. Méndez decreased his BB.  An echo was done 21 and showed normal structure and function. A Zio was worn from 21 to 21 and showed sinus rhythm. His min rate was 45 - sinus anita - during sleep at around 0530.  He had a few runs of SVT, longest was 10 beats.      He had FLP, ALT today.  FLP shows significant improvement in LDL from 159 to 90, but still not at goal of <70 with his CAD.  ALT is WNL.    He is doing well  No chest pain  He can have shoulder pain, this is related to lifting and overuse  at his job.   LH is resolved except when bends and then stands straight up.   He checks his BP at home frequently, and also at pharmacies.  Normally 125-133/75-85.  Today it is 162/84. Repeat was 140/80.   He does have some posterior left leg varicose veins.  He has not worn compression, but will try.  He denies any pain associated at this time.          ROS:  Skin:  Positive for     Eyes:  Positive for redness  ENT:  Negative    Respiratory:  Negative    Cardiovascular:    Positive for;lightheadedness  Gastroenterology: Negative    Genitourinary:  Negative    Musculoskeletal:  Positive for arthritis  Neurologic:  Negative    Psychiatric:  Positive for anxiety  Heme/Lymph/Imm:  Negative    Endocrine:  Negative      PAST MEDICAL HISTORY:  No past medical history on file.    PAST SURGICAL HISTORY:  Past Surgical History:   Procedure Laterality Date     BYPASS GRAFT ARTERY CORONARY N/A 1/9/2021    Procedure: CORONARY ARTERY BYPASS GRAFT (CABG), On pump, Fermin, Radial vein graft LIMA-LAD Radial artery graft-OM;  Surgeon: Jacky Rojas MD;  Location:  OR     CV CORONARY ANGIOGRAM N/A 1/8/2021    Procedure: Coronary Angiogram;  Surgeon: Gabino Quevedo MD;  Location:  HEART CARDIAC CATH LAB     CV INTRA AORTIC BALLOON N/A 1/9/2021    Procedure: Intra-Aortic Balloon;  Surgeon: Mor Singh MD;  Location:  HEART CARDIAC CATH LAB     CV LEFT HEART CATH N/A 1/8/2021    Procedure: Left Heart Cath;  Surgeon: Gabino Quevedo MD;  Location:  HEART CARDIAC CATH LAB       SOCIAL HISTORY:  Social History     Socioeconomic History     Marital status:      Spouse name: None     Number of children: None     Years of education: None     Highest education level: None   Occupational History     None   Tobacco Use     Smoking status: Never Smoker     Smokeless tobacco: Never Used   Substance and Sexual Activity     Alcohol use: Yes     Comment: on weekends     Drug use: None     Sexual activity:  None   Other Topics Concern     None   Social History Narrative     None     Social Determinants of Health     Financial Resource Strain:      Difficulty of Paying Living Expenses:    Food Insecurity:      Worried About Running Out of Food in the Last Year:      Ran Out of Food in the Last Year:    Transportation Needs:      Lack of Transportation (Medical):      Lack of Transportation (Non-Medical):    Physical Activity:      Days of Exercise per Week:      Minutes of Exercise per Session:    Stress:      Feeling of Stress :    Social Connections:      Frequency of Communication with Friends and Family:      Frequency of Social Gatherings with Friends and Family:      Attends Spiritism Services:      Active Member of Clubs or Organizations:      Attends Club or Organization Meetings:      Marital Status:    Intimate Partner Violence:      Fear of Current or Ex-Partner:      Emotionally Abused:      Physically Abused:      Sexually Abused:        FAMILY HISTORY:  History reviewed. No pertinent family history.    MEDS: amLODIPine (NORVASC) 2.5 MG tablet, Take 1 tablet (2.5 mg) by mouth daily  aspirin (ASA) 325 MG EC tablet, Take 1 tablet (325 mg) by mouth daily  ciprofloxacin-dexamethasone (CIPRODEX) 0.3-0.1 % otic suspension, Place 4 drops into both ears 2 times daily as needed  doxycycline hyclate (VIBRA-TABS) 100 MG tablet, Take 100 mg by mouth 2 times daily as needed (eye infection)  metoprolol succinate ER (TOPROL-XL) 50 MG 24 hr tablet, Take 1.5 tablets (75 mg) by mouth daily  Multiple Vitamins-Minerals (MULTIVITAMIN ADULTS PO), Take by mouth daily    No current facility-administered medications on file prior to visit.      ALLERGIES: No Known Allergies    PHYSICAL EXAM:  Vitals: BP (!) 140/80 (BP Location: Right arm, Patient Position: Sitting, Cuff Size: Adult Large)   Pulse 64   Ht 1.829 m (6')   Wt 116.9 kg (257 lb 12.8 oz)   BMI 34.96 kg/m    Constitutional:  cooperative, alert and oriented, well  developed, well nourished, in no acute distress        Skin:  warm and dry to the touch, no apparent skin lesions or masses noted        Head:  normocephalic        Eyes:  pupils equal and round;conjunctivae and lids unremarkable;sclera white        ENT:  no pallor or cyanosis        Neck:  JVP normal        Respiratory:  clear to auscultation        Cardiac: regular rhythm, normal S1/S2, no S3 or S4, apical impulse not displaced, no murmurs, gallops or rubs                  GI:  abdomen soft;BS normoactive        Vascular:                                        Extremities and Musculoskeletal:  no deformities, clubbing, cyanosis, erythema observed;no edema;normal muscle strength and tone        Neurological:  no gross motor deficits;affect appropriate            LABS/DATA:  I reviewed the following:  Echo 4/7/21:  Interpretation Summary  The visual ejection fraction is estimated at 55-60%.  Regional wall motion abnormalities cannot be excluded due to limited  visualization.  Subtle regional wall motion abnormalities could be missed on this study.  Contrast would enhance regional wall motion analysis as it did on the previous  study.  There is trace aortic regurgitation.  The study was technically difficult.      Zio 4/7/21-4/20/21:  Sinus, min 45, average 65, max 160.   7 SVT, longest 10 beats.   Rare PACs, PVCs.       Component      Latest Ref Rng & Units 9/24/2021   Cholesterol      <200 mg/dL 155   Triglycerides      <150 mg/dL 103   HDL Cholesterol      >=40 mg/dL 44   LDL Cholesterol Calculated      <=100 mg/dL 90   Non HDL Cholesterol      <130 mg/dL 111   Patient Fasting > 8hrs?       Yes   ALT      0 - 70 U/L 39         ASSESSMENT/PLAN:  1.  Coronary artery disease, presented with unstable angina 01/07/2021, status post cardiac catheterization 01/08/2021 demonstrating multivessel obstructive coronary artery disease, status post coronary artery bypass surgery with Dr. Rojas 01/09/2021.  Left radial  artery graft to left circumflex and OM, LIMA to LAD.  Residual nonobstructive disease in the RCA.    Echo 4/7/21: normal LVEF  Denies any symptoms concerning for angina or decompensated heart failure.   Continue ASA, BB, statin.  LDL is above goal at 90, so will increase to 30 mg.  Repeat FLP in follow up.     2.  Postoperative SVT versus atrial fibrillation, postop day 2, per chart review resolved with metoprolol, no evidence of recurrence since then.   Zio with short SVT runs, no afib.   Continue BB.     3.  Bradycardia, ED visit from rehab 02/18/2021 with dizziness at cardiac rehab, bradycardia with rates in the 30s beats-per-minute range, thought to be due to vasovagal event.    Metoprolol 50 mg BID changed to Toprol XL 75 mg.  Zio showed min HR 45 during sleep (sinus anita), and his sxs have resolved.       4.  Hypertension.   Controlled at home, borderline here  Continue current regimen      5.  Hyperlipidemia.     on 01/08/2021.   LDL 90 9/24/21 on Crestor 20 mg.    LDL goal <70 given his CAD.  Increase Crestor to 30 mg.  FLP in follow up.         Follow up:  6 months with FLP/ALT      Daljit Rodas PA-C

## 2021-10-24 ENCOUNTER — HEALTH MAINTENANCE LETTER (OUTPATIENT)
Age: 51
End: 2021-10-24

## 2022-02-01 ENCOUNTER — CARE COORDINATION (OUTPATIENT)
Dept: CARDIOLOGY | Facility: CLINIC | Age: 52
End: 2022-02-01

## 2022-02-01 ENCOUNTER — OFFICE VISIT (OUTPATIENT)
Dept: CARDIOLOGY | Facility: CLINIC | Age: 52
End: 2022-02-01
Payer: COMMERCIAL

## 2022-02-01 ENCOUNTER — LAB (OUTPATIENT)
Dept: LAB | Facility: CLINIC | Age: 52
End: 2022-02-01
Payer: COMMERCIAL

## 2022-02-01 VITALS
SYSTOLIC BLOOD PRESSURE: 134 MMHG | OXYGEN SATURATION: 99 % | WEIGHT: 268.5 LBS | HEIGHT: 72 IN | HEART RATE: 57 BPM | DIASTOLIC BLOOD PRESSURE: 90 MMHG | BODY MASS INDEX: 36.37 KG/M2

## 2022-02-01 DIAGNOSIS — Z95.1 S/P CABG (CORONARY ARTERY BYPASS GRAFT): ICD-10-CM

## 2022-02-01 LAB
ALT SERPL W P-5'-P-CCNC: 54 U/L (ref 0–70)
CHOLEST SERPL-MCNC: 155 MG/DL
FASTING STATUS PATIENT QL REPORTED: YES
HDLC SERPL-MCNC: 40 MG/DL
LDLC SERPL CALC-MCNC: 84 MG/DL
NONHDLC SERPL-MCNC: 115 MG/DL
TRIGL SERPL-MCNC: 154 MG/DL

## 2022-02-01 PROCEDURE — 36415 COLL VENOUS BLD VENIPUNCTURE: CPT

## 2022-02-01 PROCEDURE — 84460 ALANINE AMINO (ALT) (SGPT): CPT

## 2022-02-01 PROCEDURE — 99214 OFFICE O/P EST MOD 30 MIN: CPT | Performed by: PHYSICIAN ASSISTANT

## 2022-02-01 PROCEDURE — 80061 LIPID PANEL: CPT

## 2022-02-01 RX ORDER — ROSUVASTATIN CALCIUM 40 MG/1
40 TABLET, COATED ORAL DAILY
Qty: 90 TABLET | Refills: 1 | Status: SHIPPED | OUTPATIENT
Start: 2022-02-01 | End: 2022-08-30

## 2022-02-01 RX ORDER — AMLODIPINE BESYLATE 2.5 MG/1
5 TABLET ORAL DAILY
Qty: 180 TABLET | Refills: 3 | Status: SHIPPED | OUTPATIENT
Start: 2022-02-01 | End: 2022-11-07

## 2022-02-01 ASSESSMENT — MIFFLIN-ST. JEOR: SCORE: 2100.91

## 2022-02-01 NOTE — LETTER
2022    Asad Goodwin  [id number] [facility] [id number]    RE: Mor PILLAI Ermias       Dear Colleague,     I had the pleasure of seeing Mor Monson in the [facility].      CARDIOLOGY CLINIC PROGRESS NOTE    DOS: 2022      Mor Monson  : 1970, 51 year old  MRN: [id number]      History:  51-year-old male with a past medical history significant for coronary artery disease, status post CABG (left radial artery graft to OM, LIMA to LAD 2021 with Dr. Rojas), mildly dilated aortic root and ascending aorta, who presents for followup. He is a patient of Dr. Méndez.      2021 he presented to [facility] with left chest discomfort as well as dyspnea on exertion.  In the emergency department, he was noted to be significantly hypertensive at 219/121 mmHg.  ECG demonstrated borderline LVH changes, but otherwise did not demonstrate any abnormalities, including acute ischemic abnormalities.  Troponins were normal.  TTE was done 2021 and demonstrated a mildly reduced LV function with anterior and anterolateral wall hypokinesis.  The patient underwent cardiac catheterization 2021 with Dr. Singh that demonstrated a severe ulcerative lesion in the distal left main extending into the ostial LAD and left circumflex as well as moderate disease in the RCA.  Ultimately, the patient was transferred to [facility] where he underwent coronary artery bypass surgery with Dr. Rojas 2021 with a left radial artery graft to the left circumflex OM and LIMA to LAD grafting.      He had some bradycardia and LH at cardiac rehab.     Dr. Méndez decreased his BB.  An echo was done 21 and showed normal structure and function. A Zio was worn from 21 to 21 and showed sinus rhythm. His min rate was 45 - sinus ainta - during sleep at around 0530.  He had a few runs of SVT, longest was 10 beats.      FLP 21 showed significant  improvement in LDL from 159 to 90, but still not at goal of <70 with his CAD.  We increased Crestor to 30 mg.   He is doing well.  No chest pain.  He can occasionally have some sternal incision tingling after lifting.   LH is resolved except when bends and then stands straight up. He has had less recently.   He checks his BP at home frequently, and also at pharmacies.  Normally 125/85 at home.    He has been out walking less this past month due to icy roads.   He does have some posterior left leg varicose veins.  He has been wearing compression stockings and no difference.  He is scheduled for LE venous comp testing this week.   FLP today is in process.          ROS:  Skin:  Positive for     Eyes:  Negative    ENT:  Negative    Respiratory:  Negative    Cardiovascular:    Positive for;lightheadedness  Gastroenterology: Negative    Genitourinary:  Negative    Musculoskeletal:  Positive for arthritis  Neurologic:  Negative    Psychiatric:  Negative    Heme/Lymph/Imm:  Negative    Endocrine:  Negative      PAST MEDICAL HISTORY:  No past medical history on file.    PAST SURGICAL HISTORY:  Past Surgical History:   Procedure Laterality Date     BYPASS GRAFT ARTERY CORONARY N/A 1/9/2021    Procedure: CORONARY ARTERY BYPASS GRAFT (CABG), On pump, Fermin, Radial vein graft LIMA-LAD Radial artery graft-OM;  Surgeon: Jacky Rojas MD;  Location:  OR     CV CORONARY ANGIOGRAM N/A 1/8/2021    Procedure: Coronary Angiogram;  Surgeon: Gabino Quevedo MD;  Location:  HEART CARDIAC CATH LAB     CV INTRA AORTIC BALLOON N/A 1/9/2021    Procedure: Intra-Aortic Balloon;  Surgeon: Mor Singh MD;  Location:  HEART CARDIAC CATH LAB     CV LEFT HEART CATH N/A 1/8/2021    Procedure: Left Heart Cath;  Surgeon: Gabino Quevedo MD;  Location:  HEART CARDIAC CATH LAB       SOCIAL HISTORY:  Social History     Socioeconomic History     Marital status:      Spouse name: None     Number of children: None      Years of education: None     Highest education level: None   Occupational History     None   Tobacco Use     Smoking status: Never Smoker     Smokeless tobacco: Never Used   Substance and Sexual Activity     Alcohol use: Yes     Comment: on weekends     Drug use: None     Sexual activity: None   Other Topics Concern     None   Social History Narrative     None     Social Determinants of Health     Financial Resource Strain:      Difficulty of Paying Living Expenses:    Food Insecurity:      Worried About Running Out of Food in the Last Year:      Ran Out of Food in the Last Year:    Transportation Needs:      Lack of Transportation (Medical):      Lack of Transportation (Non-Medical):    Physical Activity:      Days of Exercise per Week:      Minutes of Exercise per Session:    Stress:      Feeling of Stress :    Social Connections:      Frequency of Communication with Friends and Family:      Frequency of Social Gatherings with Friends and Family:      Attends Zoroastrian Services:      Active Member of Clubs or Organizations:      Attends Club or Organization Meetings:      Marital Status:    Intimate Partner Violence:      Fear of Current or Ex-Partner:      Emotionally Abused:      Physically Abused:      Sexually Abused:        FAMILY HISTORY:  No family history on file.    MEDS: aspirin (ASA) 325 MG EC tablet, Take 1 tablet (325 mg) by mouth daily  ciprofloxacin-dexamethasone (CIPRODEX) 0.3-0.1 % otic suspension, Place 4 drops into both ears 2 times daily as needed  doxycycline hyclate (VIBRA-TABS) 100 MG tablet, Take 100 mg by mouth 2 times daily as needed (eye infection)  HEMP OIL OR EXTRACT OR OTHER CBD CANNABINOID, NOT MEDICAL CANNABIS,, At Bedtime  metoprolol succinate ER (TOPROL-XL) 50 MG 24 hr tablet, Take 1.5 tablets (75 mg) by mouth daily  Multiple Vitamins-Minerals (MULTIVITAMIN ADULTS PO), Take by mouth daily  rosuvastatin (CRESTOR) 20 MG tablet, Take 1.5 tablets (30 mg) by mouth daily    No current  facility-administered medications on file prior to visit.      ALLERGIES: No Known Allergies    PHYSICAL EXAM:  Vitals: BP (!) 134/90 (BP Location: Right arm, Patient Position: Sitting, Cuff Size: Adult Large)   Pulse 57   Ht 1.829 m (6')   Wt 121.8 kg (268 lb 8 oz)   SpO2 99%   BMI 36.42 kg/m    Constitutional:  cooperative, alert and oriented, well developed, well nourished, in no acute distress        Skin:  warm and dry to the touch, no apparent skin lesions or masses noted        Head:  normocephalic        Eyes:  pupils equal and round;conjunctivae and lids unremarkable;sclera white        ENT:  no pallor or cyanosis        Neck:  JVP normal        Respiratory:  clear to auscultation        Cardiac: regular rhythm, normal S1/S2, no S3 or S4, apical impulse not displaced, no murmurs, gallops or rubs                  GI:  abdomen soft;BS normoactive        Vascular:                                        Extremities and Musculoskeletal:  no deformities, clubbing, cyanosis, erythema observed;no edema;normal muscle strength and tone        Neurological:  no gross motor deficits;affect appropriate            LABS/DATA:  I reviewed the following:  Echo 4/7/21:  Interpretation Summary  The visual ejection fraction is estimated at 55-60%.  Regional wall motion abnormalities cannot be excluded due to limited  visualization.  Subtle regional wall motion abnormalities could be missed on this study.  Contrast would enhance regional wall motion analysis as it did on the previous  study.  There is trace aortic regurgitation.  The study was technically difficult.      Zio 4/7/21-4/20/21:  Sinus, min 45, average 65, max 160.   7 SVT, longest 10 beats.   Rare PACs, PVCs.             ASSESSMENT/PLAN:  1.  Coronary artery disease, presented with unstable angina 01/07/2021, status post cardiac catheterization 01/08/2021 demonstrating multivessel obstructive coronary artery disease, status post coronary artery bypass surgery  with Dr. Rojas 01/09/2021.  Left radial artery graft to left circumflex and OM, LIMA to LAD.  Residual nonobstructive disease in the RCA.    Echo 4/7/21: normal LVEF  Denies any symptoms concerning for angina or decompensated heart failure.   Continue ASA, BB, statin. Await FLP today.  If LDL is still >70, increase Crestor to 40 mg.  Will plan to repeat FLP in 6 months.       2.  Postoperative SVT versus atrial fibrillation, postop day 2, per chart review resolved with metoprolol, no evidence of recurrence since then.   Zio with short SVT runs, no afib.   Continue BB.       3.  Bradycardia, ED visit from rehab 02/18/2021 with dizziness at cardiac rehab, bradycardia with rates in the 30s beats-per-minute range, thought to be due to vasovagal event.    Metoprolol 50 mg BID changed to Toprol XL 75 mg.  Zio showed min HR 45 during sleep (sinus anita), and his sxs have resolved.       4.  Hypertension.   Mildly elevated both at home and here.   Continue Toprol XL 75 mg, increase amlodipine to 5 mg.       5.  Hyperlipidemia.     on 01/08/2021.   LDL 90 9/24/21 on Crestor 20 mg.    Await FLP today on Crestor 30 mg.  LDL goal <70 given his CAD. If LDL still >70, will increase Crestor to 40 mg         Follow up:  6 months with FLP and  Follow up on BP      Daljit Rodas PA-C      Thank you for allowing me to participate in the care of your patient.      Sincerely,     Daljit Rodas PA-C     [facility]

## 2022-02-01 NOTE — PROGRESS NOTES
CARDIOLOGY CLINIC PROGRESS NOTE    DOS: 2022      Mor Monson  : 1970, 51 year old  MRN: [id number]      History:  51-year-old male with a past medical history significant for coronary artery disease, status post CABG (left radial artery graft to OM, LIMA to LAD 2021 with Dr. Rojas), mildly dilated aortic root and ascending aorta, who presents for followup. He is a patient of Dr. Méndez.      2021 he presented to [facility] with left chest discomfort as well as dyspnea on exertion.  In the emergency department, he was noted to be significantly hypertensive at 219/121 mmHg.  ECG demonstrated borderline LVH changes, but otherwise did not demonstrate any abnormalities, including acute ischemic abnormalities.  Troponins were normal.  TTE was done 2021 and demonstrated a mildly reduced LV function with anterior and anterolateral wall hypokinesis.  The patient underwent cardiac catheterization 2021 with Dr. Singh that demonstrated a severe ulcerative lesion in the distal left main extending into the ostial LAD and left circumflex as well as moderate disease in the RCA.  Ultimately, the patient was transferred to [facility] where he underwent coronary artery bypass surgery with Dr. Rojas 2021 with a left radial artery graft to the left circumflex OM and LIMA to LAD grafting.      He had some bradycardia and LH at cardiac rehab.     Dr. Méndez decreased his BB.  An echo was done 21 and showed normal structure and function. A Zio was worn from 21 to 21 and showed sinus rhythm. His min rate was 45 - sinus anita - during sleep at around 0530.  He had a few runs of SVT, longest was 10 beats.      FLP 21 showed significant improvement in LDL from 159 to 90, but still not at goal of <70 with his CAD.  We increased Crestor to 30 mg.   He is doing well.  No chest pain.  He can occasionally have some sternal incision tingling after  lifting.   LH is resolved except when bends and then stands straight up. He has had less recently.   He checks his BP at home frequently, and also at pharmacies.  Normally 125/85 at home.    He has been out walking less this past month due to icy roads.   He does have some posterior left leg varicose veins.  He has been wearing compression stockings and no difference.  He is scheduled for LE venous comp testing this week.   FLP today is in process.          ROS:  Skin:  Positive for     Eyes:  Negative    ENT:  Negative    Respiratory:  Negative    Cardiovascular:    Positive for;lightheadedness  Gastroenterology: Negative    Genitourinary:  Negative    Musculoskeletal:  Positive for arthritis  Neurologic:  Negative    Psychiatric:  Negative    Heme/Lymph/Imm:  Negative    Endocrine:  Negative      PAST MEDICAL HISTORY:  No past medical history on file.    PAST SURGICAL HISTORY:  Past Surgical History:   Procedure Laterality Date     BYPASS GRAFT ARTERY CORONARY N/A 1/9/2021    Procedure: CORONARY ARTERY BYPASS GRAFT (CABG), On pump, Fermin, Radial vein graft LIMA-LAD Radial artery graft-OM;  Surgeon: Jacky Rojas MD;  Location:  OR     CV CORONARY ANGIOGRAM N/A 1/8/2021    Procedure: Coronary Angiogram;  Surgeon: Gabino Quevedo MD;  Location:  HEART CARDIAC CATH LAB     CV INTRA AORTIC BALLOON N/A 1/9/2021    Procedure: Intra-Aortic Balloon;  Surgeon: Mor Singh MD;  Location:  HEART CARDIAC CATH LAB     CV LEFT HEART CATH N/A 1/8/2021    Procedure: Left Heart Cath;  Surgeon: Gabino Quevedo MD;  Location:  HEART CARDIAC CATH LAB       SOCIAL HISTORY:  Social History     Socioeconomic History     Marital status:      Spouse name: None     Number of children: None     Years of education: None     Highest education level: None   Occupational History     None   Tobacco Use     Smoking status: Never Smoker     Smokeless tobacco: Never Used   Substance and Sexual Activity      Alcohol use: Yes     Comment: on weekends     Drug use: None     Sexual activity: None   Other Topics Concern     None   Social History Narrative     None     Social Determinants of Health     Financial Resource Strain:      Difficulty of Paying Living Expenses:    Food Insecurity:      Worried About Running Out of Food in the Last Year:      Ran Out of Food in the Last Year:    Transportation Needs:      Lack of Transportation (Medical):      Lack of Transportation (Non-Medical):    Physical Activity:      Days of Exercise per Week:      Minutes of Exercise per Session:    Stress:      Feeling of Stress :    Social Connections:      Frequency of Communication with Friends and Family:      Frequency of Social Gatherings with Friends and Family:      Attends [employer] Services:      Active Member of Clubs or Organizations:      Attends Club or Organization Meetings:      Marital Status:    Intimate Partner Violence:      Fear of Current or Ex-Partner:      Emotionally Abused:      Physically Abused:      Sexually Abused:        FAMILY HISTORY:  No family history on file.    MEDS: aspirin (ASA) 325 MG EC tablet, Take 1 tablet (325 mg) by mouth daily  ciprofloxacin-dexamethasone (CIPRODEX) 0.3-0.1 % otic suspension, Place 4 drops into both ears 2 times daily as needed  doxycycline hyclate (VIBRA-TABS) 100 MG tablet, Take 100 mg by mouth 2 times daily as needed (eye infection)  HEMP OIL OR EXTRACT OR OTHER CBD CANNABINOID, NOT MEDICAL CANNABIS,, At Bedtime  metoprolol succinate ER (TOPROL-XL) 50 MG 24 hr tablet, Take 1.5 tablets (75 mg) by mouth daily  Multiple Vitamins-Minerals (MULTIVITAMIN ADULTS PO), Take by mouth daily  rosuvastatin (CRESTOR) 20 MG tablet, Take 1.5 tablets (30 mg) by mouth daily    No current facility-administered medications on file prior to visit.      ALLERGIES: No Known Allergies    PHYSICAL EXAM:  Vitals: BP (!) 134/90 (BP Location: Right arm, Patient Position: Sitting, Cuff Size: Adult Large)    Pulse 57   Ht 1.829 m (6')   Wt 121.8 kg (268 lb 8 oz)   SpO2 99%   BMI 36.42 kg/m    Constitutional:  cooperative, alert and oriented, well developed, well nourished, in no acute distress        Skin:  warm and dry to the touch, no apparent skin lesions or masses noted        Head:  normocephalic        Eyes:  pupils equal and round;conjunctivae and lids unremarkable;sclera white        ENT:  no pallor or cyanosis        Neck:  JVP normal        Respiratory:  clear to auscultation        Cardiac: regular rhythm, normal S1/S2, no S3 or S4, apical impulse not displaced, no murmurs, gallops or rubs                  GI:  abdomen soft;BS normoactive        Vascular:                                        Extremities and Musculoskeletal:  no deformities, clubbing, cyanosis, erythema observed;no edema;normal muscle strength and tone        Neurological:  no gross motor deficits;affect appropriate            LABS/DATA:  I reviewed the following:  Echo 4/7/21:  Interpretation Summary  The visual ejection fraction is estimated at 55-60%.  Regional wall motion abnormalities cannot be excluded due to limited  visualization.  Subtle regional wall motion abnormalities could be missed on this study.  Contrast would enhance regional wall motion analysis as it did on the previous  study.  There is trace aortic regurgitation.  The study was technically difficult.      Zio 4/7/21-4/20/21:  Sinus, min 45, average 65, max 160.   7 SVT, longest 10 beats.   Rare PACs, PVCs.             ASSESSMENT/PLAN:  1.  Coronary artery disease, presented with unstable angina 01/07/2021, status post cardiac catheterization 01/08/2021 demonstrating multivessel obstructive coronary artery disease, status post coronary artery bypass surgery with Dr. Rojas 01/09/2021.  Left radial artery graft to left circumflex and OM, LIMA to LAD.  Residual nonobstructive disease in the RCA.    Echo 4/7/21: normal LVEF  Denies any symptoms concerning for  angina or decompensated heart failure.   Continue ASA, BB, statin. Await FLP today.  If LDL is still >70, increase Crestor to 40 mg.  Will plan to repeat FLP in 6 months.       2.  Postoperative SVT versus atrial fibrillation, postop day 2, per chart review resolved with metoprolol, no evidence of recurrence since then.   Zio with short SVT runs, no afib.   Continue BB.       3.  Bradycardia, ED visit from rehab 02/18/2021 with dizziness at cardiac rehab, bradycardia with rates in the 30s beats-per-minute range, thought to be due to vasovagal event.    Metoprolol 50 mg BID changed to Toprol XL 75 mg.  Zio showed min HR 45 during sleep (sinus anita), and his sxs have resolved.       4.  Hypertension.   Mildly elevated both at home and here.   Continue Toprol XL 75 mg, increase amlodipine to 5 mg.       5.  Hyperlipidemia.     on 01/08/2021.   LDL 90 9/24/21 on Crestor 20 mg.    Await FLP today on Crestor 30 mg.  LDL goal <70 given his CAD. If LDL still >70, will increase Crestor to 40 mg         Follow up:  6 months with FLP and  Follow up on BP      Daljit Rodas PA-C

## 2022-02-01 NOTE — PROGRESS NOTES
"Component      Latest Ref Rng & Units 2/1/2022   Cholesterol      <200 mg/dL 155   Triglycerides      <150 mg/dL 154 (H)   HDL Cholesterol      >=40 mg/dL 40   LDL Cholesterol Calculated      <=100 mg/dL 84   Non HDL Cholesterol      <130 mg/dL 115   Patient Fasting > 8hrs?       Yes   ALT      0 - 70 U/L 54       Lara Irizarry RN   2/1/2022  2:22 PM CST Back to Top        Visit today with ADAM Canela:      \"1.  Coronary artery disease, presented with unstable angina 01/07/2021, status post cardiac catheterization 01/08/2021 demonstrating multivessel obstructive coronary artery disease, status post coronary artery bypass surgery with Dr. Rojas 01/09/2021.  Left radial artery graft to left circumflex and OM, LIMA to LAD.  Residual nonobstructive disease in the RCA.    Echo 4/7/21: normal LVEF  Denies any symptoms concerning for angina or decompensated heart failure.   Continue ASA, BB, statin. Await FLP today.  If LDL is still >70, increase Crestor to 40 mg.  Will plan to repeat FLP in 6 months. \"     Routed results to pt via [employer] with instructions per ADAM Canela to increase Crestor to 40mg daily, given LDL above goal. Med list updated. New Rx sent.        "

## 2022-02-01 NOTE — PATIENT INSTRUCTIONS
Your blood pressure is running a little high lately. Increase amlodipine to 5 mg (2 x 2.5 mg) daily.   If you notice that your blood pressure is starting to run lower, you can go back to amlodipine 2.5 mg once daily.     We will await your cholesterol numbers to result.  If the LDL is above 70, we will increase the Crestor to 40 mg. We will call you/mychart you with recommendations.     See Dr. Méndez in 6 months with fasting labs prior.

## 2022-02-04 ENCOUNTER — APPOINTMENT (OUTPATIENT)
Dept: CT IMAGING | Facility: CLINIC | Age: 52
End: 2022-02-04
Attending: EMERGENCY MEDICINE
Payer: COMMERCIAL

## 2022-02-04 ENCOUNTER — HOSPITAL ENCOUNTER (EMERGENCY)
Facility: CLINIC | Age: 52
Discharge: HOME | End: 2022-02-04
Attending: EMERGENCY MEDICINE | Admitting: EMERGENCY MEDICINE
Payer: COMMERCIAL

## 2022-02-04 VITALS
OXYGEN SATURATION: 97 % | TEMPERATURE: 98.2 F | DIASTOLIC BLOOD PRESSURE: 98 MMHG | RESPIRATION RATE: 20 BRPM | HEART RATE: 77 BPM | SYSTOLIC BLOOD PRESSURE: 154 MMHG

## 2022-02-04 DIAGNOSIS — K80.20 GALLSTONES: ICD-10-CM

## 2022-02-04 DIAGNOSIS — N20.1 URETERAL STONE: ICD-10-CM

## 2022-02-04 LAB
ALBUMIN UR-MCNC: 30 MG/DL
ANION GAP SERPL CALCULATED.3IONS-SCNC: 3 MMOL/L (ref 3–14)
APPEARANCE UR: ABNORMAL
BILIRUB UR QL STRIP: NEGATIVE
BUN SERPL-MCNC: 18 MG/DL (ref 7–30)
CALCIUM SERPL-MCNC: 9.6 MG/DL (ref 8.5–10.1)
CAOX CRY #/AREA URNS HPF: ABNORMAL /HPF
CHLORIDE BLD-SCNC: 108 MMOL/L (ref 94–109)
CO2 SERPL-SCNC: 24 MMOL/L (ref 20–32)
COLOR UR AUTO: YELLOW
CREAT SERPL-MCNC: 0.98 MG/DL (ref 0.66–1.25)
GFR SERPL CREATININE-BSD FRML MDRD: >90 ML/MIN/1.73M2
GLUCOSE BLD-MCNC: 166 MG/DL (ref 70–99)
GLUCOSE UR STRIP-MCNC: NEGATIVE MG/DL
HGB UR QL STRIP: ABNORMAL
KETONES UR STRIP-MCNC: ABNORMAL MG/DL
LEUKOCYTE ESTERASE UR QL STRIP: ABNORMAL
MUCOUS THREADS #/AREA URNS LPF: PRESENT /LPF
NITRATE UR QL: NEGATIVE
PH UR STRIP: 6 [PH] (ref 5–7)
POTASSIUM BLD-SCNC: 4.7 MMOL/L (ref 3.4–5.3)
RBC URINE: >182 /HPF
SODIUM SERPL-SCNC: 135 MMOL/L (ref 133–144)
SP GR UR STRIP: 1.03 (ref 1–1.03)
UROBILINOGEN UR STRIP-MCNC: NORMAL MG/DL
WBC URINE: 13 /HPF

## 2022-02-04 PROCEDURE — 74176 CT ABD & PELVIS W/O CONTRAST: CPT

## 2022-02-04 PROCEDURE — 99285 EMERGENCY DEPT VISIT HI MDM: CPT | Mod: 25

## 2022-02-04 PROCEDURE — 258N000003 HC RX IP 258 OP 636: Performed by: EMERGENCY MEDICINE

## 2022-02-04 PROCEDURE — 87086 URINE CULTURE/COLONY COUNT: CPT | Performed by: EMERGENCY MEDICINE

## 2022-02-04 PROCEDURE — 250N000013 HC RX MED GY IP 250 OP 250 PS 637: Performed by: EMERGENCY MEDICINE

## 2022-02-04 PROCEDURE — 96361 HYDRATE IV INFUSION ADD-ON: CPT

## 2022-02-04 PROCEDURE — 96374 THER/PROPH/DIAG INJ IV PUSH: CPT

## 2022-02-04 PROCEDURE — 81001 URINALYSIS AUTO W/SCOPE: CPT | Performed by: EMERGENCY MEDICINE

## 2022-02-04 PROCEDURE — 80048 BASIC METABOLIC PNL TOTAL CA: CPT | Performed by: EMERGENCY MEDICINE

## 2022-02-04 PROCEDURE — 36415 COLL VENOUS BLD VENIPUNCTURE: CPT | Performed by: EMERGENCY MEDICINE

## 2022-02-04 PROCEDURE — 250N000011 HC RX IP 250 OP 636: Performed by: EMERGENCY MEDICINE

## 2022-02-04 PROCEDURE — 96375 TX/PRO/DX INJ NEW DRUG ADDON: CPT

## 2022-02-04 RX ORDER — HYDROCODONE BITARTRATE AND ACETAMINOPHEN 5; 325 MG/1; MG/1
2 TABLET ORAL ONCE
Status: COMPLETED | OUTPATIENT
Start: 2022-02-04 | End: 2022-02-04

## 2022-02-04 RX ORDER — ONDANSETRON 2 MG/ML
4 INJECTION INTRAMUSCULAR; INTRAVENOUS ONCE
Status: COMPLETED | OUTPATIENT
Start: 2022-02-04 | End: 2022-02-04

## 2022-02-04 RX ORDER — ONDANSETRON 4 MG/1
4 TABLET, ORALLY DISINTEGRATING ORAL EVERY 8 HOURS PRN
Qty: 10 TABLET | Refills: 0 | Status: SHIPPED | OUTPATIENT
Start: 2022-02-04 | End: 2022-02-07

## 2022-02-04 RX ORDER — TAMSULOSIN HYDROCHLORIDE 0.4 MG/1
0.4 CAPSULE ORAL DAILY
Qty: 9 CAPSULE | Refills: 0 | Status: SHIPPED | OUTPATIENT
Start: 2022-02-04 | End: 2022-02-10

## 2022-02-04 RX ORDER — TAMSULOSIN HYDROCHLORIDE 0.4 MG/1
0.4 CAPSULE ORAL ONCE
Status: COMPLETED | OUTPATIENT
Start: 2022-02-04 | End: 2022-02-04

## 2022-02-04 RX ORDER — HYDROMORPHONE HYDROCHLORIDE 1 MG/ML
0.5 INJECTION, SOLUTION INTRAMUSCULAR; INTRAVENOUS; SUBCUTANEOUS ONCE
Status: COMPLETED | OUTPATIENT
Start: 2022-02-04 | End: 2022-02-04

## 2022-02-04 RX ORDER — HYDROCODONE BITARTRATE AND ACETAMINOPHEN 5; 325 MG/1; MG/1
1-2 TABLET ORAL EVERY 6 HOURS PRN
Qty: 12 TABLET | Refills: 0 | Status: SHIPPED | OUTPATIENT
Start: 2022-02-04 | End: 2022-02-10

## 2022-02-04 RX ORDER — KETOROLAC TROMETHAMINE 15 MG/ML
15 INJECTION, SOLUTION INTRAMUSCULAR; INTRAVENOUS ONCE
Status: COMPLETED | OUTPATIENT
Start: 2022-02-04 | End: 2022-02-04

## 2022-02-04 RX ADMIN — ONDANSETRON 4 MG: 2 INJECTION INTRAMUSCULAR; INTRAVENOUS at 01:49

## 2022-02-04 RX ADMIN — TAMSULOSIN HYDROCHLORIDE 0.4 MG: 0.4 CAPSULE ORAL at 03:35

## 2022-02-04 RX ADMIN — SODIUM CHLORIDE 1000 ML: 9 INJECTION, SOLUTION INTRAVENOUS at 01:49

## 2022-02-04 RX ADMIN — KETOROLAC TROMETHAMINE 15 MG: 15 INJECTION, SOLUTION INTRAMUSCULAR; INTRAVENOUS at 01:50

## 2022-02-04 RX ADMIN — HYDROMORPHONE HYDROCHLORIDE 0.5 MG: 1 INJECTION, SOLUTION INTRAMUSCULAR; INTRAVENOUS; SUBCUTANEOUS at 01:50

## 2022-02-04 RX ADMIN — HYDROCODONE BITARTRATE AND ACETAMINOPHEN 2 TABLET: 5; 325 TABLET ORAL at 03:35

## 2022-02-04 ASSESSMENT — ENCOUNTER SYMPTOMS
NAUSEA: 1
FLANK PAIN: 1
APPETITE CHANGE: 0
SHORTNESS OF BREATH: 0
BACK PAIN: 1
ABDOMINAL PAIN: 0
HEMATURIA: 1
VOMITING: 1

## 2022-02-04 NOTE — ED PROVIDER NOTES
History     Chief Complaint:  Flank Pain    The history is provided by the patient.      Mor Monson is a 51 year old male with history of coronary artery disease, mild ascending aorta dilation, and hypertension who presents with flank pain. The patient reports a small amount of blood in his urine this morning, as well as some right flank soreness throughout the day. Then at 2200 he developed pain to the right flank/right lower back, along with nausea and vomiting. He says this feels similar to previous kidney stones in the past. Patient is eating and drinking normally. He denies any chest pain, abdominal pain, or shortness of breath.     Review of Systems   Constitutional: Negative for appetite change.   Respiratory: Negative for shortness of breath.    Cardiovascular: Negative for chest pain.   Gastrointestinal: Positive for nausea and vomiting. Negative for abdominal pain.   Genitourinary: Positive for flank pain and hematuria.   Musculoskeletal: Positive for back pain.   All other systems reviewed and are negative.    Allergies:  The patient has no known allergies.    Medications:    Norvasc  Toprol  Crestor    Past Medical History:    Mild ascending aorta dilation  Palpitations  Fluid overload  CAD  Coronary atherosclerosis  Hypertension  Tinea versicolor  Tympanic membrane perforation  BCC  Chronic mucoid otitis media  Depression  Anxiety     Past Surgical History:    CABG  Coronary angiogram  Intra aortic balloon  Left heart chat  Tonsillectomy  Tympanostomy tube placement     Family History:    Brother: diabetes     Social History:  The patient presents to the ED alone.     Physical Exam     Patient Vitals for the past 24 hrs:   BP Temp Temp src Pulse Resp SpO2   02/04/22 0316 -- -- -- -- -- 95 %   02/04/22 0233 (!) 154/98 -- -- 77 -- --   02/04/22 0210 -- -- -- -- -- 96 %   02/04/22 0209 -- -- -- -- -- 99 %   02/04/22 0208 -- -- -- -- -- 98 %   02/04/22 0207 -- -- -- -- -- 99 %   02/04/22 0113 (!)  146/104 98.2  F (36.8  C) Oral 80 20 99 %       Physical Exam  Gen: alert  HEENT: PERRL, oropharynx clear  Neck: normal ROM  CV: RRR, no murmurs  Pulm: breath sounds equal, lungs clear  Abd: Soft, nontender  Back: no evidence of injury, no cva tenderness  MSK: no deformity, moves all extremities  Skin: no rash  Neuro: alert, appropriate conversation and interaction    Emergency Department Course       Imaging:  Abd/pelvis CT no contrast - Stone Protocol   Final Result   IMPRESSION:    1.  Mild right hydronephrosis caused by a 4 mm ureteropelvic junction stone.   2.  Cholelithiasis.             Laboratory:  Labs Ordered and Resulted from Time of ED Arrival to Time of ED Departure   ROUTINE UA WITH MICROSCOPIC - Abnormal       Result Value    Color Urine Yellow      Appearance Urine Slightly Cloudy (*)     Glucose Urine Negative      Bilirubin Urine Negative      Ketones Urine Trace (*)     Specific Gravity Urine 1.026      Blood Urine Large (*)     pH Urine 6.0      Protein Albumin Urine 30  (*)     Urobilinogen Urine Normal      Nitrite Urine Negative      Leukocyte Esterase Urine Small (*)     Mucus Urine Present (*)     Calcium Oxalate Crystals Urine Few (*)     RBC Urine >182 (*)     WBC Urine 13 (*)    BASIC METABOLIC PANEL - Abnormal    Sodium 135      Potassium 4.7      Chloride 108      Carbon Dioxide (CO2) 24      Anion Gap 3      Urea Nitrogen 18      Creatinine 0.98      Calcium 9.6      Glucose 166 (*)     GFR Estimate >90       Emergency Department Course:       Reviewed:  I reviewed nursing notes, vitals, past history and care everywhere    Assessments:  1040 I obtained history and examined the patient as noted above.   1524 I rechecked the patient and explained findings.        Interventions:  0149 NS 1 L IV   0149 Zofran 4 mg IV   0150 Toradol 15 mg IV   0150 Dilaudid 0.5 mg IV     Disposition:  The patient was discharged to home.    Impression & Plan         Medical Decision Making:  Medical Decision  Making:  Evaluation today was consistent with nephrolithiasis and renal colic.  This explains the patient's pain.  Based on hx and exam and the above studies, I do not feel that there is evidence of other acute intraabdominal process at this time.  CT scan showed mild right hydronephrosis caused by a 4 mm ureteropelvic junction stone. UA was obtained and no overt signs of infection are present.  Given the presence of the kidney stone, urine culture was sent.  The patient received pain control, antiemetic and IV fluids as documented above.  Pain and nausea was well controlled with these measures.  The patient was able to tolerate PO.  Based on the patient's good response to symptomatic control, I feel that this patient can be managed expectantly with close outpatient follow up within the next 2 days.   The patient was instructed to return to the emergency department for uncontrolled pain, fever or inability to tolerate oral liquids.  Prescriptions for Norco, Zofran, and Flomax were given.    Critical Care time:  none    Diagnosis:    ICD-10-CM    1. Ureteral stone  N20.1    2. Gallstones  K80.20        Discharge Medications:  New Prescriptions    HYDROCODONE-ACETAMINOPHEN (NORCO) 5-325 MG TABLET    Take 1-2 tablets by mouth every 6 hours as needed for pain    ONDANSETRON (ZOFRAN ODT) 4 MG ODT TAB    Take 1 tablet (4 mg) by mouth every 8 hours as needed for nausea or vomiting    TAMSULOSIN (FLOMAX) 0.4 MG CAPSULE    Take 1 capsule (0.4 mg) by mouth daily for 9 days     Scribe Disclosure:  Devante DIAZ, am serving as a scribe at 1:28 AM on 2/4/2022 to document services personally performed by Maria Del Carmen Gaviria MD based on my observations and the provider's statements to me.      Maria Del Carmen Gaviria MD  02/04/22 0411

## 2022-02-04 NOTE — ED TRIAGE NOTES
Pt states right sided flank pain since this afternoon. Pt also notes vomiting and hematuria. Pt states symptoms are similar to previous kidney stones. ABCs intact GCS 15

## 2022-02-05 LAB — BACTERIA UR CULT: NORMAL

## 2022-02-10 ENCOUNTER — VIRTUAL VISIT (OUTPATIENT)
Dept: UROLOGY | Facility: CLINIC | Age: 52
End: 2022-02-10
Payer: COMMERCIAL

## 2022-02-10 VITALS — HEIGHT: 72 IN | WEIGHT: 250 LBS | BODY MASS INDEX: 33.86 KG/M2

## 2022-02-10 DIAGNOSIS — N20.1 URETERAL STONE: Primary | ICD-10-CM

## 2022-02-10 DIAGNOSIS — R10.9 FLANK PAIN: ICD-10-CM

## 2022-02-10 PROCEDURE — 99213 OFFICE O/P EST LOW 20 MIN: CPT | Mod: GT | Performed by: PHYSICIAN ASSISTANT

## 2022-02-10 RX ORDER — HYDROCODONE BITARTRATE AND ACETAMINOPHEN 5; 325 MG/1; MG/1
1-2 TABLET ORAL EVERY 6 HOURS PRN
Qty: 12 TABLET | Refills: 0 | Status: SHIPPED | OUTPATIENT
Start: 2022-02-10 | End: 2022-11-07

## 2022-02-10 RX ORDER — TAMSULOSIN HYDROCHLORIDE 0.4 MG/1
0.4 CAPSULE ORAL DAILY
Qty: 30 CAPSULE | Refills: 0 | Status: SHIPPED | OUTPATIENT
Start: 2022-02-10 | End: 2022-11-07

## 2022-02-10 ASSESSMENT — ENCOUNTER SYMPTOMS
BACK PAIN: 1
SHORTNESS OF BREATH: 0
CHILLS: 0
VOMITING: 0
FEVER: 0
HEMATURIA: 0
NAUSEA: 0
DYSURIA: 0
FLANK PAIN: 1

## 2022-02-10 ASSESSMENT — MIFFLIN-ST. JEOR: SCORE: 2016.99

## 2022-02-10 ASSESSMENT — PAIN SCALES - GENERAL: PAINLEVEL: MILD PAIN (2)

## 2022-02-10 NOTE — PROGRESS NOTES
** Send link to cell phone    Pt in pain, mostly at night. Less bothersome during the day    Sunil is a 51 year old who is being evaluated via a billable video visit.      How would you like to obtain your AVS? MyChart  If the video visit is dropped, the invitation should be resent by: Text to cell phone: [phone]  Will anyone else be joining your video visit? No      Video-Visit Details    Type of service:  Video Visit    Video Start Time: 1531    Video End Time:1546    Originating Location (pt. Location): Home    Distant Location (provider location):  [facility] [place]     Platform used for Video Visit: [employer]     Subjective      REQUESTING PROVIDER   No ref. provider found     REASON FOR CONSULT   Flank pain  Ureteral stone    HISTORY OF PRESENT ILLNESS   Mr. Monson is a very pleasant 51-year-old gentleman, who presents today for further evaluation recommendations regarding a recently detected ureteral stone.  He was seen in the emergency department on 02/04/2022 for flank pain.  He also had a small amount of hematuria.  He continues have some right flank pain throughout the night and then became very severe in the right flank and lower back.  He also endorsed episodes of nausea and vomiting.    Patient had an episode of kidney stones approximately 10 years ago, and he was able to pass this on his own.  Family history of kidney stones in his brothers.    Patient underwent CT imaging which showed a 4 mm stone at the right UPJ with some bilateral perinephric stranding.,  Right side greater than left.  Creatinine was within normal limits at 0.98 with EGFR greater than 90.  Urinalysis was not convincing for infection.    Patient was discharged with a trial of passage.  He does note that the pain comes and goes.  It is worse at night.  He notes that most of the time it is more annoying.  He did need to take a Norco last night.  He is not currently straining his urine.  He currently would  rate his pain 1-2/10.    No urinary symptomatology including hematuria, dysuria, urgency, or frequency of urination.  Nausea and vomiting have resolved.    The following portions of the patient's history were reviewed and updated as appropriate: allergies, current medications, past family history, past medical history, past social history, past surgical history and problem list.     REVIEW OF SYSTEMS   Review of Systems   Constitutional: Negative for chills and fever.   Respiratory: Negative for shortness of breath.    Cardiovascular: Negative for chest pain.   Gastrointestinal: Negative for nausea and vomiting.   Genitourinary: Positive for flank pain. Negative for dysuria and hematuria.   Musculoskeletal: Positive for back pain.      Per HPI.     Patient Active Problem List   Diagnosis     Unstable angina (H)     Coronary artery disease     Coronary artery disease involving native coronary artery of native heart with unstable angina pectoris (H)     S/P CABG (coronary artery bypass graft)     Fluid overload     Transient hyperglycemia post procedure     Mild ascending aorta dilation (H)     Palpitations      Past Medical History:   Diagnosis Date     Hernia, abdominal      Palpitations       Past Surgical History:   Procedure Laterality Date     BYPASS GRAFT ARTERY CORONARY N/A 1/9/2021    Procedure: CORONARY ARTERY BYPASS GRAFT (CABG), On pump, Fermin, Radial vein graft LIMA-LAD Radial artery graft-OM;  Surgeon: Jacky Rojas MD;  Location:  OR      CORONARY ANGIOGRAM N/A 1/8/2021    Procedure: Coronary Angiogram;  Surgeon: Gabino Quevedo MD;  Location: [facility] CARDIAC CATH LAB     CV INTRA AORTIC BALLOON N/A 1/9/2021    Procedure: Intra-Aortic Balloon;  Surgeon: Mor Singh MD;  Location:  HEART CARDIAC CATH LAB     CV LEFT HEART CATH N/A 1/8/2021    Procedure: Left Heart Cath;  Surgeon: Gabino Quevedo MD;  Location:  HEART CARDIAC CATH LAB     HERNIA REPAIR        Social  "History:   .   Never smoker.    Family History:   Family history nephrolithiasis and 3 brothers who gets stones \"all the time.\"    Objective      PHYSICAL EXAM   GENERAL: Healthy, alert and no distress  EYES: Eyes grossly normal to inspection.  No discharge or erythema, or obvious scleral/conjunctival abnormalities.  HENT: Normal cephalic/atraumatic.  External ears, nose and mouth without ulcers or lesions.  No nasal drainage visible.  NECK: No asymmetry, visible masses or scars  RESP: No audible wheeze, cough, or visible cyanosis.  No visible retractions or increased work of breathing.    MS: No gross musculoskeletal defects noted.  Normal range of motion.  No visible edema.  SKIN: Visible skin clear. No significant rash, abnormal pigmentation or lesions.  NEURO: Cranial nerves grossly intact.  Mentation and speech appropriate for age.  PSYCH: Mentation appears normal, affect normal/bright, judgement and insight intact, normal speech and appearance well-groomed.     LABORATORY   Lab Results   Component Value Date    CR 0.98 02/04/2022      Recent Labs   Lab Test 02/04/22  0231   COLOR Yellow   APPEARANCE Slightly Cloudy*   URINEGLC Negative   URINEBILI Negative   URINEKETONE Trace*   SG 1.026   UBLD Large*   URINEPH 6.0   PROTEIN 30 *   NITRITE Negative   LEUKEST Small*   RBCU >182*   WBCU 13*     Urine culture: Mixed crystal.    IMAGING     I personally reviewed the images.     Abd/pelvis CT no contrast - Stone Protocol    Result Date: 2/4/2022  EXAM: CT ABDOMEN PELVIS W/O CONTRAST LOCATION: [facility] DATE/TIME: 2/4/2022 2:13 AM INDICATION: Flank pain, kidney stone suspected COMPARISON: 2/18/2021. TECHNIQUE: CT scan of the abdomen and pelvis was performed without IV contrast. Multiplanar reformats were obtained. Dose reduction techniques were used. CONTRAST: None. FINDINGS: LOWER CHEST: Previous median sternotomy. Minimal dependent atelectasis and scarring at the lung bases. The heart " is at the upper limits of normal in size. HEPATOBILIARY: Stone in the gallbladder. PANCREAS: Normal. SPLEEN: Normal. ADRENAL GLANDS: Normal. KIDNEYS/BLADDER: There is mild dilatation of the right renal collecting system caused by a 4 mm ureteropelvic junction stone. There are no other urinary stones. BOWEL: Normal. LYMPH NODES: Normal. VASCULATURE: Unremarkable. PELVIC ORGANS: Normal. MUSCULOSKELETAL: Normal.     IMPRESSION: 1.  Mild right hydronephrosis caused by a 4 mm ureteropelvic junction stone. 2.  Cholelithiasis.      Assessment & Plan    1. Ureteral stone    2. Flank pain      I had the pleasure today of meeting with Mr. Monson to discussed his a 4 mm right-sided calculus with associated hydronephrosis. Symptoms currently controlled. Given size and location of stone, and fact that symptoms are well controlled, it is reasonable to continue with trial of medical expulsive therapy at this time.   - Continue tamsulosin 0.4 mg daily. Refills provided.  - Continue to push fluids. Strain all urine and submit any captured stones for analysis.  Patient will come in to pick a strainer and a hat up tomorrow.  - Norco and ibuprofen for pain.  Refills provided.  - Follow up in 1-1.5 weeks. If the stone passes prior to f/u appointment, pt will bring in for stone analysis.  We will order a CT of the abdomen pelvis without contrast.  - Warning signs discussed including fevers, chills, gross hematuria, severe pain that is refractory to medications or uncontrolled nausea and vomiting, which should prompt more urgent evaluation. Patient understands to proceed to the ER should these warning signs occur outside of clinic hours.    During counseling for this visit, we covered the natural history of kidney stones, the risk of progression to symptomatic pain/infection, and the possibility of renal failure/kidney damage.  We covered treatment options including medical expulsive therapy, ureteroscopy/laser/stent, extracorporeal shock  wave lithotripsy (ESWL), and percutaneous nephrolithotomy (PCNL).      -If patient requires surgical intervention, this would be likely cystoscopy, right ureteroscopy laser lithotripsy and basketing, and right ureteral stent placement.    We discussed possible side effects with an indwelling ureteral stent such as urgency and frequency of urination, dysuria, hematuria, symptoms of urine reflux, and some achiness in the side. Indwelling ureteral stents need to be exchanged every three months or removed by three months.     Standard recommendations on kidney stone prevention were provided.    Signed by:     Brittaney Blackmon PA-C 2/10/2022 4:54 PM

## 2022-02-10 NOTE — LETTER
2/10/2022       RE: Mor Monson  [address]  [place] [address]     Dear Colleague,    Thank you for referring your patient, Mor Monson, to the [facility] [place] [facility]. Please see a copy of my visit note below.    ** Send link to cell phone    Pt in pain, mostly at night. Less bothersome during the day    Sunil is a 51 year old who is being evaluated via a billable video visit.      How would you like to obtain your AVS? MyChart  If the video visit is dropped, the invitation should be resent by: Text to cell phone: [phone]  Will anyone else be joining your video visit? No      Video-Visit Details    Type of service:  Video Visit    Video Start Time: 1531    Video End Time:1546    Originating Location (pt. Location): Home    Distant Location (provider location):  [facility] UROLOGY [facility]     Platform used for Video Visit: Luisa Mcfadden      REQUESTING PROVIDER   No ref. provider found     REASON FOR CONSULT   Flank pain  Ureteral stone    HISTORY OF PRESENT ILLNESS   Mr. Monson is a very pleasant 51-year-old gentleman, who presents today for further evaluation recommendations regarding a recently detected ureteral stone.  He was seen in the emergency department on 02/04/2022 for flank pain.  He also had a small amount of hematuria.  He continues have some right flank pain throughout the night and then became very severe in the right flank and lower back.  He also endorsed episodes of nausea and vomiting.    Patient had an episode of kidney stones approximately 10 years ago, and he was able to pass this on his own.  Family history of kidney stones in his brothers.    Patient underwent CT imaging which showed a 4 mm stone at the right UPJ with some bilateral perinephric stranding.,  Right side greater than left.  Creatinine was within normal limits at 0.98 with EGFR greater than 90.   Urinalysis was not convincing for infection.    Patient was discharged with a trial of passage.  He does note that the pain comes and goes.  It is worse at night.  He notes that most of the time it is more annoying.  He did need to take a Norco last night.  He is not currently straining his urine.  He currently would rate his pain 1-2/10.    No urinary symptomatology including hematuria, dysuria, urgency, or frequency of urination.  Nausea and vomiting have resolved.    The following portions of the patient's history were reviewed and updated as appropriate: allergies, current medications, past family history, past medical history, past social history, past surgical history and problem list.     REVIEW OF SYSTEMS   Review of Systems   Constitutional: Negative for chills and fever.   Respiratory: Negative for shortness of breath.    Cardiovascular: Negative for chest pain.   Gastrointestinal: Negative for nausea and vomiting.   Genitourinary: Positive for flank pain. Negative for dysuria and hematuria.   Musculoskeletal: Positive for back pain.      Per HPI.     Patient Active Problem List   Diagnosis     Unstable angina (H)     Coronary artery disease     Coronary artery disease involving native coronary artery of native heart with unstable angina pectoris (H)     S/P CABG (coronary artery bypass graft)     Fluid overload     Transient hyperglycemia post procedure     Mild ascending aorta dilation (H)     Palpitations      Past Medical History:   Diagnosis Date     Hernia, abdominal      Palpitations       Past Surgical History:   Procedure Laterality Date     BYPASS GRAFT ARTERY CORONARY N/A 1/9/2021    Procedure: CORONARY ARTERY BYPASS GRAFT (CABG), On pump, Fermin, Radial vein graft LIMA-LAD Radial artery graft-OM;  Surgeon: Jacky Rojas MD;  Location:  OR     CV CORONARY ANGIOGRAM N/A 1/8/2021    Procedure: Coronary Angiogram;  Surgeon: Gabino Quevedo MD;  Location: [facility] CARDIAC CATH LAB      "CV INTRA AORTIC BALLOON N/A 1/9/2021    Procedure: Intra-Aortic Balloon;  Surgeon: Mor Singh MD;  Location:  HEART CARDIAC CATH LAB     CV LEFT HEART CATH N/A 1/8/2021    Procedure: Left Heart Cath;  Surgeon: Gabino Quevedo MD;  Location:  HEART CARDIAC CATH LAB     HERNIA REPAIR        Social History:   .   Never smoker.    Family History:   Family history nephrolithiasis and 3 brothers who gets stones \"all the time.\"    Objective      PHYSICAL EXAM   GENERAL: Healthy, alert and no distress  EYES: Eyes grossly normal to inspection.  No discharge or erythema, or obvious scleral/conjunctival abnormalities.  HENT: Normal cephalic/atraumatic.  External ears, nose and mouth without ulcers or lesions.  No nasal drainage visible.  NECK: No asymmetry, visible masses or scars  RESP: No audible wheeze, cough, or visible cyanosis.  No visible retractions or increased work of breathing.    MS: No gross musculoskeletal defects noted.  Normal range of motion.  No visible edema.  SKIN: Visible skin clear. No significant rash, abnormal pigmentation or lesions.  NEURO: Cranial nerves grossly intact.  Mentation and speech appropriate for age.  PSYCH: Mentation appears normal, affect normal/bright, judgement and insight intact, normal speech and appearance well-groomed.     LABORATORY   Lab Results   Component Value Date    CR 0.98 02/04/2022      Recent Labs   Lab Test 02/04/22  0231   COLOR Yellow   APPEARANCE Slightly Cloudy*   URINEGLC Negative   URINEBILI Negative   URINEKETONE Trace*   SG 1.026   UBLD Large*   URINEPH 6.0   PROTEIN 30 *   NITRITE Negative   LEUKEST Small*   RBCU >182*   WBCU 13*     Urine culture: Mixed crystal.    IMAGING     I personally reviewed the images.     Abd/pelvis CT no contrast - Stone Protocol    Result Date: 2/4/2022  EXAM: CT ABDOMEN PELVIS W/O CONTRAST LOCATION: [facility] DATE/TIME: 2/4/2022 2:13 AM INDICATION: Flank pain, kidney stone suspected " COMPARISON: 2/18/2021. TECHNIQUE: CT scan of the abdomen and pelvis was performed without IV contrast. Multiplanar reformats were obtained. Dose reduction techniques were used. CONTRAST: None. FINDINGS: LOWER CHEST: Previous median sternotomy. Minimal dependent atelectasis and scarring at the lung bases. The heart is at the upper limits of normal in size. HEPATOBILIARY: Stone in the gallbladder. PANCREAS: Normal. SPLEEN: Normal. ADRENAL GLANDS: Normal. KIDNEYS/BLADDER: There is mild dilatation of the right renal collecting system caused by a 4 mm ureteropelvic junction stone. There are no other urinary stones. BOWEL: Normal. LYMPH NODES: Normal. VASCULATURE: Unremarkable. PELVIC ORGANS: Normal. MUSCULOSKELETAL: Normal.     IMPRESSION: 1.  Mild right hydronephrosis caused by a 4 mm ureteropelvic junction stone. 2.  Cholelithiasis.      Assessment & Plan    1. Ureteral stone    2. Flank pain      I had the pleasure today of meeting with Mr. Monson to discussed his a 4 mm right-sided calculus with associated hydronephrosis. Symptoms currently controlled. Given size and location of stone, and fact that symptoms are well controlled, it is reasonable to continue with trial of medical expulsive therapy at this time.   - Continue tamsulosin 0.4 mg daily. Refills provided.  - Continue to push fluids. Strain all urine and submit any captured stones for analysis.  Patient will come in to pick a strainer and a hat up tomorrow.  - Norco and ibuprofen for pain.  Refills provided.  - Follow up in 1-1.5 weeks. If the stone passes prior to f/u appointment, pt will bring in for stone analysis.  We will order a CT of the abdomen pelvis without contrast.  - Warning signs discussed including fevers, chills, gross hematuria, severe pain that is refractory to medications or uncontrolled nausea and vomiting, which should prompt more urgent evaluation. Patient understands to proceed to the ER should these warning signs occur outside of  clinic hours.    During counseling for this visit, we covered the natural history of kidney stones, the risk of progression to symptomatic pain/infection, and the possibility of renal failure/kidney damage.  We covered treatment options including medical expulsive therapy, ureteroscopy/laser/stent, extracorporeal shock wave lithotripsy (ESWL), and percutaneous nephrolithotomy (PCNL).      -If patient requires surgical intervention, this would be likely cystoscopy, right ureteroscopy laser lithotripsy and basketing, and right ureteral stent placement.    We discussed possible side effects with an indwelling ureteral stent such as urgency and frequency of urination, dysuria, hematuria, symptoms of urine reflux, and some achiness in the side. Indwelling ureteral stents need to be exchanged every three months or removed by three months.     Standard recommendations on kidney stone prevention were provided.    Signed by:     Brittaney Blackmon PA-C 2/10/2022 4:54 PM

## 2022-02-10 NOTE — PATIENT INSTRUCTIONS
- Continue tamsulosin 0.4 mg daily. Refills provided.  - Continue to push fluids. Strain all urine and submit any captured stones for analysis.  Pick a strainer and a hat up tomorrow.  - Norco and ibuprofen for pain.  Refills provided.  - Follow up in 1-1.5 weeks. If the stone passes prior to f/u appointment, pt will bring in for stone analysis.  We will order a CT of the abdomen pelvis without contrast.  - Warning signs discussed including fevers, chills, gross hematuria, severe pain that is refractory to medications or uncontrolled nausea and vomiting, which should prompt more urgent evaluation. Patient understands to proceed to the ER should these warning signs occur outside of clinic hours.    -If surgical intervention required, this would be likely cystoscopy, right ureteroscopy laser lithotripsy and basketing, and right ureteral stent placement.    Standard recommendations on kidney stone prevention:  -These include maintaining fluid intake of 3 liters per day or more with a goal of making 2 or more liters of urine per day.  If alcoholic or caffeinated beverages are consumed, you need to drink water along with these beverages to maintain hydration.    -A few ounces of lemon juice concentrate a day diluted in water can help prevent stones (citrate is a stone inhibitor).    -Sodium influences calcium excretion in the urine. Limit intake of red meat, salt, and salty processed foods.    -Uric acid-high levels in the blood can lead to kidney stones and gout, especially if the urine pH is low.  Reduce her protein intake, especially red meats.  -Weight loss, if overweight, can reduce the recurrence of kidney stones.  -Diabetes-if diabetic, you are at a greater risk of having kidney stones.  -Maintain calcium intake in diet through continued consumption of dairy products etc.    -Limit foods that are high in oxalate such as spinach, sweet potatoes, dark chocolate, soy products, and some nuts such as peanuts.    -Medications that can increase risk of kidney stones: Ephedrine, Guaifenesin, Triamterene, Lasix, Diamox, Topamax, Zonegran, laxatives.     -Additional/different recommendations pending any stone analysis.

## 2022-02-14 ENCOUNTER — HOSPITAL ENCOUNTER (OUTPATIENT)
Dept: ULTRASOUND IMAGING | Facility: CLINIC | Age: 52
Discharge: HOME | End: 2022-02-14
Attending: PHYSICIAN ASSISTANT | Admitting: PHYSICIAN ASSISTANT
Payer: COMMERCIAL

## 2022-02-14 DIAGNOSIS — I83.90 ASYMPTOMATIC VARICOSE VEINS: ICD-10-CM

## 2022-02-14 PROCEDURE — 93970 EXTREMITY STUDY: CPT

## 2022-02-18 DIAGNOSIS — I83.90 ASYMPTOMATIC VARICOSE VEINS: Primary | ICD-10-CM

## 2022-03-01 ENCOUNTER — HOSPITAL ENCOUNTER (OUTPATIENT)
Dept: CARDIOLOGY | Facility: CLINIC | Age: 52
Discharge: HOME | End: 2022-03-01
Attending: PHYSICIAN ASSISTANT | Admitting: PHYSICIAN ASSISTANT
Payer: COMMERCIAL

## 2022-03-01 DIAGNOSIS — I83.90 ASYMPTOMATIC VARICOSE VEINS: ICD-10-CM

## 2022-03-01 PROCEDURE — 93971 EXTREMITY STUDY: CPT | Mod: LT

## 2022-03-01 PROCEDURE — 99207 US VENOUS COMPETENCY LEFT: CPT | Mod: 26 | Performed by: INTERNAL MEDICINE

## 2022-03-03 DIAGNOSIS — I83.90 ASYMPTOMATIC VARICOSE VEINS: Primary | ICD-10-CM

## 2022-03-17 DIAGNOSIS — I83.892 VARICOSE VEINS OF LEFT LOWER EXTREMITY WITH OTHER COMPLICATIONS: Primary | ICD-10-CM

## 2022-03-17 NOTE — PROGRESS NOTES
Dr. Eric's recommendations.      I would do RF left GSV (because he's not medicare age) and sclero and phlebectomy for the varicose veins thanks

## 2022-04-05 DIAGNOSIS — Z95.1 S/P CABG (CORONARY ARTERY BYPASS GRAFT): ICD-10-CM

## 2022-04-05 RX ORDER — METOPROLOL SUCCINATE 50 MG/1
75 TABLET, EXTENDED RELEASE ORAL DAILY
Qty: 135 TABLET | Refills: 0 | Status: SHIPPED | OUTPATIENT
Start: 2022-04-05 | End: 2022-07-18

## 2022-06-30 ENCOUNTER — TELEPHONE (OUTPATIENT)
Dept: CARDIOLOGY | Facility: CLINIC | Age: 52
End: 2022-06-30

## 2022-06-30 NOTE — TELEPHONE ENCOUNTER
Received email from [facility] regarding patient's upcoming vein ablation in August 2022:        Reviewed chart. Last few visits with Ana Rodas do not mention conservative therapy or symptoms of veins. Will route to Dr. Eric and Daljit for review.

## 2022-07-01 ENCOUNTER — TELEPHONE (OUTPATIENT)
Dept: CARDIOLOGY | Facility: CLINIC | Age: 52
End: 2022-07-01

## 2022-07-01 NOTE — TELEPHONE ENCOUNTER
RN called and left VM at phone number listed below requesting callback to clarify whatever is needed.

## 2022-07-01 NOTE — TELEPHONE ENCOUNTER
[facility] Call Center    Phone Message    May a detailed message be left on voicemail: yes     Reason for Call: Other: The [employer] Appeals department called to get clarification on what needs to be authorized for Sunil. They received two authorizations on the same day and they were similar but each had some different information. They would like to know exactly what needs to he authorized by them. Please reach back out to them at [phone] and this is a secure line to leave a detailed message about which authorization is the correct one. Thank you!    Action Taken: Other: Cardiology    Travel Screening: Not Applicable

## 2022-07-06 ENCOUNTER — OFFICE VISIT (OUTPATIENT)
Dept: CARDIOLOGY | Facility: CLINIC | Age: 52
End: 2022-07-06
Payer: COMMERCIAL

## 2022-07-06 DIAGNOSIS — I83.892 VARICOSE VEINS OF LEFT LOWER EXTREMITY WITH OTHER COMPLICATIONS: Primary | ICD-10-CM

## 2022-07-06 PROCEDURE — 99207 PR NO CHARGE LOS: CPT | Performed by: INTERNAL MEDICINE

## 2022-07-06 NOTE — LETTER
7/6/2022    Asad Goodwin  [address] [address] [facility] [facility] [address]    RE: Mor Monson       Dear Colleague,     I had the pleasure of seeing Mor Monson in the [facility].  Vascular cardiology update:    Discussed patient's symptoms related to his varicose veins and saphenous vein reflux.    Patient has attempted compression stockings for greater than 3 months but still having symptoms of leg pain and soreness with his varicose veins.    Patient does have discomfort at the varicose veins and leg heaviness and soreness in the affected leg which strongly suggests that the reflux and varicosity is the source of his symptoms.    Patient requires going up and down on ladders for work and the pain in his affected leg affects his ability to do his work.    Patient also has restless legs that sometimes awaken him from sleep.    From the description of his symptoms and the severely refluxing and severely dilated left greater saphenous vein with 7.0 seconds of reflux, 6.8 mm and the large, severely refluxing varicose veins 6.5 mm, 7.0 seconds in the calf that are causing unilateral symptoms likely related to the severe reflux and varicosity, patient would be a good candidate for radiofrequency ablation of the greater saphenous vein with sclerotherapy and phlebectomy of the varicose vein due to the refractory nature of his symptoms to conservative therapy.    Thank you for allowing me to participate in the care of your patient.    Sincerely,   Richard Eric MD   [facility]  cc: No referring provider defined for this encounter.

## 2022-07-06 NOTE — TELEPHONE ENCOUNTER
Jeaneth, MD Bryon Herring Ashley, RN; Daljit Rodas PA-C 5 days ago       I tried calling him but no answer. I left him a message to call us.     If he calls back, would like him to answer the following questions and we can update the chart (I can do it as a clinical note).     1. Have you attempted compression stockings for greater than 6 weeks and still having symptoms.   2. Do you have discomfort at the varicose veins or leg heaviness at the end of the day in the affected leg.   3. Leg fatigue or restless legs?   4. Does it bother you to the point where it affects how much you'd like to be able to do in your life.     Thanks!      1.Yes for 3 months and still having symptoms of leg pain and soreness with varicose veins  2. Yes, especially when he goes on ladders a lot during the day for work. They become very sore throughout the day.   3. Leg fatigue, not every night but will get restless legs when trying to go to bed. Will occasionally wake him up at night.   4. Yes. They hurt during work and he has a hard time with the ladders.     Will route to Dr. Eric to update.

## 2022-07-06 NOTE — PROGRESS NOTES
Vascular cardiology update:    Discussed patient's symptoms related to his varicose veins and saphenous vein reflux.    Patient has attempted compression stockings for greater than 3 months but still having symptoms of leg pain and soreness with his varicose veins.    Patient does have discomfort at the varicose veins and leg heaviness and soreness in the affected leg which strongly suggests that the reflux and varicosity is the source of his symptoms.    Patient requires going up and down on ladders for work and the pain in his affected leg affects his ability to do his work.    Patient also has restless legs that sometimes awaken him from sleep.    From the description of his symptoms and the severely refluxing and severely dilated left greater saphenous vein with 7.0 seconds of reflux, 6.8 mm and the large, severely refluxing varicose veins 6.5 mm, 7.0 seconds in the calf that are causing unilateral symptoms likely related to the severe reflux and varicosity, patient would be a good candidate for radiofrequency ablation of the greater saphenous vein with sclerotherapy and phlebectomy of the varicose vein due to the refractory nature of his symptoms to conservative therapy.

## 2022-07-18 DIAGNOSIS — Z95.1 S/P CABG (CORONARY ARTERY BYPASS GRAFT): ICD-10-CM

## 2022-07-18 RX ORDER — METOPROLOL SUCCINATE 50 MG/1
75 TABLET, EXTENDED RELEASE ORAL DAILY
Qty: 135 TABLET | Refills: 3 | Status: SHIPPED | OUTPATIENT
Start: 2022-07-18 | End: 2022-11-07

## 2022-08-11 ENCOUNTER — TELEPHONE (OUTPATIENT)
Dept: CARDIOLOGY | Facility: CLINIC | Age: 52
End: 2022-08-11

## 2022-08-11 DIAGNOSIS — I83.892 VARICOSE VEINS OF LEFT LOWER EXTREMITY WITH OTHER COMPLICATIONS: Primary | ICD-10-CM

## 2022-08-11 RX ORDER — DIAZEPAM 5 MG
TABLET ORAL
Qty: 2 TABLET | Refills: 0 | COMMUNITY
Start: 2022-08-11 | End: 2023-12-18

## 2022-08-11 NOTE — TELEPHONE ENCOUNTER
Called patient to review Pre- Ablation orders:    Patient will increase fluid the day before the procedure.  Patient will hold diuretic until after the ablation.  Patient will not wear compression stockings the day before or the day of the ablation.  Valium was explained to patient and ordered to pharmacy of choice.  Patient aware to bring Valium to clinic - rx called into [facility] in [place]  Patient will have a  to bring them home.  Follow-up ultrasound for Wednesday scheduled.  Follow-up OV for 1 month scheduled.   Patient will do home COVID test and take picture to bring with to appointment.    Patient has no questions.

## 2022-08-12 ENCOUNTER — LAB (OUTPATIENT)
Dept: LAB | Facility: CLINIC | Age: 52
End: 2022-08-12
Payer: COMMERCIAL

## 2022-08-12 ENCOUNTER — DOCUMENTATION ONLY (OUTPATIENT)
Dept: LAB | Facility: CLINIC | Age: 52
End: 2022-08-12

## 2022-08-12 DIAGNOSIS — Z95.1 S/P CABG (CORONARY ARTERY BYPASS GRAFT): ICD-10-CM

## 2022-08-12 LAB
HOLD SPECIMEN: NORMAL
HOLD SPECIMEN: NORMAL

## 2022-08-12 PROCEDURE — 80061 LIPID PANEL: CPT

## 2022-08-12 PROCEDURE — 36415 COLL VENOUS BLD VENIPUNCTURE: CPT

## 2022-08-12 NOTE — PROGRESS NOTES
Sunil had an appointment with the [facility] today. We arlin his lipid panel for him, but he believes there may be additional labs needed. Extra red gel, and purple tube drawn. Please place any add on orders as needed. Thank you.

## 2022-08-13 LAB
CHOLEST SERPL-MCNC: 144 MG/DL
FASTING STATUS PATIENT QL REPORTED: YES
HDLC SERPL-MCNC: 51 MG/DL
LDLC SERPL CALC-MCNC: 67 MG/DL
NONHDLC SERPL-MCNC: 93 MG/DL
TRIGL SERPL-MCNC: 130 MG/DL

## 2022-08-15 ENCOUNTER — ANCILLARY PROCEDURE (OUTPATIENT)
Dept: VASCULAR ULTRASOUND | Facility: CLINIC | Age: 52
End: 2022-08-15
Attending: INTERNAL MEDICINE
Payer: COMMERCIAL

## 2022-08-15 VITALS
HEART RATE: 65 BPM | SYSTOLIC BLOOD PRESSURE: 132 MMHG | DIASTOLIC BLOOD PRESSURE: 90 MMHG | OXYGEN SATURATION: 94 % | RESPIRATION RATE: 16 BRPM

## 2022-08-15 DIAGNOSIS — I83.892 VARICOSE VEINS OF LEFT LOWER EXTREMITY WITH OTHER COMPLICATIONS: ICD-10-CM

## 2022-08-15 PROCEDURE — 36475 ENDOVENOUS RF 1ST VEIN: CPT | Mod: LT | Performed by: INTERNAL MEDICINE

## 2022-08-15 NOTE — PROGRESS NOTES
Pt stable throughout procedure. Post procedure instructions reviewed with patient. Pt verbalized understanding. No further questions or concerns. Pt has team 4 direct number to call with any questions or concerns.

## 2022-08-17 ENCOUNTER — ANCILLARY PROCEDURE (OUTPATIENT)
Dept: VASCULAR ULTRASOUND | Facility: CLINIC | Age: 52
End: 2022-08-17
Attending: INTERNAL MEDICINE
Payer: COMMERCIAL

## 2022-08-17 DIAGNOSIS — I83.892 VARICOSE VEINS OF LEFT LOWER EXTREMITY WITH OTHER COMPLICATIONS: ICD-10-CM

## 2022-08-17 PROCEDURE — 93971 EXTREMITY STUDY: CPT | Mod: LT | Performed by: INTERNAL MEDICINE

## 2022-08-26 ENCOUNTER — TELEPHONE (OUTPATIENT)
Dept: CARDIOLOGY | Facility: CLINIC | Age: 52
End: 2022-08-26

## 2022-08-30 DIAGNOSIS — Z95.1 S/P CABG (CORONARY ARTERY BYPASS GRAFT): ICD-10-CM

## 2022-08-30 RX ORDER — ROSUVASTATIN CALCIUM 40 MG/1
40 TABLET, COATED ORAL DAILY
Qty: 90 TABLET | Refills: 3 | Status: SHIPPED | OUTPATIENT
Start: 2022-08-30 | End: 2022-11-07

## 2022-08-30 NOTE — TELEPHONE ENCOUNTER
Received refill request for:  Rosuvastatin  Last OV was: 22 Dr. Eric   Labs/EK22 Lipids  F/U scheduled:   Date Time Provider Department Center   2022  9:45 AM RU LAB [facility]   2022 10:45 AM Jean Méndez MD [facility]     Pharmacy: [facility] Cardiology Refill Guideline reviewed.  Medication meets criteria for refill.    Lara Irizarry RN, BSN  22 at 12:02 PM

## 2022-10-16 ENCOUNTER — HEALTH MAINTENANCE LETTER (OUTPATIENT)
Age: 52
End: 2022-10-16

## 2022-11-07 ENCOUNTER — OFFICE VISIT (OUTPATIENT)
Dept: CARDIOLOGY | Facility: CLINIC | Age: 52
End: 2022-11-07
Attending: PHYSICIAN ASSISTANT
Payer: COMMERCIAL

## 2022-11-07 VITALS
BODY MASS INDEX: 34.48 KG/M2 | HEART RATE: 64 BPM | HEIGHT: 72 IN | OXYGEN SATURATION: 98 % | WEIGHT: 254.6 LBS | SYSTOLIC BLOOD PRESSURE: 128 MMHG | DIASTOLIC BLOOD PRESSURE: 80 MMHG

## 2022-11-07 DIAGNOSIS — I10 BENIGN ESSENTIAL HYPERTENSION: Primary | ICD-10-CM

## 2022-11-07 DIAGNOSIS — Z95.1 S/P CABG (CORONARY ARTERY BYPASS GRAFT): ICD-10-CM

## 2022-11-07 DIAGNOSIS — I35.1 NONRHEUMATIC AORTIC VALVE INSUFFICIENCY: ICD-10-CM

## 2022-11-07 DIAGNOSIS — H66.90 EAR INFECTION: ICD-10-CM

## 2022-11-07 PROCEDURE — 99214 OFFICE O/P EST MOD 30 MIN: CPT | Performed by: INTERNAL MEDICINE

## 2022-11-07 RX ORDER — CIPROFLOXACIN/HYDROCORTISONE 0.2 %-1 %
3 SUSPENSION, DROPS(FINAL DOSAGE FORM)(ML) OTIC (EAR) 2 TIMES DAILY PRN
Qty: 10 ML | Refills: 3 | Status: SHIPPED | OUTPATIENT
Start: 2022-11-07

## 2022-11-07 RX ORDER — ASPIRIN 325 MG
325 TABLET, DELAYED RELEASE (ENTERIC COATED) ORAL DAILY
Qty: 90 TABLET | Refills: 3 | Status: SHIPPED | OUTPATIENT
Start: 2022-11-07

## 2022-11-07 RX ORDER — AMLODIPINE BESYLATE 5 MG/1
5 TABLET ORAL 2 TIMES DAILY
Qty: 180 TABLET | Refills: 3 | Status: SHIPPED | OUTPATIENT
Start: 2022-11-07 | End: 2023-12-18

## 2022-11-07 RX ORDER — ROSUVASTATIN CALCIUM 40 MG/1
40 TABLET, COATED ORAL DAILY
Qty: 90 TABLET | Refills: 3 | Status: SHIPPED | OUTPATIENT
Start: 2022-11-07 | End: 2023-12-20

## 2022-11-07 RX ORDER — METOPROLOL SUCCINATE 50 MG/1
75 TABLET, EXTENDED RELEASE ORAL DAILY
Qty: 135 TABLET | Refills: 3 | Status: SHIPPED | OUTPATIENT
Start: 2022-11-07 | End: 2024-02-15

## 2022-11-07 NOTE — PATIENT INSTRUCTIONS
November 7, 2022    Thank you for allowing our Cardiology team to participate in your care.     Please note the following changes to your heart treatment plan:     Medication changes:   - increase amlodipine 5mg daily to 5mg twice daily     Tests to be done:  - TTE in 1 year    Follow up:  - Follow up in 1 year with Daljit MEDINA, or sooner as needed.      For scheduling, please call [phone].    Please contact our team at [phone] (Lynette VARELA) or [phone] for any questions or concerns.     If you are having a medical emergency, please call [phone].     Sincerely,    Jean Méndez MD, [facility]  Cardiology    [facility] - [facility] - [facility] - [place]

## 2022-11-07 NOTE — PROGRESS NOTES
Cardiology Clinic Progress Note:    November 7, 2022   Patient Name: Mor Monson  Patient MRN: [id number]     Consult indication: CAD s/p CABG    HPI:    I had the opportunity to see patient Mor Monson in cardiology clinic for a follow up visit. Patient is followed by our colleague Asad Goodwin MD with Primary Care.     As you know, Mr. Monson is a pleasant 51-year-old male with a past medical history significant for coronary artery disease, status post CABG (left radial artery graft to OM, LIMA to LAD 01/09/2021 with Dr. Rojas), mildly dilated aortic root and ascending aorta, who presents for followup.      I had initially met Mr. Monson at [facility] when he presented for chest discomfort.  He works as a  and so is very physically active on a regular basis.  In the preceding months, he had noted left-sided chest discomfort as well as dyspnea on exertion.  In the emergency department, he was noted to be significantly hypertensive at 219/121 mmHg.  ECG demonstrated borderline LVH changes, but otherwise did not demonstrate any abnormalities, including acute ischemic abnormalities.  Troponins were normal.  TTE was done 01/08/2021 that was personally reviewed and demonstrated a mildly reduced LV function with anterior and anterolateral wall hypokinesis.  The patient underwent cardiac catheterization 01/08/2021 with Dr. Singh that demonstrated a severe ulcerative lesion in the distal left main extending into the ostial LAD and left circumflex as well as moderate disease in the RCA.  Ultimately, the patient was transferred to [facility] where he underwent coronary artery bypass surgery with Dr. Rojas 01/09/2021 with a left radial artery graft to the left circumflex OM and LIMA to LAD grafting.      Patient has since been seen by my colleagues Daljit MEDINA and Dr. Eric for varicose vein ablation.    Overall patient reports that he has been  doing well.  He continues to stay physically active, he works a physically demanding job, denies any chest pain, chest pressure, abnormal shortness of breath.  Blood pressure was mildly elevated today 128/80 mmHg, which is similar to the readings he is getting at home.  He does note mild swelling in his right cheek, larger approximately 1 week ago, and has since decreased in size, denies any warmth, tenderness, drainage.  He denies any swelling/pain in his gums/teeth.    Assessment and Plan/Recommendations:    1.  Coronary artery disease, presented with unstable angina 01/07/2021, status post cardiac catheterization 01/08/2021 demonstrating multivessel obstructive coronary artery disease, status post coronary artery bypass surgery with Dr. Rojas 01/09/2021.  Left radial artery graft to left circumflex and OM, LIMA to LAD.  Residual nonobstructive disease in the RCA.  Denies any symptoms concerning for angina or decompensated heart failure.   2.  Postoperative SVT versus atrial fibrillation, postop day 2, per chart review resolved with metoprolol, no evidence of recurrence since then.   4.  Hypertension.   BP mildly elevated.   5.  Hyperlipidemia.  LDL at goal <70.  6.  Right cheek mass, suspect small furuncle.   7.  Left GSV RF ablation and phlebectomy of Left varicose veins with Dr. Eric 8/15/2022    -Discussed mass in right cheek, suspect small furuncle.  Since it is improving spontaneously, advised continued monitoring, however he should follow-up with his PCP/Dermatology team if symptoms persist, or if he develops any worsening pain, swelling, increase in size, induration/warmth, drainage, fever/chills.  - From a cardiac perspective, patient is stable without symptoms concerning for angina or decompensated heart failure.  Blood pressure is mildly elevated, will increase amlodipine to 5 mg twice daily  - Continue aspirin, rosuvastatin, metoprolol succinate  - Refilled his eardrops per request  - Follow-up in  1 year with Daljit MEDINA with TTE, if stable I can see him 2 years later, or sooner as needed    Thank you for allowing our team to participate in the care of Mor Monson.  Please do not hesitate to call or page me with any questions or concerns.    Sincerely,     Jean Méndez MD, [facility]  Cardiology  Text Page   November 7, 2022    cc  Daljit Rodas PA-C  [address] [place] [address]    Voice recognition software utilized.     Total time spent on this encounter: 30 minutes, providing care in this encounter including, but not limited to, reviewing prior medical records, laboratory data, imaging studies, diagnostic studies, procedure notes, formulating an assessment and plan, recommendations, discussion and counseling with patient face to face, dictation.    Past Medical History:   The 10-year ASCVD risk score (Selene DK, et al., 2019) is: 3%    Values used to calculate the score:      Age: 51 years      Sex: Male      Is Non- : No      Diabetic: No      Tobacco smoker: No      Systolic Blood Pressure: 128 mmHg      Is BP treated: No      HDL Cholesterol: 51 mg/dL      Total Cholesterol: 144 mg/dL  Past Medical History:   Diagnosis Date     Hernia, abdominal      Palpitations         Past Surgical History:   Past Surgical History:   Procedure Laterality Date     BYPASS GRAFT ARTERY CORONARY N/A 1/9/2021    Procedure: CORONARY ARTERY BYPASS GRAFT (CABG), On pump, Fermin, Radial vein graft LIMA-LAD Radial artery graft-OM;  Surgeon: Jacky Rojas MD;  Location:  OR     CV CORONARY ANGIOGRAM N/A 1/8/2021    Procedure: Coronary Angiogram;  Surgeon: Gabino Quevedo MD;  Location:  HEART CARDIAC CATH LAB     CV INTRA AORTIC BALLOON N/A 1/9/2021    Procedure: Intra-Aortic Balloon;  Surgeon: Mor Singh MD;  Location:  HEART CARDIAC CATH LAB     CV LEFT HEART CATH N/A 1/8/2021    Procedure: Left Heart Cath;  Surgeon: Gabino Quevedo MD;   Location:  HEART CARDIAC CATH LAB     HERNIA REPAIR         Medications (outpatient):  Current Outpatient Medications   Medication Sig Dispense Refill     amLODIPine (NORVASC) 5 MG tablet Take 1 tablet (5 mg) by mouth 2 times daily 180 tablet 3     aspirin (ASA) 325 MG EC tablet Take 1 tablet (325 mg) by mouth daily 90 tablet 3     ciprofloxacin-hydrocortisone (CIPRO HC) 0.2-1 % otic suspension Place 3 drops into both ears 2 times daily as needed (after swimming as needed for ear infections) 10 mL 3     diazepam (VALIUM) 5 MG tablet Please bring both tablets with you to upcoming vein procedure. 2 tablet 0     doxycycline hyclate (VIBRA-TABS) 100 MG tablet Take 100 mg by mouth 2 times daily as needed (eye infection)       HEMP OIL OR EXTRACT OR OTHER CBD CANNABINOID, NOT MEDICAL CANNABIS, At Bedtime       metoprolol succinate ER (TOPROL XL) 50 MG 24 hr tablet Take 1.5 tablets (75 mg) by mouth daily 135 tablet 3     Multiple Vitamins-Minerals (MULTIVITAMIN ADULTS PO) Take by mouth daily       rosuvastatin (CRESTOR) 40 MG tablet Take 1 tablet (40 mg) by mouth daily 90 tablet 3       Allergies:  No Known Allergies    Social History:   History   Drug Use Not on file      History   Smoking Status     Never   Smokeless Tobacco     Never     Social History    Substance and Sexual Activity      Alcohol use: Yes        Comment: on weekends       Family History:  No family history on file.    Review of Systems:   A complete review of systems was negative except as mentioned in the History of Present Illness.     Objective & Physical Exam:  /80 (BP Location: Right arm, Patient Position: Sitting, Cuff Size: Adult Large)   Pulse 64   Ht 1.829 m (6')   Wt 115.5 kg (254 lb 9.6 oz)   SpO2 98%   BMI 34.53 kg/m    Wt Readings from Last 2 Encounters:   11/07/22 115.5 kg (254 lb 9.6 oz)   02/10/22 113.4 kg (250 lb)     Body mass index is 34.53 kg/m .   Body surface area is 2.42 meters squared.    Constitutional: appears  stated age, in no apparent distress, appears to be well nourished  Head: normocephalic, atraumatic  Neck: supple, trachea midline, no bruit bilaterally   Pulmonary: clear to auscultation bilaterally, no wheezes, no rales, no increased work of breathing  Cardiovascular: JVP normal, regular rate, regular rhythm, normal S1 and S2, no S3, S4, no murmur appreciated, no lower extremity edema  Gastrointestinal: no guarding, non-rigid   Neurologic: awake, alert, moves all extremities  Skin: no jaundice, warm on limited exam  Psychiatric: affect is normal, answers questions appropriately, oriented to self and place    Data reviewed:  Lab Results   Component Value Date    WBC 9.1 02/18/2021    RBC 5.61 02/18/2021    HGB 16.1 02/18/2021    HCT 50.0 02/18/2021    MCV 89 02/18/2021    MCH 28.7 02/18/2021    MCHC 32.2 02/18/2021    RDW 12.0 02/18/2021     02/18/2021     Sodium   Date Value Ref Range Status   02/04/2022 135 133 - 144 mmol/L Final   02/18/2021 140 133 - 144 mmol/L Final     Potassium   Date Value Ref Range Status   02/04/2022 4.7 3.4 - 5.3 mmol/L Final     Comment:     Specimen slightly hemolyzed, potassium may be falsely elevated.   02/18/2021 4.2 3.4 - 5.3 mmol/L Final     Chloride   Date Value Ref Range Status   02/04/2022 108 94 - 109 mmol/L Final   02/18/2021 108 94 - 109 mmol/L Final     Carbon Dioxide   Date Value Ref Range Status   02/18/2021 24 20 - 32 mmol/L Final     Carbon Dioxide (CO2)   Date Value Ref Range Status   02/04/2022 24 20 - 32 mmol/L Final     Anion Gap   Date Value Ref Range Status   02/04/2022 3 3 - 14 mmol/L Final   02/18/2021 8 3 - 14 mmol/L Final     Glucose   Date Value Ref Range Status   02/04/2022 166 (H) 70 - 99 mg/dL Final   02/18/2021 137 (H) 70 - 99 mg/dL Final     Urea Nitrogen   Date Value Ref Range Status   02/04/2022 18 7 - 30 mg/dL Final   02/18/2021 14 7 - 30 mg/dL Final     Creatinine   Date Value Ref Range Status   02/04/2022 0.98 0.66 - 1.25 mg/dL Final    02/18/2021 0.87 0.66 - 1.25 mg/dL Final     GFR Estimate   Date Value Ref Range Status   02/04/2022 >90 >60 mL/min/1.73m2 Final     Comment:     Effective December 21, 2021 eGFRcr in adults is calculated using the 2021 CKD-EPI creatinine equation which includes age and gender (Patricia azar al., [place], DOI: 10.1056/WRKUpa8567246)   02/18/2021 >90 >60 mL/min/[1.73_m2] Final     Comment:     Non  GFR Calc  Starting 12/18/2018, serum creatinine based estimated GFR (eGFR) will be   calculated using the Chronic Kidney Disease Epidemiology Collaboration   (CKD-EPI) equation.       Calcium   Date Value Ref Range Status   02/04/2022 9.6 8.5 - 10.1 mg/dL Final   02/18/2021 9.9 8.5 - 10.1 mg/dL Final     Bilirubin Total   Date Value Ref Range Status   01/10/2021 0.9 0.2 - 1.3 mg/dL Final     Alkaline Phosphatase   Date Value Ref Range Status   01/10/2021 42 40 - 150 U/L Final     ALT   Date Value Ref Range Status   02/01/2022 54 0 - 70 U/L Final   01/10/2021 25 0 - 70 U/L Final     AST   Date Value Ref Range Status   01/10/2021 38 0 - 45 U/L Final     Recent Labs   Lab Test 08/12/22  0922 02/01/22  0854   CHOL 144 155   HDL 51 40   LDL 67 84   TRIG 130 154*      Lab Results   Component Value Date    A1C 5.5 01/07/2021

## 2022-11-07 NOTE — LETTER
11/7/2022    Asad Goodwin  [address] [facility] [address]    RE: Mor Monson       Dear Colleague,     I had the pleasure of seeing Mor Monson in the [facility].      Cardiology Clinic Progress Note:    November 7, 2022   Patient Name: Mor Monson  Patient MRN: [id number]     Consult indication: CAD s/p CABG    HPI:    I had the opportunity to see patient Mor Monson in cardiology clinic for a follow up visit. Patient is followed by our colleague Asad Goodwin MD with Primary Care.     As you know, Mr. Monson is a pleasant 51-year-old male with a past medical history significant for coronary artery disease, status post CABG (left radial artery graft to OM, LIMA to LAD 01/09/2021 with Dr. Rojas), mildly dilated aortic root and ascending aorta, who presents for followup.      I had initially met Mr. Monson at [facility] when he presented for chest discomfort.  He works as a  and so is very physically active on a regular basis.  In the preceding months, he had noted left-sided chest discomfort as well as dyspnea on exertion.  In the emergency department, he was noted to be significantly hypertensive at 219/121 mmHg.  ECG demonstrated borderline LVH changes, but otherwise did not demonstrate any abnormalities, including acute ischemic abnormalities.  Troponins were normal.  TTE was done 01/08/2021 that was personally reviewed and demonstrated a mildly reduced LV function with anterior and anterolateral wall hypokinesis.  The patient underwent cardiac catheterization 01/08/2021 with Dr. Singh that demonstrated a severe ulcerative lesion in the distal left main extending into the ostial LAD and left circumflex as well as moderate disease in the RCA.  Ultimately, the patient was transferred to [facility] where he underwent coronary artery bypass surgery with Dr. Rojas 01/09/2021 with a left radial artery graft to  the left circumflex OM and LIMA to LAD grafting.      Patient has since been seen by my colleagues Daljit MEDINA and Dr. Eric for varicose vein ablation.    Overall patient reports that he has been doing well.  He continues to stay physically active, he works a physically demanding job, denies any chest pain, chest pressure, abnormal shortness of breath.  Blood pressure was mildly elevated today 128/80 mmHg, which is similar to the readings he is getting at home.  He does note mild swelling in his right cheek, larger approximately 1 week ago, and has since decreased in size, denies any warmth, tenderness, drainage.  He denies any swelling/pain in his gums/teeth.    Assessment and Plan/Recommendations:    1.  Coronary artery disease, presented with unstable angina 01/07/2021, status post cardiac catheterization 01/08/2021 demonstrating multivessel obstructive coronary artery disease, status post coronary artery bypass surgery with Dr. Rojas 01/09/2021.  Left radial artery graft to left circumflex and OM, LIMA to LAD.  Residual nonobstructive disease in the RCA.  Denies any symptoms concerning for angina or decompensated heart failure.   2.  Postoperative SVT versus atrial fibrillation, postop day 2, per chart review resolved with metoprolol, no evidence of recurrence since then.   4.  Hypertension.   BP mildly elevated.   5.  Hyperlipidemia.  LDL at goal <70.  6.  Right cheek mass, suspect small furuncle.   7.  Left GSV RF ablation and phlebectomy of Left varicose veins with Dr. Eric 8/15/2022    -Discussed mass in right cheek, suspect small furuncle.  Since it is improving spontaneously, advised continued monitoring, however he should follow-up with his PCP/Dermatology team if symptoms persist, or if he develops any worsening pain, swelling, increase in size, induration/warmth, drainage, fever/chills.  - From a cardiac perspective, patient is stable without symptoms concerning for angina or decompensated heart  failure.  Blood pressure is mildly elevated, will increase amlodipine to 5 mg twice daily  - Continue aspirin, rosuvastatin, metoprolol succinate  - Refilled his eardrops per request  - Follow-up in 1 year with Daljit MEDINA with TTE, if stable I can see him 2 years later, or sooner as needed    Thank you for allowing our team to participate in the care of Mor Monson.  Please do not hesitate to call or page me with any questions or concerns.    Sincerely,     Jean Méndez MD, [facility]  Cardiology  Text Page   November 7, 2022    cc  Daljit Rodas PA-C  [address] [place] [address]    Voice recognition software utilized.     Total time spent on this encounter: 30 minutes, providing care in this encounter including, but not limited to, reviewing prior medical records, laboratory data, imaging studies, diagnostic studies, procedure notes, formulating an assessment and plan, recommendations, discussion and counseling with patient face to face, dictation.    Past Medical History:   The 10-year ASCVD risk score (Selene DK, et al., 2019) is: 3%    Values used to calculate the score:      Age: 51 years      Sex: Male      Is Non- : No      Diabetic: No      Tobacco smoker: No      Systolic Blood Pressure: 128 mmHg      Is BP treated: No      HDL Cholesterol: 51 mg/dL      Total Cholesterol: 144 mg/dL  Past Medical History:   Diagnosis Date     Hernia, abdominal      Palpitations         Past Surgical History:   Past Surgical History:   Procedure Laterality Date     BYPASS GRAFT ARTERY CORONARY N/A 1/9/2021    Procedure: CORONARY ARTERY BYPASS GRAFT (CABG), On pump, Fermin, Radial vein graft LIMA-LAD Radial artery graft-OM;  Surgeon: Jacky Rojas MD;  Location: [facility] OR     CV CORONARY ANGIOGRAM N/A 1/8/2021    Procedure: Coronary Angiogram;  Surgeon: Gabino Quevedo MD;  Location:  HEART CARDIAC CATH LAB     CV INTRA AORTIC BALLOON N/A 1/9/2021     Procedure: Intra-Aortic Balloon;  Surgeon: Mor Singh MD;  Location:  HEART CARDIAC CATH LAB     CV LEFT HEART CATH N/A 1/8/2021    Procedure: Left Heart Cath;  Surgeon: Gabino Quevedo MD;  Location:  HEART CARDIAC CATH LAB     HERNIA REPAIR         Medications (outpatient):  Current Outpatient Medications   Medication Sig Dispense Refill     amLODIPine (NORVASC) 5 MG tablet Take 1 tablet (5 mg) by mouth 2 times daily 180 tablet 3     aspirin (ASA) 325 MG EC tablet Take 1 tablet (325 mg) by mouth daily 90 tablet 3     ciprofloxacin-hydrocortisone (CIPRO HC) 0.2-1 % otic suspension Place 3 drops into both ears 2 times daily as needed (after swimming as needed for ear infections) 10 mL 3     diazepam (VALIUM) 5 MG tablet Please bring both tablets with you to upcoming vein procedure. 2 tablet 0     doxycycline hyclate (VIBRA-TABS) 100 MG tablet Take 100 mg by mouth 2 times daily as needed (eye infection)       HEMP OIL OR EXTRACT OR OTHER CBD CANNABINOID, NOT MEDICAL CANNABIS, At Bedtime       metoprolol succinate ER (TOPROL XL) 50 MG 24 hr tablet Take 1.5 tablets (75 mg) by mouth daily 135 tablet 3     Multiple Vitamins-Minerals (MULTIVITAMIN ADULTS PO) Take by mouth daily       rosuvastatin (CRESTOR) 40 MG tablet Take 1 tablet (40 mg) by mouth daily 90 tablet 3       Allergies:  No Known Allergies    Social History:   History   Drug Use Not on file      History   Smoking Status     Never   Smokeless Tobacco     Never     Social History    Substance and Sexual Activity      Alcohol use: Yes        Comment: on weekends       Family History:  No family history on file.    Review of Systems:   A complete review of systems was negative except as mentioned in the History of Present Illness.     Objective & Physical Exam:  /80 (BP Location: Right arm, Patient Position: Sitting, Cuff Size: Adult Large)   Pulse 64   Ht 1.829 m (6')   Wt 115.5 kg (254 lb 9.6 oz)   SpO2 98%   BMI 34.53 kg/m    Wt  Readings from Last 2 Encounters:   11/07/22 115.5 kg (254 lb 9.6 oz)   02/10/22 113.4 kg (250 lb)     Body mass index is 34.53 kg/m .   Body surface area is 2.42 meters squared.    Constitutional: appears stated age, in no apparent distress, appears to be well nourished  Head: normocephalic, atraumatic  Neck: supple, trachea midline, no bruit bilaterally   Pulmonary: clear to auscultation bilaterally, no wheezes, no rales, no increased work of breathing  Cardiovascular: JVP normal, regular rate, regular rhythm, normal S1 and S2, no S3, S4, no murmur appreciated, no lower extremity edema  Gastrointestinal: no guarding, non-rigid   Neurologic: awake, alert, moves all extremities  Skin: no jaundice, warm on limited exam  Psychiatric: affect is normal, answers questions appropriately, oriented to self and place    Data reviewed:  Lab Results   Component Value Date    WBC 9.1 02/18/2021    RBC 5.61 02/18/2021    HGB 16.1 02/18/2021    HCT 50.0 02/18/2021    MCV 89 02/18/2021    MCH 28.7 02/18/2021    MCHC 32.2 02/18/2021    RDW 12.0 02/18/2021     02/18/2021     Sodium   Date Value Ref Range Status   02/04/2022 135 133 - 144 mmol/L Final   02/18/2021 140 133 - 144 mmol/L Final     Potassium   Date Value Ref Range Status   02/04/2022 4.7 3.4 - 5.3 mmol/L Final     Comment:     Specimen slightly hemolyzed, potassium may be falsely elevated.   02/18/2021 4.2 3.4 - 5.3 mmol/L Final     Chloride   Date Value Ref Range Status   02/04/2022 108 94 - 109 mmol/L Final   02/18/2021 108 94 - 109 mmol/L Final     Carbon Dioxide   Date Value Ref Range Status   02/18/2021 24 20 - 32 mmol/L Final     Carbon Dioxide (CO2)   Date Value Ref Range Status   02/04/2022 24 20 - 32 mmol/L Final     Anion Gap   Date Value Ref Range Status   02/04/2022 3 3 - 14 mmol/L Final   02/18/2021 8 3 - 14 mmol/L Final     Glucose   Date Value Ref Range Status   02/04/2022 166 (H) 70 - 99 mg/dL Final   02/18/2021 137 (H) 70 - 99 mg/dL Final     Urea  Nitrogen   Date Value Ref Range Status   02/04/2022 18 7 - 30 mg/dL Final   02/18/2021 14 7 - 30 mg/dL Final     Creatinine   Date Value Ref Range Status   02/04/2022 0.98 0.66 - 1.25 mg/dL Final   02/18/2021 0.87 0.66 - 1.25 mg/dL Final     GFR Estimate   Date Value Ref Range Status   02/04/2022 >90 >60 mL/min/1.73m2 Final     Comment:     Effective December 21, 2021 eGFRcr in adults is calculated using the 2021 CKD-EPI creatinine equation which includes age and gender (Patricia et al., NEJ, DOI: 10.1056/GIXApd9446979)   02/18/2021 >90 >60 mL/min/[1.73_m2] Final     Comment:     Non  GFR Calc  Starting 12/18/2018, serum creatinine based estimated GFR (eGFR) will be   calculated using the Chronic Kidney Disease Epidemiology Collaboration   (CKD-EPI) equation.       Calcium   Date Value Ref Range Status   02/04/2022 9.6 8.5 - 10.1 mg/dL Final   02/18/2021 9.9 8.5 - 10.1 mg/dL Final     Bilirubin Total   Date Value Ref Range Status   01/10/2021 0.9 0.2 - 1.3 mg/dL Final     Alkaline Phosphatase   Date Value Ref Range Status   01/10/2021 42 40 - 150 U/L Final     ALT   Date Value Ref Range Status   02/01/2022 54 0 - 70 U/L Final   01/10/2021 25 0 - 70 U/L Final     AST   Date Value Ref Range Status   01/10/2021 38 0 - 45 U/L Final     Recent Labs   Lab Test 08/12/22  0922 02/01/22  0854   CHOL 144 155   HDL 51 40   LDL 67 84   TRIG 130 154*      Lab Results   Component Value Date    A1C 5.5 01/07/2021      Thank you for allowing me to participate in the care of your patient.      Sincerely,     Jean Méndez MD     [facility]  cc:   Daljit Rodas, PAKandiC  [address] [place] [address]

## 2022-12-03 ENCOUNTER — HEALTH MAINTENANCE LETTER (OUTPATIENT)
Age: 52
End: 2022-12-03

## 2023-03-24 NOTE — DISCHARGE SUMMARY
Discharge Summary    Mor Monson MRN# [id number]   YOB: 1970 Age: 50 year old     Date of Admission:  1/8/2021  Date of Discharge:  1/14/2021 11:55 AM  Admitting Physician:  Dr. Jacky Rojas  Discharge Physician:  Nemo Oconnell PA-C with Dr. Arellano  Discharging Service:  Cardiovascular and Thoracic Surgery     Home clinic: [facility]  Primary Provider: Asad Goodiwn          Admission Diagnoses:   Chest Pain  Coronary artery disease          Discharge Diagnosis:   Patient Active Problem List   Diagnosis     Unstable angina (H)     Coronary artery disease     Coronary artery disease involving native coronary artery of native heart with unstable angina pectoris (H)     S/P CABG (coronary artery bypass graft)     Fluid overload     Transient hyperglycemia post procedure                Discharge Disposition:   Discharged to home           Condition on Discharge:   Discharge condition: Stable   Discharge vitals: Blood pressure 120/75, pulse 87, temperature 97.9  F (36.6  C), temperature source Oral, resp. rate 16, weight 116.5 kg (256 lb 13.4 oz), SpO2 95 %.     Code status on discharge: Full Code           Procedures:   On 1/9/21 Mr Monson successfully underwent Coronary artery bypass grafting x 2 (radial artery graft to the obtuse marginal branch of the left circumflex coronary artery and pedicled left internal mammary artery to left anterior descending coronary artery). Endoscopic left radial artery harvest. by Dr. Rojas.          Medications Prior to Admission:     Medications Prior to Admission   Medication Sig Dispense Refill Last Dose     ciprofloxacin-dexamethasone (CIPRODEX) 0.3-0.1 % otic suspension Place 4 drops into both ears 2 times daily as needed   More than a month at Unknown time     doxycycline hyclate (VIBRA-TABS) 100 MG tablet Take 100 mg by mouth 2 times daily as needed (eye infection)   More than a month at Unknown time     [DISCONTINUED] ibuprofen  (ADVIL/MOTRIN) 200 MG tablet Take 200-600 mg by mouth every 6 hours as needed for mild pain   More than a month at Unknown time             Discharge Medications:     Current Discharge Medication List      START taking these medications    Details   acetaminophen (TYLENOL) 325 MG tablet Take 2 tablets (650 mg) by mouth every 4 hours as needed for mild pain  Qty: 40 tablet, Refills: 0    Associated Diagnoses: S/P CABG (coronary artery bypass graft)      amLODIPine (NORVASC) 5 MG tablet Take 1.5 tablets (7.5 mg) by mouth daily  Qty: 30 tablet, Refills: 3    Associated Diagnoses: S/P CABG (coronary artery bypass graft)      aspirin (ASA) 325 MG EC tablet Take 1 tablet (325 mg) by mouth daily  Qty: 30 tablet, Refills: 0    Associated Diagnoses: S/P CABG (coronary artery bypass graft)      methocarbamol (ROBAXIN) 750 MG tablet Take 1 tablet (750 mg) by mouth 4 times daily as needed for muscle spasms  Qty: 40 tablet, Refills: 0    Associated Diagnoses: S/P CABG (coronary artery bypass graft)      metoprolol tartrate (LOPRESSOR) 50 MG tablet Take 1 tablet (50 mg) by mouth 2 times daily  Qty: 60 tablet, Refills: 0    Associated Diagnoses: S/P CABG (coronary artery bypass graft)      oxyCODONE (ROXICODONE) 5 MG tablet Take 1-2 tablets (5-10 mg) by mouth every 3 hours as needed for moderate to severe pain  Qty: 50 tablet, Refills: 0    Associated Diagnoses: S/P CABG (coronary artery bypass graft)      pantoprazole (PROTONIX) 40 MG EC tablet Take 1 tablet (40 mg) by mouth every morning (before breakfast) for 14 days  Qty: 14 tablet, Refills: 0    Associated Diagnoses: S/P CABG (coronary artery bypass graft)      polyethylene glycol (MIRALAX) 17 GM/Dose powder Take 17 g by mouth daily  Qty: 510 g, Refills: 0    Associated Diagnoses: S/P CABG (coronary artery bypass graft)      rosuvastatin (CRESTOR) 20 MG tablet Take 1 tablet (20 mg) by mouth daily  Qty: 30 tablet, Refills: 3    Associated Diagnoses: S/P CABG (coronary artery  bypass graft)      senna-docusate (SENOKOT-S/PERICOLACE) 8.6-50 MG tablet Take 2 tablets by mouth 2 times daily as needed for constipation  Qty: 30 tablet, Refills: 0    Associated Diagnoses: S/P CABG (coronary artery bypass graft)         CONTINUE these medications which have NOT CHANGED    Details   ciprofloxacin-dexamethasone (CIPRODEX) 0.3-0.1 % otic suspension Place 4 drops into both ears 2 times daily as needed      doxycycline hyclate (VIBRA-TABS) 100 MG tablet Take 100 mg by mouth 2 times daily as needed (eye infection)         STOP taking these medications       ibuprofen (ADVIL/MOTRIN) 200 MG tablet Comments:   Reason for Stopping:                     Consultations:   Nutrition, Intensivist, Cardiology             Brief History of Illness:   Mr. Monson is a 50-year-old man who presents with chest pain.  Coronary angiography demonstrates severe distal left main coronary artery disease. Echocardiography demonstrates overall preserved left ventricular function (although there is anterior hypokinesis) and no significant valvular abnormality. The patient understands the risks and benefits of the procedure and wishes to undergo the operation.          Hospital Course:   On 1/9/21 Mr Monson successfully underwent Coronary artery bypass grafting x 2 (radial artery graft to the obtuse marginal branch of the left circumflex coronary artery and pedicled left internal mammary artery to left anterior descending coronary artery). Endoscopic left radial artery harvest. by Dr. Rojas.  Was extubated within 24 hours of surgery. One he was weaned off hemodynamic stabilizing gtt's, transferred to the surgical floor.   Had some transient hyperglycemia treated with an insulin gtt, transitioned to sliding scale insulin and has no need at discharge.  Had some fluid overload treated with diuretic medication. At discharge he is at his pre-op weight and is not sent with any diuretics.   Completed 4 days of unasyn for potential  aspirational pneumonia.   Heart rhythm remained stable. Blood pressure meds were uptitrated along with BB. He progressed well with cardiac rehab. They are discharged home on pod# 5 with the help of their family.              Significant Results:   Lab Results   Component Value Date    WBC 10.9 01/14/2021     Lab Results   Component Value Date    RBC 4.30 01/14/2021     Lab Results   Component Value Date    HGB 12.8 01/14/2021     Lab Results   Component Value Date    HCT 37.7 01/14/2021     No components found for: MCT  Lab Results   Component Value Date    MCV 88 01/14/2021     Lab Results   Component Value Date    MCH 29.8 01/14/2021     Lab Results   Component Value Date    MCHC 34.0 01/14/2021     Lab Results   Component Value Date    RDW 12.5 01/14/2021     Lab Results   Component Value Date     01/14/2021       Last Basic Metabolic Panel:  Lab Results   Component Value Date     01/14/2021      Lab Results   Component Value Date    POTASSIUM 3.8 01/14/2021     Lab Results   Component Value Date    CHLORIDE 105 01/14/2021     Lab Results   Component Value Date    JOSE ALBERTO 9.1 01/14/2021     Lab Results   Component Value Date    CO2 22 01/14/2021     Lab Results   Component Value Date    BUN 16 01/14/2021     Lab Results   Component Value Date    CR 0.57 01/14/2021     Lab Results   Component Value Date     01/14/2021                  Pending Results:   None           Discharge Instructions and Follow-Up:   Discharge diet: Regular   Discharge activity: Daily weights: Call if weight gain 2-3 lbs over 24 hours or if greater than 5 lbs in 1 week.  No lifting more than 10 lbs for 8-12 weeks.  No driving for 1 month.  Call for pain medication refill.  Call for temperature greater than 101.0  Daily shower with antibacterial soap.   Discharge follow-up: *Follow up primary care provider in 7-10 days after discharge in order to review your medication, vital signs, obtain any necessary lab work your doctor  may want, and to update them on your hospitalization and medical issues.   *Follow up with Nemo/Edna/Rajat Jenkins with Dr Arellano, heart surgeon, at [facility] at [facility] [address] on 1/25/21 at 2:00 PM via virtual/phone visit. If any questions or concerns call [phone].  You will see us once at this visit and then if everything is going well you will not need to see us again.  You will follow long term with your cardiologist.   *Follow up with Dr Méndez, cardiologist, on 3/29/21 at 0945 am at [facility]. This is who you will follow with long term about your heart issues. [phone].   *Please schedule outpatient cardiac rehab within 5 days of discharge from TCU, call [phone].    Outpatient therapy: Cardiac rehab   Home Care agency: None    Supplies and equipment: None   Lines and drains: None    Wound care: Wash incision daily with antibacterial soap   Other instructions: None          Yes

## 2023-07-27 ENCOUNTER — DOCUMENTATION ONLY (OUTPATIENT)
Dept: AUDIOLOGY | Facility: CLINIC | Age: 53
End: 2023-07-27
Payer: COMMERCIAL

## 2023-07-27 NOTE — PROGRESS NOTES
6.27.23 Swim Plugs ordered, Notice of Non-Covered Services signed, INV #[id number]    Company: [employer]     Style: 24C Floating Swim Plugs with handles and cord (Handle not done with icon request)   Material:Neon-Lite (Floating Swim Plugs)    Color: [place] orange and white, blue shark icon   Glitter:  N/A  Vent:  N/A  Canal: Medium  Helix:  N/A  Ear(s):  Bilateral

## 2023-11-09 ENCOUNTER — HOSPITAL ENCOUNTER (OUTPATIENT)
Dept: CARDIOLOGY | Facility: CLINIC | Age: 53
Discharge: HOME | End: 2023-11-09
Attending: INTERNAL MEDICINE | Admitting: INTERNAL MEDICINE
Payer: COMMERCIAL

## 2023-11-09 DIAGNOSIS — I35.1 NONRHEUMATIC AORTIC VALVE INSUFFICIENCY: ICD-10-CM

## 2023-11-09 DIAGNOSIS — Z95.1 S/P CABG (CORONARY ARTERY BYPASS GRAFT): ICD-10-CM

## 2023-11-09 LAB — LVEF ECHO: NORMAL

## 2023-11-09 PROCEDURE — 93306 TTE W/DOPPLER COMPLETE: CPT | Mod: 26 | Performed by: INTERNAL MEDICINE

## 2023-11-09 PROCEDURE — 93306 TTE W/DOPPLER COMPLETE: CPT

## 2023-11-16 ENCOUNTER — TELEPHONE (OUTPATIENT)
Dept: CARDIOLOGY | Facility: CLINIC | Age: 53
End: 2023-11-16
Payer: COMMERCIAL

## 2023-11-16 NOTE — RESULT ENCOUNTER NOTE
Results reviewed, please let the patient know that overall findings are reassuring, stable LV function, no significant valvular abnormalities. RV size was noted to be dilated, this is stable from prior studies on personal review, though I agree function is mildly worse on this present study. If he is having symptoms concerning for angina then it would be reasonable to assess with a Lexiscan, otherwise recommend medical management.   Follow up as previously planned, thanks!

## 2023-11-16 NOTE — TELEPHONE ENCOUNTER
Message from Dr. Méndez re: Jean Fowler MD [facility] Heart Team 2  Cc: Judy Aguilar, SHANNAN CNP  Results reviewed, please let the patient know that overall findings are reassuring, stable LV function, no significant valvular abnormalities. RV size was noted to be dilated, this is stable from prior studies on personal review, though I agree function is mildly worse on this present study. If he is having symptoms concerning for angina then it would be reasonable to assess with a Lexiscan, otherwise recommend medical management.  Follow up as previously planned      Patient to see LUCRECIA Yady Aguilar on 12/18/2023.    Attempted to contact patient to review Dr. Méndez's assessment of echo and to check for any new cardiac issues since his visit last year. Left a message for patient to call back.

## 2023-11-17 NOTE — TELEPHONE ENCOUNTER
Spoke with Patient regarding test results.  Patient verbalized understanding.    States he has no symptoms, Denies chest discomfort, shortness of breath, light headedness of dizziness.    Reminded of upcoming LUCRECIA OV in Dec.   Patient agrees with plan.

## 2023-12-11 ENCOUNTER — TELEPHONE (OUTPATIENT)
Dept: CARDIOLOGY | Facility: CLINIC | Age: 53
End: 2023-12-11
Payer: COMMERCIAL

## 2023-12-11 DIAGNOSIS — I25.10 CORONARY ARTERY DISEASE: Primary | ICD-10-CM

## 2023-12-11 NOTE — TELEPHONE ENCOUNTER
Message from chart prep team:  Patient is scheduled with Judy on 12/18. Last labs were 8/22, none placed by PCP either. Please placed orders if needed.     Thanks,   AMARI Maloney     Per protocol: annual lipids are due  Orders updated and message sent to scheduling

## 2023-12-18 ENCOUNTER — OFFICE VISIT (OUTPATIENT)
Dept: CARDIOLOGY | Facility: CLINIC | Age: 53
End: 2023-12-18
Payer: COMMERCIAL

## 2023-12-18 VITALS
HEIGHT: 72 IN | WEIGHT: 258.3 LBS | BODY MASS INDEX: 34.98 KG/M2 | HEART RATE: 64 BPM | OXYGEN SATURATION: 96 % | DIASTOLIC BLOOD PRESSURE: 82 MMHG | SYSTOLIC BLOOD PRESSURE: 132 MMHG

## 2023-12-18 DIAGNOSIS — E78.5 HYPERLIPIDEMIA LDL GOAL <70: ICD-10-CM

## 2023-12-18 DIAGNOSIS — I25.10 CORONARY ARTERY DISEASE INVOLVING NATIVE CORONARY ARTERY OF NATIVE HEART WITHOUT ANGINA PECTORIS: Primary | ICD-10-CM

## 2023-12-18 DIAGNOSIS — I10 BENIGN ESSENTIAL HYPERTENSION: ICD-10-CM

## 2023-12-18 DIAGNOSIS — Z95.1 S/P CABG (CORONARY ARTERY BYPASS GRAFT): ICD-10-CM

## 2023-12-18 PROCEDURE — 99214 OFFICE O/P EST MOD 30 MIN: CPT | Performed by: NURSE PRACTITIONER

## 2023-12-18 RX ORDER — AMLODIPINE BESYLATE 10 MG/1
10 TABLET ORAL 2 TIMES DAILY
Qty: 90 TABLET | Refills: 3 | Status: SHIPPED | OUTPATIENT
Start: 2023-12-18 | End: 2024-05-16

## 2023-12-18 NOTE — PATIENT INSTRUCTIONS
No medication changes  Get cholesterol labs checked  Follow up with cardiology in 1 year  Please call with any questions/concerns [phone]

## 2023-12-18 NOTE — LETTER
12/18/2023    Asad Goodwin  [address] [facility] [address]    RE: Mor Monson       Dear Colleague,     I had the pleasure of seeing Mor Monson in the [facility].  Cardiology Clinic Progress Note  Mor Monson MRN# [id number]   YOB: 1970 Age: 53 year old   Primary Cardiologist: Dr. Méndez  Reason for visit: Cardiology follow up             Assessment and Plan:   Mor Monson is a very pleasant 53 year old male here for cardiology follow up.     1. Coronary artery disease - s/p CABG with left radial artery graft to OM, LIMA to LAD 01/09/2021 with Dr. Rojas  2. Hypertension   3. Hyperlipidemia   4. Left GSV RF ablation and phlebectomy of Left varicose veins with Dr. Eric 8/15/2022     I saw patient today for cardiology follow up. He is doing well from a cardiac standpoint, no anginal symptoms. Blood pressure control improved with increased dose of amlodipine. He underwent an echo in November which showed LVEF 55-60%, RV mildly dilated with mild decrease in RV systolic function, no significant valvular disease, aortic root and ascending aorta mildly dilated. The mild decrease in RV function is new, given no anginal symptoms recommend continued medical management.     He is due for a fasting lipid panel, plans to get drawn in the next couple weeks.     Changes today: none    Follow up plan:     No labs prior to OV, due for lipid panel, orders are in, patient plans to get drawn in the next couple weeks.     Cardiology follow up next year with fasting lipid panel prior        History of Presenting Illness:    Mor Monson is a very pleasant 53 year old male with a history of coronary artery disease s/p CABG (left radial artery graft to OM, LIMA to LAD 01/09/2021 with Dr. Rojas), and mild dilation of aortic root and ascending aorta.    Patient presented to [facility] in January 2021, he was hypertensive 219/121mmHg, troponins normal,  echocardiogram showed mild decrease in LV function and anterior/anterolateral wall hypokinesis, coronary angiogram 1/8/2021 showed severe ulcerative lesion in the distal left main extending into the ostial LAD and left circumflex as well as moderate disease in the RCA. He was transferred to [facility] where he underwent CABG with Dr. Rojas 1/9/2021 with a left radial artery graft to the left circumflex OM and LIMA to LAD grafting.      Patient most recently saw Dr. Méndez November 2022 at which time he was doing well, blood pressures were mildly elevated, his amlodipine was increased to 5mg BID.     Echo 11/9/23 showed LVEF 55-60%, RV mildly dilated with mild decrease in RV systolic function, no significant valvular disease, aortic root and ascending aorta mildly dilated     Patient is here today for cardiology follow up.     Patient reports feeling good. Denies shortness of breath at rest. Denies exertional dyspnea. Denies orthopnea or PND. Denies chest pain or chest tightness. Denies LE edema. Denies dizziness, lightheadedness or other presyncopal symptoms. Denies tachycardia or palpitations. Denies episodes of bleeding. Taking medications daily.     No labs prior to OV, due for lipid panel. Blood pressure 132/82 and HR 64 in clinic today.    Appetite good. Eating most meals at home. No set exercise, enjoys walking. Drinking 2 x per week. Denies tobacco use.       Social History      Social History     Socioeconomic History    Marital status:      Spouse name: Not on file    Number of children: Not on file    Years of education: Not on file    Highest education level: Not on file   Occupational History    Not on file   Tobacco Use    Smoking status: Never    Smokeless tobacco: Never   Substance and Sexual Activity    Alcohol use: Yes     Comment: on weekends    Drug use: Not on file    Sexual activity: Not on file   Other Topics Concern    Parent/sibling w/ CABG, MI or angioplasty before 65F 55M? Not  Asked   Social History Narrative    Not on file     Social Determinants of Health     Financial Resource Strain: Not on file   Food Insecurity: Not on file   Transportation Needs: Not on file   Physical Activity: Not on file   Stress: Not on file   Social Connections: Not on file   Interpersonal Safety: Not on file   Housing Stability: Not on file          Review of Systems:   10 point ROS neg other than the symptoms noted above in the HPI.          Physical Exam:   Vitals: There were no vitals taken for this visit.   Wt Readings from Last 4 Encounters:   11/07/22 115.5 kg (254 lb 9.6 oz)   02/10/22 113.4 kg (250 lb)   02/01/22 121.8 kg (268 lb 8 oz)   09/24/21 116.9 kg (257 lb 12.8 oz)     GEN: well nourished, in no acute distress.  HEENT:  Pupils equal, round. Sclerae nonicteric.   NECK: Supple, no masses appreciated.   C/V:  Regular rate and rhythm, no murmur, rub or gallop.    RESP: Respirations are unlabored. Clear to auscultation bilaterally without wheezing, rales, or rhonchi.  GI: Abdomen soft, nontender.  EXTREM: No LE edema.  NEURO: Alert and oriented, cooperative.  SKIN: Warm and dry.        Data:     LIPID RESULTS:  Lab Results   Component Value Date    CHOL 144 08/12/2022    CHOL 226 (H) 01/08/2021    HDL 51 08/12/2022    HDL 45 01/08/2021    LDL 67 08/12/2022     (H) 01/08/2021    TRIG 130 08/12/2022    TRIG 108 01/08/2021     LIVER ENZYME RESULTS:  Lab Results   Component Value Date    AST 38 01/10/2021    ALT 54 02/01/2022    ALT 25 01/10/2021     CBC RESULTS:  Lab Results   Component Value Date    WBC 9.1 02/18/2021    RBC 5.61 02/18/2021    HGB 16.1 02/18/2021    HCT 50.0 02/18/2021    MCV 89 02/18/2021    MCH 28.7 02/18/2021    MCHC 32.2 02/18/2021    RDW 12.0 02/18/2021     02/18/2021     BMP RESULTS:  Lab Results   Component Value Date     02/04/2022     02/18/2021    POTASSIUM 4.7 02/04/2022    POTASSIUM 4.2 02/18/2021    CHLORIDE 108 02/04/2022    CHLORIDE 108  02/18/2021    CO2 24 02/04/2022    CO2 24 02/18/2021    ANIONGAP 3 02/04/2022    ANIONGAP 8 02/18/2021     (H) 02/04/2022     (H) 02/18/2021    BUN 18 02/04/2022    BUN 14 02/18/2021    CR 0.98 02/04/2022    CR 0.87 02/18/2021    GFRESTIMATED >90 02/04/2022    GFRESTIMATED >90 02/18/2021    GFRESTBLACK >90 02/18/2021    JOSE ALBERTO 9.6 02/04/2022    JOSE ALBERTO 9.9 02/18/2021      A1C RESULTS:  Lab Results   Component Value Date    A1C 5.5 01/07/2021     INR RESULTS:  Lab Results   Component Value Date    INR 1.31 (H) 01/10/2021    INR 1.26 (H) 01/10/2021          Medications     Current Outpatient Medications   Medication Sig Dispense Refill    amLODIPine (NORVASC) 5 MG tablet Take 1 tablet (5 mg) by mouth 2 times daily 180 tablet 3    aspirin (ASA) 325 MG EC tablet Take 1 tablet (325 mg) by mouth daily 90 tablet 3    ciprofloxacin-hydrocortisone (CIPRO HC) 0.2-1 % otic suspension Place 3 drops into both ears 2 times daily as needed (after swimming as needed for ear infections) 10 mL 3    diazepam (VALIUM) 5 MG tablet Please bring both tablets with you to upcoming vein procedure. 2 tablet 0    doxycycline hyclate (VIBRA-TABS) 100 MG tablet Take 100 mg by mouth 2 times daily as needed (eye infection)      HEMP OIL OR EXTRACT OR OTHER CBD CANNABINOID, NOT MEDICAL CANNABIS, At Bedtime      metoprolol succinate ER (TOPROL XL) 50 MG 24 hr tablet Take 1.5 tablets (75 mg) by mouth daily 135 tablet 3    Multiple Vitamins-Minerals (MULTIVITAMIN ADULTS PO) Take by mouth daily      rosuvastatin (CRESTOR) 40 MG tablet Take 1 tablet (40 mg) by mouth daily 90 tablet 3        Past Medical History     Past Medical History:   Diagnosis Date    Hernia, abdominal     Palpitations      Past Surgical History:   Procedure Laterality Date    BYPASS GRAFT ARTERY CORONARY N/A 1/9/2021    Procedure: CORONARY ARTERY BYPASS GRAFT (CABG), On pump, Fermin, Radial vein graft LIMA-LAD Radial artery graft-OM;  Surgeon: Jacky Rojas MD;   Location: [facility] OR    CV CORONARY ANGIOGRAM N/A 1/8/2021    Procedure: Coronary Angiogram;  Surgeon: Gabino Quevedo MD;  Location:  HEART CARDIAC CATH LAB    CV INTRA AORTIC BALLOON N/A 1/9/2021    Procedure: Intra-Aortic Balloon;  Surgeon: Mor Singh MD;  Location:  HEART CARDIAC CATH LAB    CV LEFT HEART CATH N/A 1/8/2021    Procedure: Left Heart Cath;  Surgeon: Gabino Quevedo MD;  Location: [facility] CARDI[facility] C[facility] LAB    HERNIA REPAIR       No family history on file.         Allergies   Patient has no known allergies.    30 minutes spent on the date of the encounter doing chart review, history and exam, documentation and further activities as noted above    SHANNAN De La Garza CNP  [facility]  Pager: [phone]      Thank you for allowing me to participate in the care of your patient.      Sincerely,     SHANNAN De La Garza CNP     [facility]  cc:   No referring provider defined for this encounter.

## 2023-12-18 NOTE — PROGRESS NOTES
Cardiology Clinic Progress Note  Mor Monson MRN# [id number]   YOB: 1970 Age: 53 year old   Primary Cardiologist: Dr. Méndez  Reason for visit: Cardiology follow up             Assessment and Plan:   Mor Monson is a very pleasant 53 year old male here for cardiology follow up.     1. Coronary artery disease - s/p CABG with left radial artery graft to OM, LIMA to LAD 01/09/2021 with Dr. Rojas  2. Hypertension   3. Hyperlipidemia   4. Left GSV RF ablation and phlebectomy of Left varicose veins with Dr. Eric 8/15/2022     I saw patient today for cardiology follow up. He is doing well from a cardiac standpoint, no anginal symptoms. Blood pressure control improved with increased dose of amlodipine. He underwent an echo in November which showed LVEF 55-60%, RV mildly dilated with mild decrease in RV systolic function, no significant valvular disease, aortic root and ascending aorta mildly dilated. The mild decrease in RV function is new, given no anginal symptoms recommend continued medical management.     He is due for a fasting lipid panel, plans to get drawn in the next couple weeks.     Changes today: none    Follow up plan:     No labs prior to OV, due for lipid panel, orders are in, patient plans to get drawn in the next couple weeks.     Cardiology follow up next year with fasting lipid panel prior        History of Presenting Illness:    Mor Monson is a very pleasant 53 year old male with a history of coronary artery disease s/p CABG (left radial artery graft to OM, LIMA to LAD 01/09/2021 with Dr. Rojas), and mild dilation of aortic root and ascending aorta.    Patient presented to [facility] in January 2021, he was hypertensive 219/121mmHg, troponins normal, echocardiogram showed mild decrease in LV function and anterior/anterolateral wall hypokinesis, coronary angiogram 1/8/2021 showed severe ulcerative lesion in the distal left main extending into the ostial LAD and  left circumflex as well as moderate disease in the RCA. He was transferred to [facility] where he underwent CABG with Dr. Rojas 1/9/2021 with a left radial artery graft to the left circumflex OM and LIMA to LAD grafting.      Patient most recently saw Dr. Méndez November 2022 at which time he was doing well, blood pressures were mildly elevated, his amlodipine was increased to 5mg BID.     Echo 11/9/23 showed LVEF 55-60%, RV mildly dilated with mild decrease in RV systolic function, no significant valvular disease, aortic root and ascending aorta mildly dilated     Patient is here today for cardiology follow up.     Patient reports feeling good. Denies shortness of breath at rest. Denies exertional dyspnea. Denies orthopnea or PND. Denies chest pain or chest tightness. Denies LE edema. Denies dizziness, lightheadedness or other presyncopal symptoms. Denies tachycardia or palpitations. Denies episodes of bleeding. Taking medications daily.     No labs prior to OV, due for lipid panel. Blood pressure 132/82 and HR 64 in clinic today.    Appetite good. Eating most meals at home. No set exercise, enjoys walking. Drinking 2 x per week. Denies tobacco use.       Social History      Social History     Socioeconomic History    Marital status:      Spouse name: Not on file    Number of children: Not on file    Years of education: Not on file    Highest education level: Not on file   Occupational History    Not on file   Tobacco Use    Smoking status: Never    Smokeless tobacco: Never   Substance and Sexual Activity    Alcohol use: Yes     Comment: on weekends    Drug use: Not on file    Sexual activity: Not on file   Other Topics Concern    Parent/sibling w/ CABG, MI or angioplasty before 65F 55M? Not Asked   Social History Narrative    Not on file     Social Determinants of Health     Financial Resource Strain: Not on file   Food Insecurity: Not on file   Transportation Needs: Not on file   Physical Activity: Not  on file   Stress: Not on file   Social Connections: Not on file   Interpersonal Safety: Not on file   Housing Stability: Not on file          Review of Systems:   10 point ROS neg other than the symptoms noted above in the HPI.          Physical Exam:   Vitals: There were no vitals taken for this visit.   Wt Readings from Last 4 Encounters:   11/07/22 115.5 kg (254 lb 9.6 oz)   02/10/22 113.4 kg (250 lb)   02/01/22 121.8 kg (268 lb 8 oz)   09/24/21 116.9 kg (257 lb 12.8 oz)     GEN: well nourished, in no acute distress.  HEENT:  Pupils equal, round. Sclerae nonicteric.   NECK: Supple, no masses appreciated.   C/V:  Regular rate and rhythm, no murmur, rub or gallop.    RESP: Respirations are unlabored. Clear to auscultation bilaterally without wheezing, rales, or rhonchi.  GI: Abdomen soft, nontender.  EXTREM: No LE edema.  NEURO: Alert and oriented, cooperative.  SKIN: Warm and dry.        Data:     LIPID RESULTS:  Lab Results   Component Value Date    CHOL 144 08/12/2022    CHOL 226 (H) 01/08/2021    HDL 51 08/12/2022    HDL 45 01/08/2021    LDL 67 08/12/2022     (H) 01/08/2021    TRIG 130 08/12/2022    TRIG 108 01/08/2021     LIVER ENZYME RESULTS:  Lab Results   Component Value Date    AST 38 01/10/2021    ALT 54 02/01/2022    ALT 25 01/10/2021     CBC RESULTS:  Lab Results   Component Value Date    WBC 9.1 02/18/2021    RBC 5.61 02/18/2021    HGB 16.1 02/18/2021    HCT 50.0 02/18/2021    MCV 89 02/18/2021    MCH 28.7 02/18/2021    MCHC 32.2 02/18/2021    RDW 12.0 02/18/2021     02/18/2021     BMP RESULTS:  Lab Results   Component Value Date     02/04/2022     02/18/2021    POTASSIUM 4.7 02/04/2022    POTASSIUM 4.2 02/18/2021    CHLORIDE 108 02/04/2022    CHLORIDE 108 02/18/2021    CO2 24 02/04/2022    CO2 24 02/18/2021    ANIONGAP 3 02/04/2022    ANIONGAP 8 02/18/2021     (H) 02/04/2022     (H) 02/18/2021    BUN 18 02/04/2022    BUN 14 02/18/2021    CR 0.98 02/04/2022    CR  0.87 02/18/2021    GFRESTIMATED >90 02/04/2022    GFRESTIMATED >90 02/18/2021    GFRESTBLACK >90 02/18/2021    JOSE ALBERTO 9.6 02/04/2022    JOSE ALBERTO 9.9 02/18/2021      A1C RESULTS:  Lab Results   Component Value Date    A1C 5.5 01/07/2021     INR RESULTS:  Lab Results   Component Value Date    INR 1.31 (H) 01/10/2021    INR 1.26 (H) 01/10/2021          Medications     Current Outpatient Medications   Medication Sig Dispense Refill    amLODIPine (NORVASC) 5 MG tablet Take 1 tablet (5 mg) by mouth 2 times daily 180 tablet 3    aspirin (ASA) 325 MG EC tablet Take 1 tablet (325 mg) by mouth daily 90 tablet 3    ciprofloxacin-hydrocortisone (CIPRO HC) 0.2-1 % otic suspension Place 3 drops into both ears 2 times daily as needed (after swimming as needed for ear infections) 10 mL 3    diazepam (VALIUM) 5 MG tablet Please bring both tablets with you to upcoming vein procedure. 2 tablet 0    doxycycline hyclate (VIBRA-TABS) 100 MG tablet Take 100 mg by mouth 2 times daily as needed (eye infection)      HEMP OIL OR EXTRACT OR OTHER CBD CANNABINOID, NOT MEDICAL CANNABIS, At Bedtime      metoprolol succinate ER (TOPROL XL) 50 MG 24 hr tablet Take 1.5 tablets (75 mg) by mouth daily 135 tablet 3    Multiple Vitamins-Minerals (MULTIVITAMIN ADULTS PO) Take by mouth daily      rosuvastatin (CRESTOR) 40 MG tablet Take 1 tablet (40 mg) by mouth daily 90 tablet 3        Past Medical History     Past Medical History:   Diagnosis Date    Hernia, abdominal     Palpitations      Past Surgical History:   Procedure Laterality Date    BYPASS GRAFT ARTERY CORONARY N/A 1/9/2021    Procedure: CORONARY ARTERY BYPASS GRAFT (CABG), On pump, Fermin, Radial vein graft LIMA-LAD Radial artery graft-OM;  Surgeon: Jacky Rojas MD;  Location: [facility] OR    CV CORONARY ANGIOGRAM N/A 1/8/2021    Procedure: Coronary Angiogram;  Surgeon: Gabino Quevedo MD;  Location:  HEART CARDIAC CATH LAB    CV INTRA AORTIC BALLOON N/A 1/9/2021    Procedure: Intra-Aortic  Balloon;  Surgeon: Mor Singh MD;  Location:  HEART CARDIAC CATH LAB    CV LEFT HEART CATH N/A 1/8/2021    Procedure: Left Heart Cath;  Surgeon: Gabino Quevedo MD;  Location:  HEART CARDIAC CATH LAB    HERNIA REPAIR       No family history on file.         Allergies   Patient has no known allergies.    30 minutes spent on the date of the encounter doing chart review, history and exam, documentation and further activities as noted above    SHANNAN De La Garza [facility]  Pager: [phone]

## 2023-12-20 DIAGNOSIS — Z95.1 S/P CABG (CORONARY ARTERY BYPASS GRAFT): ICD-10-CM

## 2023-12-20 RX ORDER — ROSUVASTATIN CALCIUM 40 MG/1
40 TABLET, COATED ORAL DAILY
Qty: 90 TABLET | Refills: 0 | Status: SHIPPED | OUTPATIENT
Start: 2023-12-20 | End: 2024-03-28

## 2024-01-13 ENCOUNTER — HEALTH MAINTENANCE LETTER (OUTPATIENT)
Age: 54
End: 2024-01-13

## 2024-02-15 DIAGNOSIS — Z95.1 S/P CABG (CORONARY ARTERY BYPASS GRAFT): ICD-10-CM

## 2024-02-15 RX ORDER — METOPROLOL SUCCINATE 50 MG/1
75 TABLET, EXTENDED RELEASE ORAL DAILY
Qty: 135 TABLET | Refills: 4 | Status: SHIPPED | OUTPATIENT
Start: 2024-02-15 | End: 2024-05-16

## 2024-03-28 DIAGNOSIS — Z95.1 S/P CABG (CORONARY ARTERY BYPASS GRAFT): ICD-10-CM

## 2024-03-28 NOTE — TELEPHONE ENCOUNTER
[employer] Cardiology Refill Guideline reviewed.  Medication does not meet criteria for refill due to overdue for Lipids/ALT.  Messaged to providers team for further review.

## 2024-03-28 NOTE — TELEPHONE ENCOUNTER
Attempted to contact patient to discuss the overdue lipid panel request made last December. Awaiting confirmation that patient has scheduled and completed the lab work before sending a refill.  Left a message with the contact information for scheduling and for Team 2 if any questions.

## 2024-04-03 ENCOUNTER — LAB (OUTPATIENT)
Dept: LAB | Facility: CLINIC | Age: 54
End: 2024-04-03
Payer: COMMERCIAL

## 2024-04-03 DIAGNOSIS — I25.10 CORONARY ARTERY DISEASE: ICD-10-CM

## 2024-04-03 LAB
CHOLEST SERPL-MCNC: 160 MG/DL
FASTING STATUS PATIENT QL REPORTED: YES
HDLC SERPL-MCNC: 44 MG/DL
LDLC SERPL CALC-MCNC: 89 MG/DL
NONHDLC SERPL-MCNC: 116 MG/DL
TRIGL SERPL-MCNC: 137 MG/DL

## 2024-04-03 PROCEDURE — 80061 LIPID PANEL: CPT

## 2024-04-03 PROCEDURE — 36415 COLL VENOUS BLD VENIPUNCTURE: CPT

## 2024-04-04 RX ORDER — ROSUVASTATIN CALCIUM 40 MG/1
40 TABLET, COATED ORAL DAILY
Qty: 90 TABLET | Refills: 3 | Status: SHIPPED | OUTPATIENT
Start: 2024-04-04

## 2024-04-05 ENCOUNTER — TELEPHONE (OUTPATIENT)
Dept: CARDIOLOGY | Facility: CLINIC | Age: 54
End: 2024-04-05
Payer: COMMERCIAL

## 2024-04-05 DIAGNOSIS — I25.110 CORONARY ARTERY DISEASE INVOLVING NATIVE CORONARY ARTERY OF NATIVE HEART WITH UNSTABLE ANGINA PECTORIS (H): ICD-10-CM

## 2024-04-05 DIAGNOSIS — E78.5 HYPERLIPIDEMIA LDL GOAL <70: Primary | ICD-10-CM

## 2024-04-05 RX ORDER — EZETIMIBE 10 MG/1
10 TABLET ORAL DAILY
Qty: 90 TABLET | Refills: 3 | Status: SHIPPED | OUTPATIENT
Start: 2024-04-05

## 2024-04-05 NOTE — RESULT ENCOUNTER NOTE
LDL above goal <70, recommend starting ezetimibe 10mg daily with repeat fasting lipids and ALT in 3 mo thanks

## 2024-04-05 NOTE — TELEPHONE ENCOUNTER
Message from Dr. Méndez re: lipids  Jean Méndez MD  [facility] Heart Team 2  LDL above goal <70, recommend starting ezetimibe 10mg daily with repeat fasting lipids and ALT in 3 mo    1000 called patient to review lipid results and Dr. Méndez's recommendation to start zetia with his crestor. Patient is willing to add the zetia. He states so far he has no side effects with the crestor. Rx escripted.  Order placed for flp/alt in 3 months.  Patient works a changing schedule and some night shifts. He will contact his local [place] [facility] clinic in [place] to do the fasting labs in July.

## 2024-05-13 ENCOUNTER — PATIENT OUTREACH (OUTPATIENT)
Dept: CARDIOLOGY | Facility: CLINIC | Age: 54
End: 2024-05-13
Payer: COMMERCIAL

## 2024-05-13 NOTE — PROGRESS NOTES
[facility]   Received call from wife Lindy with concerns about leg swelling. Lindy is on consent to communicate.     Recent changes or background:   --12/18/23 w/JASON Montes for CAD s/p CABG 1/2021, HTN, HLD and varicose veins s/p left GSV RF ablation and phlebectomy of left varicose veins. No changes made. Pt due for Lipid panel, otherwise follow up in 1 year.   --4/3/24 Lipid panel, per Dr. Méndez, add Zetia 10mg daily, fasting lipids in 3 months    Notes from today:   Spoke with wife Lindy. Lindy notes since medication change last month by Dr. Méndez, pt has had swelling in ankles/legs that has progressively been getting worse. Last night pt reported feet/being painful due to the swelling. Wife notes the swelling is pitting, she pushed thumb in right above ankle and took 20 sec to bounce back. He refuses to wear compression socks due to heat per wife. Wife reports pt's diet is also not been great. 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 Does not think swelling is correlated as it started before that. Denies any other symptoms, but pt is not great at telling you his symptoms per wife. Specifically asked about any SOB or chest pain and wife stated not, although pt very crabby lately, which she attributes to him not feeling well but will not say if he has other symptoms. She does report he seems more tired.     Reviewed medications with wife and she pulled out bottles. Verified our medication list is correct. He does take Amlodipine 10mg BID, but this is not new, did note this can cause swelling. Also noted last Echo done 4/2021- at that time EF 55-50%. Asked wife if pt has ever had any sx of swelling like this before and she stated not, this is a new issue. Does check BP and it has been good in 120/70's per wife.     Given new sx, advise in clinic assessment by provider. JASON Montes no longer with clinic. Dr. Méndez out of office all week. Found opening with Dr. Eric, whom pt has seen  "for varicose veins before, in [place] later this week 5/16 at 4:15pm. Offered to wife, she accepted. Will also send [employer] message with appt info. Advised if sx worse to call back before visit, but if has any associated SOB or chest pain he should go to ER prior to visit.     At end of conversation, wife also noted he is having \"sexual issues\" with his medications but he won't say anything and he will be upset if we ask about it and tell him she brought it up. Wife asks if we can just ask pt if he is having any side effects from medicaitons when he is here, if he says not, let it be, but she doesn't want him upset at her for brining it up. Will make note on chart prep for visit with her request.     Future Appointments   Date Time Provider Department Center   5/16/2024  4:15 PM Richard Eric MD [facility]      Lara Irizarry RN, BSN  05/13/24 at 10:14 AM   "

## 2024-05-16 ENCOUNTER — OFFICE VISIT (OUTPATIENT)
Dept: CARDIOLOGY | Facility: CLINIC | Age: 54
End: 2024-05-16
Payer: COMMERCIAL

## 2024-05-16 VITALS
WEIGHT: 252.2 LBS | DIASTOLIC BLOOD PRESSURE: 74 MMHG | SYSTOLIC BLOOD PRESSURE: 128 MMHG | BODY MASS INDEX: 34.16 KG/M2 | OXYGEN SATURATION: 99 % | HEIGHT: 72 IN | HEART RATE: 63 BPM

## 2024-05-16 DIAGNOSIS — I10 BENIGN ESSENTIAL HYPERTENSION: ICD-10-CM

## 2024-05-16 DIAGNOSIS — R53.83 OTHER FATIGUE: Primary | ICD-10-CM

## 2024-05-16 DIAGNOSIS — Z95.1 S/P CABG (CORONARY ARTERY BYPASS GRAFT): ICD-10-CM

## 2024-05-16 PROCEDURE — 99215 OFFICE O/P EST HI 40 MIN: CPT | Performed by: INTERNAL MEDICINE

## 2024-05-16 PROCEDURE — G2211 COMPLEX E/M VISIT ADD ON: HCPCS | Performed by: INTERNAL MEDICINE

## 2024-05-16 RX ORDER — METOPROLOL SUCCINATE 25 MG/1
25 TABLET, EXTENDED RELEASE ORAL DAILY
Qty: 90 TABLET | Refills: 3 | Status: SHIPPED | OUTPATIENT
Start: 2024-05-16

## 2024-05-16 RX ORDER — AMLODIPINE BESYLATE 5 MG/1
5 TABLET ORAL DAILY
Qty: 90 TABLET | Refills: 3 | Status: SHIPPED | OUTPATIENT
Start: 2024-05-16

## 2024-05-16 NOTE — PROGRESS NOTES
Cardiology Progress Note          Assessment and Plan:       Coronary artery disease status post CABG  Continue risk factor modification with maximum dose rosuvastatin plus ezetimibe.  May be a candidate for PCSK9 intervention in addition given his premature coronary artery disease.      Fatigue on beta-blocker with erectile dysfunction  Decrease Toprol from 75 mg down to 25 mg daily, may reduce even further if needed.      Lower extremity edema  Likely from his supratherapeutic amlodipine erroneously ordered on prescription refill  Reduce down to 5 mg once per day of amlodipine.      Hypertension, adequate control  If blood pressure creeps up with the reduction in amlodipine and Toprol, consider adding hydrochlorothiazide if his stones are calcium oxalate.  He is going to check with his brothers to see if they know what type of stones they have.  Alpha-blocker could also be an option if his nocturia remains after reducing his fluid intake.    40 minutes was spent with the patient, precharting and reviewing tests as well as post visit charting all done today..    This note was transcribed using electronic voice recognition software and there may be typographical errors present.     The longitudinal plan of care for the diagnosis(es)/condition(s) as documented were addressed during this visit. Due to the added complexity in care, I will continue to support Sunil in the subsequent management and with ongoing continuity of care.                    Interval History:     The patient is a very pleasant 53 year old whom I have been following for coronary artery disease status post CABG.  He is on maximum dose rosuvastatin plus ezetimibe.    He has significant constipation on supratherapeutic amlodipine ordered erroneously on last refill by [facility].  He was on 5 mg amlodipine twice per day and was supposed to be refilled at 10 mg once per day but the prescription was sent as 10 mg twice per day which is what he has been  taking.    He has been having increased lower extremity edema with this.    He is also on Toprol 75 mg daily and has fatigue, bradycardia and erectile dysfunction.    He drinks a lot of water in the evening to try to help his erectile dysfunction but this affects him with nocturia.    He also tries to hydrate to avoid kidney stones.  He does not know what type of kidney stone he gets.                     Review of Systems:     Review of Systems:  Skin:        Eyes:       ENT:       Respiratory:  Negative    Cardiovascular:    Positive for;edema  Gastroenterology:      Genitourinary:       Musculoskeletal:       Neurologic:       Psychiatric:       Heme/Lymph/Imm:       Endocrine:                   Physical Exam:     Vitals: /74 (BP Location: Right arm, Patient Position: Sitting, Cuff Size: Adult Large)   Pulse 63   Ht 1.829 m (6')   Wt 114.4 kg (252 lb 3.2 oz)   SpO2 99%   BMI 34.20 kg/m    Constitutional:  cooperative, alert and oriented, well developed, well nourished, in no acute distress        Skin:  warm and dry to the touch, no apparent skin lesions or masses noted        Head:  normocephalic        Eyes:  pupils equal and round, conjunctivae and lids unremarkable, sclera white, no xanthalasma, EOMS intact, no nystagmus        Chest:  clear to auscultation        Cardiac: regular rhythm;normal S1 and S2 bradycardic                Extremities and Back:  no deformities, clubbing, cyanosis, erythema observed;no edema;normal muscle strength and tone        Neurological:  no gross motor deficits;affect appropriate                 Medications:     Current Outpatient Medications   Medication Sig Dispense Refill    amLODIPine (NORVASC) 5 MG tablet Take 1 tablet (5 mg) by mouth daily 90 tablet 3    aspirin (ASA) 325 MG EC tablet Take 1 tablet (325 mg) by mouth daily 90 tablet 3    ciprofloxacin-hydrocortisone (CIPRO HC) 0.2-1 % otic suspension Place 3 drops into both ears 2 times daily as needed (after  "swimming as needed for ear infections) 10 mL 3    doxycycline hyclate (VIBRA-TABS) 100 MG tablet Take 100 mg by mouth 2 times daily as needed (eye infection)      ezetimibe (ZETIA) 10 MG tablet Take 1 tablet (10 mg) by mouth daily 90 tablet 3    HEMP OIL OR EXTRACT OR OTHER CBD CANNABINOID, NOT MEDICAL CANNABIS, at bedtime Topical      metoprolol succinate ER (TOPROL XL) 25 MG 24 hr tablet Take 1 tablet (25 mg) by mouth daily 90 tablet 3    Multiple Vitamins-Minerals (MULTIVITAMIN ADULTS PO) Take by mouth every morning 2 tabs      rosuvastatin (CRESTOR) 40 MG tablet Take 1 tablet (40 mg) by mouth daily 90 tablet 3                Data:   All laboratory data reviewed  No results found for this or any previous visit (from the past 24 hour(s)).    All laboratory data reviewed  Lab Results   Component Value Date    CHOL 160 04/03/2024    CHOL 226 01/08/2021     Lab Results   Component Value Date    HDL 44 04/03/2024    HDL 45 01/08/2021     Lab Results   Component Value Date    LDL 89 04/03/2024     01/08/2021     Lab Results   Component Value Date    TRIG 137 04/03/2024    TRIG 108 01/08/2021     No results found for: \"CHOLHDLRATIO\"  No results found for: \"TSH\"  Last Basic Metabolic Panel:  Lab Results   Component Value Date     02/04/2022     02/18/2021      Lab Results   Component Value Date    POTASSIUM 4.7 02/04/2022    POTASSIUM 4.2 02/18/2021     Lab Results   Component Value Date    CHLORIDE 108 02/04/2022    CHLORIDE 108 02/18/2021     Lab Results   Component Value Date    JOSE ALBERTO 9.6 02/04/2022    JOSE ALBERTO 9.9 02/18/2021     Lab Results   Component Value Date    CO2 24 02/04/2022    CO2 24 02/18/2021     Lab Results   Component Value Date    BUN 18 02/04/2022    BUN 14 02/18/2021     Lab Results   Component Value Date    CR 0.98 02/04/2022    CR 0.87 02/18/2021     Lab Results   Component Value Date     02/04/2022     02/18/2021     Lab Results   Component Value Date    WBC 9.1 " 02/18/2021     Lab Results   Component Value Date    RBC 5.61 02/18/2021     Lab Results   Component Value Date    HGB 16.1 02/18/2021     Lab Results   Component Value Date    HCT 50.0 02/18/2021     Lab Results   Component Value Date    MCV 89 02/18/2021     Lab Results   Component Value Date    MCH 28.7 02/18/2021     Lab Results   Component Value Date    MCHC 32.2 02/18/2021     Lab Results   Component Value Date    RDW 12.0 02/18/2021     Lab Results   Component Value Date     02/18/2021

## 2024-05-16 NOTE — LETTER
5/16/2024    Asad Goodwin  [id number] [address] [facility] [address]    RE: Mor Monson       Dear Colleague,     I had the pleasure of seeing Mor Monson in the [facility].  Cardiology Progress Note          Assessment and Plan:       Coronary artery disease status post CABG  Continue risk factor modification with maximum dose rosuvastatin plus ezetimibe.  May be a candidate for PCSK9 intervention in addition given his premature coronary artery disease.      Fatigue on beta-blocker with erectile dysfunction  Decrease Toprol from 75 mg down to 25 mg daily, may reduce even further if needed.      Lower extremity edema  Likely from his supratherapeutic amlodipine erroneously ordered on prescription refill  Reduce down to 5 mg once per day of amlodipine.      Hypertension, adequate control  If blood pressure creeps up with the reduction in amlodipine and Toprol, consider adding hydrochlorothiazide if his stones are calcium oxalate.  He is going to check with his brothers to see if they know what type of stones they have.  Alpha-blocker could also be an option if his nocturia remains after reducing his fluid intake.    40 minutes was spent with the patient, precharting and reviewing tests as well as post visit charting all done today..    This note was transcribed using electronic voice recognition software and there may be typographical errors present.     The longitudinal plan of care for the diagnosis(es)/condition(s) as documented were addressed during this visit. Due to the added complexity in care, I will continue to support Sunil in the subsequent management and with ongoing continuity of care.                    Interval History:     The patient is a very pleasant 53 year old whom I have been following for coronary artery disease status post CABG.  He is on maximum dose rosuvastatin plus ezetimibe.    He has significant constipation on supratherapeutic amlodipine ordered erroneously on  last refill by [facility].  He was on 5 mg amlodipine twice per day and was supposed to be refilled at 10 mg once per day but the prescription was sent as 10 mg twice per day which is what he has been taking.    He has been having increased lower extremity edema with this.    He is also on Toprol 75 mg daily and has fatigue, bradycardia and erectile dysfunction.    He drinks a lot of water in the evening to try to help his erectile dysfunction but this affects him with nocturia.    He also tries to hydrate to avoid kidney stones.  He does not know what type of kidney stone he gets.                     Review of Systems:     Review of Systems:  Skin:        Eyes:       ENT:       Respiratory:  Negative    Cardiovascular:    Positive for;edema  Gastroenterology:      Genitourinary:       Musculoskeletal:       Neurologic:       Psychiatric:       Heme/Lymph/Imm:       Endocrine:                   Physical Exam:     Vitals: /74 (BP Location: Right arm, Patient Position: Sitting, Cuff Size: Adult Large)   Pulse 63   Ht 1.829 m (6')   Wt 114.4 kg (252 lb 3.2 oz)   SpO2 99%   BMI 34.20 kg/m    Constitutional:  cooperative, alert and oriented, well developed, well nourished, in no acute distress        Skin:  warm and dry to the touch, no apparent skin lesions or masses noted        Head:  normocephalic        Eyes:  pupils equal and round, conjunctivae and lids unremarkable, sclera white, no xanthalasma, EOMS intact, no nystagmus        Chest:  clear to auscultation        Cardiac: regular rhythm;normal S1 and S2 bradycardic                Extremities and Back:  no deformities, clubbing, cyanosis, erythema observed;no edema;normal muscle strength and tone        Neurological:  no gross motor deficits;affect appropriate                 Medications:     Current Outpatient Medications   Medication Sig Dispense Refill    amLODIPine (NORVASC) 5 MG tablet Take 1 tablet (5 mg) by mouth daily 90 tablet 3    aspirin (ASA) 325  "MG EC tablet Take 1 tablet (325 mg) by mouth daily 90 tablet 3    ciprofloxacin-hydrocortisone (CIPRO HC) 0.2-1 % otic suspension Place 3 drops into both ears 2 times daily as needed (after swimming as needed for ear infections) 10 mL 3    doxycycline hyclate (VIBRA-TABS) 100 MG tablet Take 100 mg by mouth 2 times daily as needed (eye infection)      ezetimibe (ZETIA) 10 MG tablet Take 1 tablet (10 mg) by mouth daily 90 tablet 3    HEMP OIL OR EXTRACT OR OTHER CBD CANNABINOID, NOT MEDICAL CANNABIS, at bedtime Topical      metoprolol succinate ER (TOPROL XL) 25 MG 24 hr tablet Take 1 tablet (25 mg) by mouth daily 90 tablet 3    Multiple Vitamins-Minerals (MULTIVITAMIN ADULTS PO) Take by mouth every morning 2 tabs      rosuvastatin (CRESTOR) 40 MG tablet Take 1 tablet (40 mg) by mouth daily 90 tablet 3                Data:   All laboratory data reviewed  No results found for this or any previous visit (from the past 24 hour(s)).    All laboratory data reviewed  Lab Results   Component Value Date    CHOL 160 04/03/2024    CHOL 226 01/08/2021     Lab Results   Component Value Date    HDL 44 04/03/2024    HDL 45 01/08/2021     Lab Results   Component Value Date    LDL 89 04/03/2024     01/08/2021     Lab Results   Component Value Date    TRIG 137 04/03/2024    TRIG 108 01/08/2021     No results found for: \"CHOLHDLRATIO\"  No results found for: \"TSH\"  Last Basic Metabolic Panel:  Lab Results   Component Value Date     02/04/2022     02/18/2021      Lab Results   Component Value Date    POTASSIUM 4.7 02/04/2022    POTASSIUM 4.2 02/18/2021     Lab Results   Component Value Date    CHLORIDE 108 02/04/2022    CHLORIDE 108 02/18/2021     Lab Results   Component Value Date    JOSE ALBERTO 9.6 02/04/2022    JOSE ALBERTO 9.9 02/18/2021     Lab Results   Component Value Date    CO2 24 02/04/2022    CO2 24 02/18/2021     Lab Results   Component Value Date    BUN 18 02/04/2022    BUN 14 02/18/2021     Lab Results   Component Value " Date    CR 0.98 02/04/2022    CR 0.87 02/18/2021     Lab Results   Component Value Date     02/04/2022     02/18/2021     Lab Results   Component Value Date    WBC 9.1 02/18/2021     Lab Results   Component Value Date    RBC 5.61 02/18/2021     Lab Results   Component Value Date    HGB 16.1 02/18/2021     Lab Results   Component Value Date    HCT 50.0 02/18/2021     Lab Results   Component Value Date    MCV 89 02/18/2021     Lab Results   Component Value Date    MCH 28.7 02/18/2021     Lab Results   Component Value Date    MCHC 32.2 02/18/2021     Lab Results   Component Value Date    RDW 12.0 02/18/2021     Lab Results   Component Value Date     02/18/2021             Thank you for allowing me to participate in the care of your patient.      Sincerely,     Richard Eric MD     [facility]  cc:   No referring provider defined for this encounter.

## 2024-05-16 NOTE — PATIENT INSTRUCTIONS
We will make the medication adjustments and you drink less water and get better sleep.    Check your blood pressures and if they're above 140 consistently then let me know and we might add a diuretic that can prevent calcium kidney stones.    Message me in a couple weeks and let me know how the medication changes are going and if we should fine tune any more.

## 2024-09-10 ENCOUNTER — OFFICE VISIT (OUTPATIENT)
Dept: AUDIOLOGY | Facility: CLINIC | Age: 54
End: 2024-09-10
Attending: FAMILY MEDICINE

## 2024-09-10 PROCEDURE — V5264 EAR MOLD/INSERT: HCPCS | Mod: NU,RT,LT | Performed by: AUDIOLOGIST

## 2024-09-10 NOTE — PROGRESS NOTES
New set of swim plugs ordered 8.19.24, here 9.9.24, send bill per patient INV#[id number]  Dispensed to daughter in clinic    Company: [employer]    Style: 24B Floating earmolds w/cord   Material:  Neon-Lites  Color: Orange with green cash icon on right, blue guitar icon on left, orange cord   Glitter: N/A   Vent:  N/A  Canal: Medium  Helix:  No  Ear(s):  Bilateral

## 2024-10-21 ENCOUNTER — DOCUMENTATION ONLY (OUTPATIENT)
Dept: AUDIOLOGY | Facility: CLINIC | Age: 54
End: 2024-10-21
Payer: COMMERCIAL

## 2024-10-21 NOTE — PROGRESS NOTES
The impression used for initial order in August were not for him, way too small. Contacted [employer] 9.11.2024 to have them confirm invoice number of impressions, wrong patient. Remake was completed and received 10.3.2024, dispensed to daughter. New invoice #[id number].

## 2024-11-11 ENCOUNTER — LAB (OUTPATIENT)
Dept: LAB | Facility: CLINIC | Age: 54
End: 2024-11-11
Payer: COMMERCIAL

## 2024-11-11 DIAGNOSIS — I10 BENIGN ESSENTIAL HYPERTENSION: ICD-10-CM

## 2024-11-11 DIAGNOSIS — I25.110 CORONARY ARTERY DISEASE INVOLVING NATIVE CORONARY ARTERY OF NATIVE HEART WITH UNSTABLE ANGINA PECTORIS (H): ICD-10-CM

## 2024-11-11 DIAGNOSIS — Z12.5 SCREENING FOR PROSTATE CANCER: ICD-10-CM

## 2024-11-11 DIAGNOSIS — E78.5 HYPERLIPIDEMIA LDL GOAL <70: ICD-10-CM

## 2024-11-11 PROCEDURE — 80048 BASIC METABOLIC PNL TOTAL CA: CPT

## 2024-11-11 PROCEDURE — G0103 PSA SCREENING: HCPCS

## 2024-11-11 PROCEDURE — 80061 LIPID PANEL: CPT

## 2024-11-11 PROCEDURE — 36415 COLL VENOUS BLD VENIPUNCTURE: CPT

## 2024-11-11 PROCEDURE — 84460 ALANINE AMINO (ALT) (SGPT): CPT

## 2024-11-12 LAB
ALT SERPL W P-5'-P-CCNC: 56 U/L (ref 0–70)
CHOLEST SERPL-MCNC: 145 MG/DL
FASTING STATUS PATIENT QL REPORTED: YES
HDLC SERPL-MCNC: 48 MG/DL
LDLC SERPL CALC-MCNC: 70 MG/DL
NONHDLC SERPL-MCNC: 97 MG/DL
TRIGL SERPL-MCNC: 135 MG/DL

## 2024-11-14 ENCOUNTER — OFFICE VISIT (OUTPATIENT)
Dept: CARDIOLOGY | Facility: CLINIC | Age: 54
End: 2024-11-14
Payer: COMMERCIAL

## 2024-11-14 VITALS
DIASTOLIC BLOOD PRESSURE: 88 MMHG | SYSTOLIC BLOOD PRESSURE: 138 MMHG | HEART RATE: 77 BPM | HEIGHT: 72 IN | WEIGHT: 260 LBS | BODY MASS INDEX: 35.21 KG/M2

## 2024-11-14 DIAGNOSIS — Z95.1 S/P CABG (CORONARY ARTERY BYPASS GRAFT): ICD-10-CM

## 2024-11-14 DIAGNOSIS — Z12.5 SCREENING FOR PROSTATE CANCER: Primary | ICD-10-CM

## 2024-11-14 DIAGNOSIS — I10 BENIGN ESSENTIAL HYPERTENSION: ICD-10-CM

## 2024-11-14 DIAGNOSIS — R53.83 OTHER FATIGUE: ICD-10-CM

## 2024-11-14 DIAGNOSIS — I77.810 MILD ASCENDING AORTA DILATION (H): ICD-10-CM

## 2024-11-14 LAB
ANION GAP SERPL CALCULATED.3IONS-SCNC: 18 MMOL/L (ref 7–15)
BUN SERPL-MCNC: 10.3 MG/DL (ref 6–20)
CALCIUM SERPL-MCNC: 10.8 MG/DL (ref 8.8–10.4)
CHLORIDE SERPL-SCNC: 104 MMOL/L (ref 98–107)
CREAT SERPL-MCNC: 0.77 MG/DL (ref 0.67–1.17)
EGFRCR SERPLBLD CKD-EPI 2021: >90 ML/MIN/1.73M2
FASTING STATUS PATIENT QL REPORTED: YES
GLUCOSE SERPL-MCNC: 87 MG/DL (ref 70–99)
HCO3 SERPL-SCNC: 17 MMOL/L (ref 22–29)
POTASSIUM SERPL-SCNC: 4.3 MMOL/L (ref 3.4–5.3)
PSA SERPL DL<=0.01 NG/ML-MCNC: 1.45 NG/ML (ref 0–3.5)
SODIUM SERPL-SCNC: 139 MMOL/L (ref 135–145)

## 2024-11-14 PROCEDURE — 99214 OFFICE O/P EST MOD 30 MIN: CPT | Performed by: INTERNAL MEDICINE

## 2024-11-14 RX ORDER — METOPROLOL SUCCINATE 25 MG/1
25 TABLET, EXTENDED RELEASE ORAL DAILY
Qty: 90 TABLET | Refills: 3 | Status: SHIPPED | OUTPATIENT
Start: 2024-11-14

## 2024-11-14 RX ORDER — ROSUVASTATIN CALCIUM 40 MG/1
40 TABLET, COATED ORAL DAILY
Qty: 90 TABLET | Refills: 3 | Status: SHIPPED | OUTPATIENT
Start: 2024-11-14

## 2024-11-14 RX ORDER — AMLODIPINE BESYLATE 5 MG/1
5 TABLET ORAL DAILY
Qty: 90 TABLET | Refills: 3 | Status: SHIPPED | OUTPATIENT
Start: 2024-11-14

## 2024-11-14 RX ORDER — HYDROCHLOROTHIAZIDE 12.5 MG/1
12.5 TABLET ORAL DAILY
Qty: 90 TABLET | Refills: 3 | Status: SHIPPED | OUTPATIENT
Start: 2024-11-14

## 2024-11-14 NOTE — PATIENT INSTRUCTIONS
12.5 mg daily    November 14, 2024    Thank you for allowing our Cardiology team to participate in your care.     Please note the following changes to your heart treatment plan:     Medication changes:   - start hydrochlorothiazide 12.5mg daily    Tests to be done:  - labs in 2 weeks  - labs in 1 year  - TTE in 1 year    Follow up:  - Follow up in 1 year, or sooner as needed.      For scheduling, please call [phone].      Please contact our team through [employer] or our Nurse Team Voicemail service [phone], or the General Clinic [phone] for any questions or concerns.     If you are having a medical emergency, please call [phone].     Sincerely,    Jean Méndez MD, [facility]  Cardiology    [facility] - [facility] [place]

## 2024-11-14 NOTE — LETTER
11/14/2024    Asad Goodwin  [address] [facility] [address]    RE: Mor Monson       Dear Colleague,     I had the pleasure of seeing Mor Monson in the [facility].      Cardiology Clinic Progress Note:    November 14, 2024   Patient Name: Mor Monson  Patient MRN: [id number]     Consult indication: CAD    HPI:    I had the opportunity to see patient Mor Monson in cardiology clinic for a follow up visit. Patient is followed by our colleague Asad Goodwin with Primary Care.     As you know, Mr. Monson is a pleasant 53-year-old male with a past medical history significant for coronary artery disease, status post CABG (left radial artery graft to OM, LIMA to LAD 01/09/2021 with Dr. Rojas), mildly dilated aortic root and ascending aorta, HTN, who presents for followup.     I had initially met Mr. Monson at [facility] when he presented for chest discomfort.  He works as a  and so is very physically active on a regular basis.  In the preceding months, he had noted left-sided chest discomfort as well as dyspnea on exertion.  In the emergency department, he was noted to be significantly hypertensive at 219/121 mmHg.  ECG demonstrated borderline LVH changes, but otherwise did not demonstrate any abnormalities, including acute ischemic abnormalities.  Troponins were normal.  TTE was done 01/08/2021 that was personally reviewed and demonstrated a mildly reduced LV function with anterior and anterolateral wall hypokinesis.  The patient underwent cardiac catheterization 01/08/2021 with Dr. Singh that demonstrated a severe ulcerative lesion in the distal left main extending into the ostial LAD and left circumflex as well as moderate disease in the RCA.  Ultimately, the patient was transferred to [facility] where he underwent coronary artery bypass surgery with Dr. Rojas 01/09/2021 with a left radial artery graft to the  left circumflex OM and LIMA to LAD grafting.     Overall patient reports that he has been doing well.  He denies any chest pain, chest pressure with exertion.  No abnormal dyspnea.  No symptoms of orthopnea/PND.  BP remains mildly elevated.  He does have a history of kidney stones, when he was seen by my partner Dr. Eric, it was suggested to consider low-dose hydrochlorothiazide.  BP today in clinic 138/88 mmHg.  Reviewed recent lipids, LDL 70.    Assessment and Plan/Recommendations:    # Coronary artery disease, presented with unstable angina 01/07/2021, status post cardiac catheterization 01/08/2021 demonstrating multivessel obstructive coronary artery disease, status post coronary artery bypass surgery with Dr. Rojas 01/09/2021.  Left radial artery graft to left circumflex and OM, LIMA to LAD.  Residual nonobstructive disease in the RCA.  Denies any symptoms concerning for angina or decompensated heart failure.   # Postoperative SVT versus atrial fibrillation, postop day 2, per chart review resolved with metoprolol, no evidence of recurrence since then.   # Hypertension.   BP mildly elevated.   #  Hyperlipidemia.  LDL at goal <70.  #  Left GSV RF ablation and phlebectomy of Left varicose veins with Dr. Eric 8/15/2022    -Overall patient is stable from a cardiac perspective without symptoms of angina or decompensated heart failure  - BP remains mildly elevated, will start low-dose hydrochlorothiazide  - Advised to monitor for signs/symptoms suggesting dehydration, since he does work a physically demanding job, often on ladders, low threshold to change this if needed  -Continue remainder of cardiac regimen of aspirin, amlodipine, ezetimibe, metoprolol succinate, rosuvastatin  - BMP in 2 weeks  - Follow-up in 1 year with labs and TTE, or sooner as needed    Thank you for allowing our team to participate in the care of Mor Monson.  Please do not hesitate to call or page me with any questions or  concerns.    Sincerely,     Jean Méndez MD, [facility]  Cardiology  Text Page   November 14, 2024    cc  Richard rEic MD  [address]  [place] [address]    Voice recognition software utilized.       Past Medical History:     Past Medical History:   Diagnosis Date     Hernia, abdominal      Palpitations         Past Surgical History:   Past Surgical History:   Procedure Laterality Date     BYPASS GRAFT ARTERY CORONARY N/A 1/9/2021    Procedure: CORONARY ARTERY BYPASS GRAFT (CABG), On pump, Fermin, Radial vein graft LIMA-LAD Radial artery graft-OM;  Surgeon: Jacky Rojas MD;  Location: [facility] OR     CV CORONARY ANGIOGRAM N/A 1/8/2021    Procedure: Coronary Angiogram;  Surgeon: Gabino Quevedo MD;  Location:  HEART CARDIAC CATH LAB     CV INTRA AORTIC BALLOON N/A 1/9/2021    Procedure: Intra-Aortic Balloon;  Surgeon: Mor Singh MD;  Location:  HEART CARDIAC CATH LAB     CV LEFT HEART CATH N/A 1/8/2021    Procedure: Left Heart Cath;  Surgeon: Gabino Quevedo MD;  Location:  HEART CARDIAC CATH LAB     HERNIA REPAIR       IR LUMBAR PUNCTURE  8/30/2023     IR LUMBAR PUNCTURE  10/16/2024       Medications (outpatient):  Current Outpatient Medications   Medication Sig Dispense Refill     amLODIPine (NORVASC) 5 MG tablet Take 1 tablet (5 mg) by mouth daily. 90 tablet 3     aspirin (ASA) 325 MG EC tablet Take 1 tablet (325 mg) by mouth daily 90 tablet 3     ciprofloxacin-hydrocortisone (CIPRO HC) 0.2-1 % otic suspension Place 3 drops into both ears 2 times daily as needed (after swimming as needed for ear infections) 10 mL 3     doxycycline hyclate (VIBRA-TABS) 100 MG tablet Take 100 mg by mouth 2 times daily as needed (eye infection)       ezetimibe (ZETIA) 10 MG tablet Take 1 tablet (10 mg) by mouth daily 90 tablet 3     HEMP OIL OR EXTRACT OR OTHER CBD CANNABINOID, NOT MEDICAL CANNABIS, at bedtime Topical       hydroCHLOROthiazide 12.5 MG tablet Take 1 tablet  (12.5 mg) by mouth daily. 90 tablet 3     metoprolol succinate ER (TOPROL XL) 25 MG 24 hr tablet Take 1 tablet (25 mg) by mouth daily. 90 tablet 3     Multiple Vitamins-Minerals (MULTIVITAMIN ADULTS PO) Take by mouth every morning 2 tabs       rosuvastatin (CRESTOR) 40 MG tablet Take 1 tablet (40 mg) by mouth daily. 90 tablet 3       Allergies:  No Known Allergies    Social History:   History   Drug Use Unknown      History   Smoking Status     Never   Smokeless Tobacco     Never     Social History    Substance and Sexual Activity      Alcohol use: Yes        Comment: 2 days a week- 5 drinks per day- hard alcohol       Family History:  Family History   Problem Relation Age of Onset     Hypertension Mother      Hypertension Father      Myocardial Infarction Maternal Grandfather      Heart Surgery Paternal Grandfather        Review of Systems:   A complete review of systems was negative except as mentioned in the History of Present Illness.     Objective & Physical Exam:  /88   Pulse 77   Ht 1.829 m (6')   Wt 117.9 kg (260 lb)   BMI 35.26 kg/m    Wt Readings from Last 2 Encounters:   11/14/24 117.9 kg (260 lb)   05/16/24 114.4 kg (252 lb 3.2 oz)     Body mass index is 35.26 kg/m .   Body surface area is 2.45 meters squared.    Constitutional: appears stated age, in no apparent distress, appears to be well nourished  Pulmonary: clear to auscultation bilaterally, no wheezes, no rales, no increased work of breathing  Cardiovascular: JVP normal, regular rate, regular rhythm, no murmur appreciated      Data reviewed:  Lab Results   Component Value Date    WBC 9.1 02/18/2021    RBC 5.61 02/18/2021    HGB 16.1 02/18/2021    HCT 50.0 02/18/2021    MCV 89 02/18/2021    MCH 28.7 02/18/2021    MCHC 32.2 02/18/2021    RDW 12.0 02/18/2021     02/18/2021     Sodium   Date Value Ref Range Status   02/04/2022 135 133 - 144 mmol/L Final   02/18/2021 140 133 - 144 mmol/L Final     Potassium   Date Value Ref Range  Status   02/04/2022 4.7 3.4 - 5.3 mmol/L Final     Comment:     Specimen slightly hemolyzed, potassium may be falsely elevated.   02/18/2021 4.2 3.4 - 5.3 mmol/L Final     Chloride   Date Value Ref Range Status   02/04/2022 108 94 - 109 mmol/L Final   02/18/2021 108 94 - 109 mmol/L Final     Carbon Dioxide   Date Value Ref Range Status   02/18/2021 24 20 - 32 mmol/L Final     Carbon Dioxide (CO2)   Date Value Ref Range Status   02/04/2022 24 20 - 32 mmol/L Final     Anion Gap   Date Value Ref Range Status   02/04/2022 3 3 - 14 mmol/L Final   02/18/2021 8 3 - 14 mmol/L Final     Glucose   Date Value Ref Range Status   02/04/2022 166 (H) 70 - 99 mg/dL Final   02/18/2021 137 (H) 70 - 99 mg/dL Final     Urea Nitrogen   Date Value Ref Range Status   02/04/2022 18 7 - 30 mg/dL Final   02/18/2021 14 7 - 30 mg/dL Final     Creatinine   Date Value Ref Range Status   02/04/2022 0.98 0.66 - 1.25 mg/dL Final   02/18/2021 0.87 0.66 - 1.25 mg/dL Final     GFR Estimate   Date Value Ref Range Status   02/04/2022 >90 >60 mL/min/1.73m2 Final     Comment:     Effective December 21, 2021 eGFRcr in adults is calculated using the 2021 CKD-EPI creatinine equation which includes age and gender (Patricia azar al., NEJ, DOI: 10.1056/NJUKbc2631021)   02/18/2021 >90 >60 mL/min/[1.73_m2] Final     Comment:     Non  GFR Calc  Starting 12/18/2018, serum creatinine based estimated GFR (eGFR) will be   calculated using the Chronic Kidney Disease Epidemiology Collaboration   (CKD-EPI) equation.       Calcium   Date Value Ref Range Status   02/04/2022 9.6 8.5 - 10.1 mg/dL Final   02/18/2021 9.9 8.5 - 10.1 mg/dL Final     Bilirubin Total   Date Value Ref Range Status   01/10/2021 0.9 0.2 - 1.3 mg/dL Final     Alkaline Phosphatase   Date Value Ref Range Status   01/10/2021 42 40 - 150 U/L Final     ALT   Date Value Ref Range Status   11/11/2024 56 0 - 70 U/L Final   01/10/2021 25 0 - 70 U/L Final     AST   Date Value Ref Range Status    01/10/2021 38 0 - 45 U/L Final     Recent Labs   Lab Test 11/11/24  1252 04/03/24  1130   CHOL 145 160   HDL 48 44   LDL 70 89   TRIG 135 137      Lab Results   Component Value Date    A1C 5.5 01/07/2021        Thank you for allowing me to participate in the care of your patient.      Sincerely,     Jean Méndez MD     [facility]  cc:   Richard Eric MD  [address]  [place] [address]

## 2024-11-14 NOTE — PROGRESS NOTES
Cardiology Clinic Progress Note:    November 14, 2024   Patient Name: Mor Monson  Patient MRN: [id number]     Consult indication: CAD    HPI:    I had the opportunity to see patient Mor Monson in cardiology clinic for a follow up visit. Patient is followed by our colleague Asad Goodwin with Primary Care.     As you know, Mr. Monson is a pleasant 53-year-old male with a past medical history significant for coronary artery disease, status post CABG (left radial artery graft to OM, LIMA to LAD 01/09/2021 with Dr. Rojas), mildly dilated aortic root and ascending aorta, HTN, who presents for followup.     I had initially met Mr. Monson at [facility] when he presented for chest discomfort.  He works as a  and so is very physically active on a regular basis.  In the preceding months, he had noted left-sided chest discomfort as well as dyspnea on exertion.  In the emergency department, he was noted to be significantly hypertensive at 219/121 mmHg.  ECG demonstrated borderline LVH changes, but otherwise did not demonstrate any abnormalities, including acute ischemic abnormalities.  Troponins were normal.  TTE was done 01/08/2021 that was personally reviewed and demonstrated a mildly reduced LV function with anterior and anterolateral wall hypokinesis.  The patient underwent cardiac catheterization 01/08/2021 with Dr. Singh that demonstrated a severe ulcerative lesion in the distal left main extending into the ostial LAD and left circumflex as well as moderate disease in the RCA.  Ultimately, the patient was transferred to [facility] where he underwent coronary artery bypass surgery with Dr. Rojas 01/09/2021 with a left radial artery graft to the left circumflex OM and LIMA to LAD grafting.     Overall patient reports that he has been doing well.  He denies any chest pain, chest pressure with exertion.  No abnormal dyspnea.  No symptoms of  orthopnea/PND.  BP remains mildly elevated.  He does have a history of kidney stones, when he was seen by my partner Dr. Eric, it was suggested to consider low-dose hydrochlorothiazide.  BP today in clinic 138/88 mmHg.  Reviewed recent lipids, LDL 70.    Assessment and Plan/Recommendations:    # Coronary artery disease, presented with unstable angina 01/07/2021, status post cardiac catheterization 01/08/2021 demonstrating multivessel obstructive coronary artery disease, status post coronary artery bypass surgery with Dr. Rojas 01/09/2021.  Left radial artery graft to left circumflex and OM, LIMA to LAD.  Residual nonobstructive disease in the RCA.  Denies any symptoms concerning for angina or decompensated heart failure.   # Postoperative SVT versus atrial fibrillation, postop day 2, per chart review resolved with metoprolol, no evidence of recurrence since then.   # Hypertension.   BP mildly elevated.   #  Hyperlipidemia.  LDL at goal <70.  #  Left GSV RF ablation and phlebectomy of Left varicose veins with Dr. Eric 8/15/2022    -Overall patient is stable from a cardiac perspective without symptoms of angina or decompensated heart failure  - BP remains mildly elevated, will start low-dose hydrochlorothiazide  - Advised to monitor for signs/symptoms suggesting dehydration, since he does work a physically demanding job, often on ladders, low threshold to change this if needed  -Continue remainder of cardiac regimen of aspirin, amlodipine, ezetimibe, metoprolol succinate, rosuvastatin  - BMP in 2 weeks  - Follow-up in 1 year with labs and TTE, or sooner as needed    Thank you for allowing our team to participate in the care of Mor Monson.  Please do not hesitate to call or page me with any questions or concerns.    Sincerely,     Jean Méndez MD, [facility]  Cardiology  Text Page   November 14, 2024    cc  Richard Eric MD  [address]  [place] [address]    Voice recognition  software utilized.       Past Medical History:     Past Medical History:   Diagnosis Date    Hernia, abdominal     Palpitations         Past Surgical History:   Past Surgical History:   Procedure Laterality Date    BYPASS GRAFT ARTERY CORONARY N/A 1/9/2021    Procedure: CORONARY ARTERY BYPASS GRAFT (CABG), On pump, Fermin, Radial vein graft LIMA-LAD Radial artery graft-OM;  Surgeon: Jacky Rojas MD;  Location: [facility] OR    CV CORONARY ANGIOGRAM N/A 1/8/2021    Procedure: Coronary Angiogram;  Surgeon: Gabino Quevedo MD;  Location:  HEART CARDIAC CATH LAB    CV INTRA AORTIC BALLOON N/A 1/9/2021    Procedure: Intra-Aortic Balloon;  Surgeon: Mor Singh MD;  Location:  HEART CARDIAC CATH LAB    CV LEFT HEART CATH N/A 1/8/2021    Procedure: Left Heart Cath;  Surgeon: Gabino Quevedo MD;  Location:  HEART CARDIAC CATH LAB    HERNIA REPAIR      IR LUMBAR PUNCTURE  8/30/2023    IR LUMBAR PUNCTURE  10/16/2024       Medications (outpatient):  Current Outpatient Medications   Medication Sig Dispense Refill    amLODIPine (NORVASC) 5 MG tablet Take 1 tablet (5 mg) by mouth daily. 90 tablet 3    aspirin (ASA) 325 MG EC tablet Take 1 tablet (325 mg) by mouth daily 90 tablet 3    ciprofloxacin-hydrocortisone (CIPRO HC) 0.2-1 % otic suspension Place 3 drops into both ears 2 times daily as needed (after swimming as needed for ear infections) 10 mL 3    doxycycline hyclate (VIBRA-TABS) 100 MG tablet Take 100 mg by mouth 2 times daily as needed (eye infection)      ezetimibe (ZETIA) 10 MG tablet Take 1 tablet (10 mg) by mouth daily 90 tablet 3    HEMP OIL OR EXTRACT OR OTHER CBD CANNABINOID, NOT MEDICAL CANNABIS, at bedtime Topical      hydroCHLOROthiazide 12.5 MG tablet Take 1 tablet (12.5 mg) by mouth daily. 90 tablet 3    metoprolol succinate ER (TOPROL XL) 25 MG 24 hr tablet Take 1 tablet (25 mg) by mouth daily. 90 tablet 3    Multiple Vitamins-Minerals (MULTIVITAMIN ADULTS PO) Take by mouth every  morning 2 tabs      rosuvastatin (CRESTOR) 40 MG tablet Take 1 tablet (40 mg) by mouth daily. 90 tablet 3       Allergies:  No Known Allergies    Social History:   History   Drug Use Unknown      History   Smoking Status    Never   Smokeless Tobacco    Never     Social History    Substance and Sexual Activity      Alcohol use: Yes        Comment: 2 days a week- 5 drinks per day- hard alcohol       Family History:  Family History   Problem Relation Age of Onset    Hypertension Mother     Hypertension Father     Myocardial Infarction Maternal Grandfather     Heart Surgery Paternal Grandfather        Review of Systems:   A complete review of systems was negative except as mentioned in the History of Present Illness.     Objective & Physical Exam:  /88   Pulse 77   Ht 1.829 m (6')   Wt 117.9 kg (260 lb)   BMI 35.26 kg/m    Wt Readings from Last 2 Encounters:   11/14/24 117.9 kg (260 lb)   05/16/24 114.4 kg (252 lb 3.2 oz)     Body mass index is 35.26 kg/m .   Body surface area is 2.45 meters squared.    Constitutional: appears stated age, in no apparent distress, appears to be well nourished  Pulmonary: clear to auscultation bilaterally, no wheezes, no rales, no increased work of breathing  Cardiovascular: JVP normal, regular rate, regular rhythm, no murmur appreciated      Data reviewed:  Lab Results   Component Value Date    WBC 9.1 02/18/2021    RBC 5.61 02/18/2021    HGB 16.1 02/18/2021    HCT 50.0 02/18/2021    MCV 89 02/18/2021    MCH 28.7 02/18/2021    MCHC 32.2 02/18/2021    RDW 12.0 02/18/2021     02/18/2021     Sodium   Date Value Ref Range Status   02/04/2022 135 133 - 144 mmol/L Final   02/18/2021 140 133 - 144 mmol/L Final     Potassium   Date Value Ref Range Status   02/04/2022 4.7 3.4 - 5.3 mmol/L Final     Comment:     Specimen slightly hemolyzed, potassium may be falsely elevated.   02/18/2021 4.2 3.4 - 5.3 mmol/L Final     Chloride   Date Value Ref Range Status   02/04/2022 108 94 -  109 mmol/L Final   02/18/2021 108 94 - 109 mmol/L Final     Carbon Dioxide   Date Value Ref Range Status   02/18/2021 24 20 - 32 mmol/L Final     Carbon Dioxide (CO2)   Date Value Ref Range Status   02/04/2022 24 20 - 32 mmol/L Final     Anion Gap   Date Value Ref Range Status   02/04/2022 3 3 - 14 mmol/L Final   02/18/2021 8 3 - 14 mmol/L Final     Glucose   Date Value Ref Range Status   02/04/2022 166 (H) 70 - 99 mg/dL Final   02/18/2021 137 (H) 70 - 99 mg/dL Final     Urea Nitrogen   Date Value Ref Range Status   02/04/2022 18 7 - 30 mg/dL Final   02/18/2021 14 7 - 30 mg/dL Final     Creatinine   Date Value Ref Range Status   02/04/2022 0.98 0.66 - 1.25 mg/dL Final   02/18/2021 0.87 0.66 - 1.25 mg/dL Final     GFR Estimate   Date Value Ref Range Status   02/04/2022 >90 >60 mL/min/1.73m2 Final     Comment:     Effective December 21, 2021 eGFRcr in adults is calculated using the 2021 CKD-EPI creatinine equation which includes age and gender (Patricia et al., NEJM, DOI: 10.1056/CSPOmy8055594)   02/18/2021 >90 >60 mL/min/[1.73_m2] Final     Comment:     Non  GFR Calc  Starting 12/18/2018, serum creatinine based estimated GFR (eGFR) will be   calculated using the Chronic Kidney Disease Epidemiology Collaboration   (CKD-EPI) equation.       Calcium   Date Value Ref Range Status   02/04/2022 9.6 8.5 - 10.1 mg/dL Final   02/18/2021 9.9 8.5 - 10.1 mg/dL Final     Bilirubin Total   Date Value Ref Range Status   01/10/2021 0.9 0.2 - 1.3 mg/dL Final     Alkaline Phosphatase   Date Value Ref Range Status   01/10/2021 42 40 - 150 U/L Final     ALT   Date Value Ref Range Status   11/11/2024 56 0 - 70 U/L Final   01/10/2021 25 0 - 70 U/L Final     AST   Date Value Ref Range Status   01/10/2021 38 0 - 45 U/L Final     Recent Labs   Lab Test 11/11/24  1252 04/03/24  1130   CHOL 145 160   HDL 48 44   LDL 70 89   TRIG 135 137      Lab Results   Component Value Date    A1C 5.5 01/07/2021

## 2025-01-25 ENCOUNTER — HEALTH MAINTENANCE LETTER (OUTPATIENT)
Age: 55
End: 2025-01-25

## 2025-03-27 DIAGNOSIS — I25.110 CORONARY ARTERY DISEASE INVOLVING NATIVE CORONARY ARTERY OF NATIVE HEART WITH UNSTABLE ANGINA PECTORIS (H): ICD-10-CM

## 2025-03-27 DIAGNOSIS — E78.5 HYPERLIPIDEMIA LDL GOAL <70: ICD-10-CM

## 2025-03-27 RX ORDER — EZETIMIBE 10 MG/1
10 TABLET ORAL DAILY
Qty: 90 TABLET | Refills: 3 | Status: SHIPPED | OUTPATIENT
Start: 2025-03-27

## 2025-09-03 ENCOUNTER — TELEPHONE (OUTPATIENT)
Dept: CARDIOLOGY | Facility: CLINIC | Age: 55
End: 2025-09-03
Payer: COMMERCIAL

## (undated) DEVICE — GLOVE PROTEXIS POWDER FREE 7.0 ORTHOPEDIC 2D73ET70

## (undated) DEVICE — DEVICE ASSEMBLY SUCTION/ANTI COAG BTC93

## (undated) DEVICE — CLIP HORIZON MULTI SM YELLOW 001204

## (undated) DEVICE — COVER TABLE POLY 65X90" 8186

## (undated) DEVICE — LINEN LEG ROLL 5489

## (undated) DEVICE — SU STEEL 6 CCS 4X18" M654G

## (undated) DEVICE — PACK OPEN HEART PV12OH524

## (undated) DEVICE — SU ETHIBOND 3-0 BBDA 36" X588H

## (undated) DEVICE — KIT HAND CONTROL ANGIOTOUCH ACIST 65CM AT-P65

## (undated) DEVICE — KIT ENDO VASOVIEW HEMOPRO 2 VH-4000

## (undated) DEVICE — LINEN TOWEL PACK X5 5464

## (undated) DEVICE — CANNULA PERFUSION ARTERIAL 22FR 12" 77422

## (undated) DEVICE — ESU ELEC BLADE 2.75" COATED/INSULATED E1455

## (undated) DEVICE — SU ETHIBOND 0 CT-1 CR 8X18" CX21D

## (undated) DEVICE — PACK MINI VAC CUSTOM CARDOPULMONARY BB5Z97R15

## (undated) DEVICE — CANNULA PERFUSION AORTIC ROOT 22FR 20012

## (undated) DEVICE — SU PROLENE 7-0 BV-1DA 4X24" M8702

## (undated) DEVICE — GLOVE PROTEXIS POWDER FREE 6.5 ORTHOPEDIC 2D73ET65

## (undated) DEVICE — DRAPE SLUSH/WARMER 66X44" ORS-320

## (undated) DEVICE — CANNULA PERFUSION VENOUS 2 STAGE 34-46FR

## (undated) DEVICE — SOL WATER IRRIG 1000ML BOTTLE 2F7114

## (undated) DEVICE — SU SILK 1 TIE 10X30" SA87G

## (undated) DEVICE — SU ETHIBOND 2-0 SHDA 30" X563H

## (undated) DEVICE — LINEN TOWEL PACK X30 5481

## (undated) DEVICE — GOWN LG DISP 9515

## (undated) DEVICE — DEFIB PRO-PADZ LVP LQD GEL ADULT 8900-2105-01

## (undated) DEVICE — MANIFOLD KIT ANGIO AUTOMATED 014613

## (undated) DEVICE — INTRO SHEATH 4FRX10CM PINNACLE RSS402

## (undated) DEVICE — RESERVOIR CELL SAVING BLOOD COLLECTION EL2120

## (undated) DEVICE — CONNECTOR PERFUSION STR 1/2X1/2" W/O LL 6025

## (undated) DEVICE — SUCTION CANISTER MEDIVAC LINER 3000ML W/LID 65651-530

## (undated) DEVICE — CATH BALLOON IABP 7.5FRX50ML INTRA-AORTIC 0684-00-0576-01U

## (undated) DEVICE — SU VICRYL 3-0 SH 27" J316H

## (undated) DEVICE — CATH DIAG 4FR JL 4.5 538417

## (undated) DEVICE — MEDITRACE MULTIFUNTION ADULT RADIOTRANSPARENT ELECTRODE FOR ZOLL

## (undated) DEVICE — DRAPE MAYO STAND 23X54 8337

## (undated) DEVICE — SU VICRYL 0 CTX 36" J370H

## (undated) DEVICE — TUBING SUCTION 12"X1/4" N612

## (undated) DEVICE — SU PROLENE 4-0 RB-1DA 36" 8557H

## (undated) DEVICE — TOTE ANGIO CORP PC15AT SAN32CC83O

## (undated) DEVICE — CATH ANGIO INFINITI 3DRC 4FRX100CM 538476

## (undated) DEVICE — CONNECTOR DRAIN CHEST Y EXTENSION SET 19909

## (undated) DEVICE — SOL NACL 0.9% IRRIG 1000ML BOTTLE 2F7124

## (undated) DEVICE — SUCTION WAND 6FR 16.5CM

## (undated) DEVICE — PROTECTOR ARM ONE-STEP TRENDELENBURG 40418

## (undated) DEVICE — CONNECTOR CARDIO BLAKE DRAIN BCC2

## (undated) DEVICE — SU VICRYL 3-0 FS-1 27" J442H

## (undated) DEVICE — SU PROLENE 6-0 C-1DA 30" M8706

## (undated) DEVICE — VESSEL LOOPS RED MAXI

## (undated) DEVICE — Device

## (undated) DEVICE — LINEN TOWEL PACK X6 WHITE 5487

## (undated) DEVICE — LEAD PACER MYOCARDIAL BIPOLAR TEMPORARY 53CM 6495F

## (undated) DEVICE — DRSG KERLIX 4 1/2"X4YDS ROLL 6715

## (undated) DEVICE — KIT WASH CELL SAVING ATL2001

## (undated) DEVICE — SU PROLENE 8-0 BV130-5 24" M8732

## (undated) DEVICE — PUNCH AORTIC 4.0MM

## (undated) DEVICE — SU PROLENE 7-0 BV175-6 24' M8737

## (undated) DEVICE — SUCTION CATH 18FR ORAL TRI-FLO T62

## (undated) DEVICE — ADPT PERFUSION MULTIPLE

## (undated) DEVICE — CABLE MYO/LEAD PACING BLUE DISP 019-535

## (undated) DEVICE — GUIDEWIRE VASC 0.035INX150CM INQWIRE J TIP IQ35F150J3F/A

## (undated) DEVICE — GOWN XLG DISP 9545

## (undated) DEVICE — SPONGE PEANUT

## (undated) DEVICE — DRAIN CHEST TUBE 28FR STR 8028

## (undated) RX ORDER — ALBUTEROL SULFATE 90 UG/1
AEROSOL, METERED RESPIRATORY (INHALATION)
Status: DISPENSED
Start: 2021-01-09

## (undated) RX ORDER — LIDOCAINE HYDROCHLORIDE 20 MG/ML
INJECTION, SOLUTION EPIDURAL; INFILTRATION; INTRACAUDAL; PERINEURAL
Status: DISPENSED
Start: 2021-01-09

## (undated) RX ORDER — VECURONIUM BROMIDE 1 MG/ML
INJECTION, POWDER, LYOPHILIZED, FOR SOLUTION INTRAVENOUS
Status: DISPENSED
Start: 2021-01-09

## (undated) RX ORDER — GLYCOPYRROLATE 0.2 MG/ML
INJECTION, SOLUTION INTRAMUSCULAR; INTRAVENOUS
Status: DISPENSED
Start: 2021-01-09

## (undated) RX ORDER — FENTANYL CITRATE 50 UG/ML
INJECTION, SOLUTION INTRAMUSCULAR; INTRAVENOUS
Status: DISPENSED
Start: 2021-01-09

## (undated) RX ORDER — PROTAMINE SULFATE 10 MG/ML
INJECTION, SOLUTION INTRAVENOUS
Status: DISPENSED
Start: 2021-01-09

## (undated) RX ORDER — CEFAZOLIN SODIUM 1 G/3ML
INJECTION, POWDER, FOR SOLUTION INTRAMUSCULAR; INTRAVENOUS
Status: DISPENSED
Start: 2021-01-09

## (undated) RX ORDER — HEPARIN SODIUM 1000 [USP'U]/ML
INJECTION, SOLUTION INTRAVENOUS; SUBCUTANEOUS
Status: DISPENSED
Start: 2021-01-09

## (undated) RX ORDER — PAPAVERINE HYDROCHLORIDE 30 MG/ML
INJECTION INTRAMUSCULAR; INTRAVENOUS
Status: DISPENSED
Start: 2021-01-09

## (undated) RX ORDER — PROPOFOL 10 MG/ML
INJECTION, EMULSION INTRAVENOUS
Status: DISPENSED
Start: 2021-01-09

## (undated) RX ORDER — LIDOCAINE HYDROCHLORIDE 10 MG/ML
INJECTION, SOLUTION EPIDURAL; INFILTRATION; INTRACAUDAL; PERINEURAL
Status: DISPENSED
Start: 2021-01-09

## (undated) RX ORDER — LIDOCAINE HYDROCHLORIDE 10 MG/ML
INJECTION, SOLUTION INFILTRATION; PERINEURAL
Status: DISPENSED
Start: 2021-01-09

## (undated) RX ORDER — FENTANYL CITRATE 0.05 MG/ML
INJECTION, SOLUTION INTRAMUSCULAR; INTRAVENOUS
Status: DISPENSED
Start: 2021-01-09

## (undated) RX ORDER — NEOSTIGMINE METHYLSULFATE 1 MG/ML
VIAL (ML) INJECTION
Status: DISPENSED
Start: 2021-01-09

## (undated) RX ORDER — ETOMIDATE 2 MG/ML
INJECTION INTRAVENOUS
Status: DISPENSED
Start: 2021-01-09